# Patient Record
Sex: MALE | Race: ASIAN | NOT HISPANIC OR LATINO | URBAN - METROPOLITAN AREA
[De-identification: names, ages, dates, MRNs, and addresses within clinical notes are randomized per-mention and may not be internally consistent; named-entity substitution may affect disease eponyms.]

---

## 2017-01-06 ENCOUNTER — OFFICE VISIT (OUTPATIENT)
Dept: FAMILY MEDICINE | Facility: CLINIC | Age: 47
End: 2017-01-06
Payer: COMMERCIAL

## 2017-01-06 VITALS
OXYGEN SATURATION: 96 % | SYSTOLIC BLOOD PRESSURE: 114 MMHG | TEMPERATURE: 97 F | DIASTOLIC BLOOD PRESSURE: 63 MMHG | HEIGHT: 64 IN | BODY MASS INDEX: 23.05 KG/M2 | WEIGHT: 135 LBS | HEART RATE: 87 BPM

## 2017-01-06 DIAGNOSIS — R73.03 PREDIABETES: ICD-10-CM

## 2017-01-06 DIAGNOSIS — E78.5 HYPERLIPIDEMIA LDL GOAL <130: ICD-10-CM

## 2017-01-06 DIAGNOSIS — K21.9 GASTROESOPHAGEAL REFLUX DISEASE, ESOPHAGITIS PRESENCE NOT SPECIFIED: Primary | ICD-10-CM

## 2017-01-06 DIAGNOSIS — Z87.891 FORMER SMOKER: ICD-10-CM

## 2017-01-06 LAB
ERYTHROCYTE [DISTWIDTH] IN BLOOD BY AUTOMATED COUNT: 12.9 % (ref 10–15)
HCT VFR BLD AUTO: 43.3 % (ref 40–53)
HGB BLD-MCNC: 14.2 G/DL (ref 13.3–17.7)
MCH RBC QN AUTO: 30.4 PG (ref 26.5–33)
MCHC RBC AUTO-ENTMCNC: 32.8 G/DL (ref 31.5–36.5)
MCV RBC AUTO: 93 FL (ref 78–100)
PLATELET # BLD AUTO: 241 10E9/L (ref 150–450)
RBC # BLD AUTO: 4.67 10E12/L (ref 4.4–5.9)
WBC # BLD AUTO: 8.9 10E9/L (ref 4–11)

## 2017-01-06 PROCEDURE — 99214 OFFICE O/P EST MOD 30 MIN: CPT | Performed by: NURSE PRACTITIONER

## 2017-01-06 PROCEDURE — 85027 COMPLETE CBC AUTOMATED: CPT | Performed by: NURSE PRACTITIONER

## 2017-01-06 PROCEDURE — 36415 COLL VENOUS BLD VENIPUNCTURE: CPT | Performed by: NURSE PRACTITIONER

## 2017-01-06 RX ORDER — ATORVASTATIN CALCIUM 20 MG/1
20 TABLET, FILM COATED ORAL
Qty: 90 TABLET | Refills: 3 | Status: SHIPPED | OUTPATIENT
Start: 2017-01-06 | End: 2017-07-19

## 2017-01-06 NOTE — NURSING NOTE
"Chief Complaint   Patient presents with     Gastrophageal Reflux     Lab Result Notice       Initial /63 mmHg  Pulse 87  Temp(Src) 97  F (36.1  C) (Oral)  Ht 5' 4\" (1.626 m)  Wt 135 lb (61.236 kg)  BMI 23.16 kg/m2  SpO2 96% Estimated body mass index is 23.16 kg/(m^2) as calculated from the following:    Height as of this encounter: 5' 4\" (1.626 m).    Weight as of this encounter: 135 lb (61.236 kg).  BP completed using cuff size: echo Vasquez MA      "

## 2017-01-06 NOTE — PATIENT INSTRUCTIONS
Based on your medical history and these are the current health maintenance or preventive care services that you are due for (some may have been done at this visit)  There are no preventive care reminders to display for this patient.    At [facility], we strive to deliver an exceptional experience to you, every time we see you.    If you receive a survey in the mail, please send us back your thoughts. We really do value your feedback.    Your care team's suggested websites for health information:  Www.[place].org : Up to date and easily searchable information on multiple topics.  Www.medlineplus.gov : medication info, interactive tutorials, watch real surgeries online  Www.familydoctor.org : good info from the Academy of Family Physicians  Www.cdc.gov : public health info, travel advisories, epidemics (H1N1)  Www.aap.org : children's health info, normal development, vaccinations  Www.health.[facility].[place].us : [place] dept of health, public health issues in [place], N1N1    How to contact your care team:   [facility] [phone]         Pharmacy [phone]    Dr. Mccauley, Carly Guzmán PA-C, Dr. Chakraborty, Ramonita MUÑOZ CNP, Karla Lindsay PA-C, Dr. Singh, and Stephanie Juarez CNP    Team RNs: Kylah Mustafa      Clinic hours  M-Th 7 am-7 pm   Fri 7 am-5 pm.   Urgent care M-F 11 am-9 pm,   Sat/Sun 9 am-5 pm.  Pharmacy M-Th 8 am-8 pm Fri 8 am-6 pm  Sat/Sun 9 am-5 pm.     All password changes, disabled accounts, or ID changes in [email or web address] will be done by our Access Services Department.    If you need help with your account or password, call: [phone]. Clinic staff no longer has the ability to change passwords.       GERD (Adult)    The esophagus is a tube that carries food from the mouth to the stomach. A valve at the lower end of the esophagus prevents stomach acid from flowing upward. When this valve doesn't work properly, stomach contents may repeatedly flow back up  "(reflux) into the esophagus. This is called gastroesophageal reflux disease (GERD). GERD can irritate the esophagus. It can cause problems with swallowing or breathing. In severe cases, GERD can cause recurrent pneumonia or other serious problems.  Symptoms of reflux include burning, pressure or sharp pain in the upper abdomen or mid to lower chest. The pain can spread to the neck, back, or shoulder. There may be belching, an acid taste in the back of the throat, chronic cough, or sore throat or hoarseness. GERD symptoms often occur during the day after a big meal. They can also occur at night when lying down.   Home care  Lifestyle changes can help reduce symptoms. If needed, medicines may be prescribed. Symptoms often improve with treatment, but if treatment is stopped, the symptoms often return after a few months. So most persons with GERD will need to continue treatment.  Lifestyle changes    Limit or avoid fatty, fried, and spicy foods, as well as coffee, chocolate, mint, and foods with high acid content such as tomatoes and citrus fruit and juices (orange, grapefruit, lemon).    Don t eat large meals, especially at night. Frequent, smaller meals are best. Do not lie down right after eating. And don t eat anything 3 hours before going to bed.    Avoid drinking alcohol and smoking. As much as possible, stay away from second hand smoke.    If you are overweight, losing weight will reduce symptoms.     Avoid wearing tight clothing around your stomach area.    If your symptoms occur during sleep, use a foam wedge to elevate your upper body (not just your head.) Or, place 4\" blocks under the head of your bed.  Medicines  If needed, medicines can help relieve the symptoms of GERD and prevent damage to the esophagus. Discuss a medicine plan with your healthcare provider. This may include one or more of the following medicines:    Antacids to help neutralize the normal acids in your stomach.    Acid blockers (H2 " blockers) to decrease acid production.    Acid inhibitors (PPIs) to decrease acid production in a different way than the blockers. They may work better, but can take a little longer to take effect.  Take an antacid 30-60 minutes after eating and at bedtime, but not at the same time as an acid blocker.  Try not to take medicines such as ibuprofen and aspirin. If you are taking aspirin for your heart or other medical reasons, talk to your healthcare provider about stopping it.  Follow-up care  Follow up with your healthcare provider or as advised by our staff.  When to seek medical advice  Call your healthcare provider if any of the following occur:    Stomach pain gets worse or moves to the lower right abdomen (appendix area)    Chest pain appears or gets worse, or spreads to the back, neck, shoulder, or arm    Frequent vomiting (can t keep down liquids)    Blood in the stool or vomit (red or black in color)    Feeling weak or dizzy    Fever of 100.4 F (38 C) or higher, or as directed by your healthcare provider    9675-9324 [employer]. [address] [place] [address]. All rights reserved. This information is not intended as a substitute for professional medical care. Always follow your healthcare professional's instructions.        Healthy Meals for Diabetes  Ask your healthcare team to help you make a meal plan that fits your needs. Your meal plan tells you when to eat your meals and snacks, what kinds of foods to eat, and how much of each food to eat. You don t have to give up all the foods you like. But you do need to follow some guidelines.  Choose healthy carbohydrates    Starches, sugars, and fiber are all types of carbohydrates. Fiber can help lower your cholesterol and triglycerides. Fiber is also healthy for your heart. You should have 20 to 35 grams of total fiber each day. Fiber-rich foods include:    Whole-grain breads and cereals    Bulgur wheat    Brown rice       Whole-wheat  pasta    Fruits and vegetables    Dry beans, and peas   It s important to keep track of the amount of carbohydrates you eat. This can help you keep the right balance of physical activity and medicine. The amount of carbohydrates needed will vary for each person. It depends on many things such as your health, the medicines you take, and how active you are. Your healthcare team will help you figure out the right amount of carbohydrates for you. You may start with around 45 to 60 grams of carbohydrates per meal, depending on your situation.   Here are some examples of foods containing about 15 grams of carbohydrates (1 serving of carbohydrates):    1/2 cup of canned or frozen fruit    A small piece of fresh fruit (4 ounces)    1 slice of bread    1/2 cup of oatmeal    1/3 cup of rice    4 to 6 crackers    1/2 English muffin    1/2 cup of black beans    1/4 of a large baked potato (3 ounces)    2/3 cup of plain fat-free yogurt    1 cup of soup    1/2 cup of casserole    6 chicken nuggets    2-inch square brownie or cake without frosting    2 small cookies    1/2 cup of ice cream or sherbet  Choose healthy protein foods  Eating protein that is low in fat can help you control your weight. It also helps keep your heart healthy. Low-fat protein foods include:    Fish    Plant proteins, such as dry beans and peas, nuts, and soy products like tofu and soymilk    Lean meat with all visible fat removed    Poultry with the skin removed    Low-fat or nonfat milk, cheese, and yogurt  Limit unhealthy fats and sugar  Saturated and trans fats are unhealthy for your heart. They raise LDL (bad) cholesterol. Fat is also high in calories, so it can make you gain weight. To cut down on unhealthy fats and sugar, limit these foods:    Butter or margarine    Palm and palm kernel oils and coconut oil    Cream    Cheese    Duff    Lunch meats       Ice cream    Sweet bakery goods such as pies, muffins, and donuts    Jams and jellies    Candy  bars    Regular sodas   How much to eat  The amount of food you eat affects your blood sugar. It also affects your weight. Your health care team will tell you how much of each type of food you should eat.    Use measuring cups and spoons and a food scale to measure serving sizes.    Learn what a correct serving size looks like on your plate. This will help when you are away from home and can t measure your servings.    Eat only the number of servings given on your meal plan for each food. Don t take seconds.    Learn to read food labels. Be sure to look at serving size, total carbohydrates, fiber, calories, sugar, and saturated and trans fats.  When to eat  Your meal plan will likely include breakfast, lunch, dinner, and some snacks.    Try to eat your meals and snacks at about the same times each day.    Eat all your meals and snacks. Skipping a meal or snack can make your blood sugar drop too low. It can also cause you to eat too much at the next meal or snack. Then your blood sugar could get too high.    8278-0928 [employer]. [address] [place] [address], [place] [address]. All rights reserved. This information is not intended as a substitute for professional medical care. Always follow your healthcare professional's instructions.

## 2017-01-06 NOTE — MR AVS SNAPSHOT
After Visit Summary   1/6/2017    Martin Salvador    MRN: [id number]           Patient Information     Date Of Birth          1970        Visit Information        Provider Department      1/6/2017 4:00 PM Maria Fernanda Juarez APRN CNP [facility]        Today's Diagnoses     Gastroesophageal reflux disease, esophagitis presence not specified    -  1     Prediabetes         Former smoker         Hyperlipidemia LDL goal <130           Care Instructions    Based on your medical history and these are the current health maintenance or preventive care services that you are due for (some may have been done at this visit)  There are no preventive care reminders to display for this patient.    At [facility], we strive to deliver an exceptional experience to you, every time we see you.    If you receive a survey in the mail, please send us back your thoughts. We really do value your feedback.    Your care team's suggested websites for health information:  Www.[employer] : Up to date and easily searchable information on multiple topics.  Www.medlineplus.gov : medication info, interactive tutorials, watch real surgeries online  Www.familydoctor.org : good info from the Academy of Family Physicians  Www.cdc.gov : public health info, travel advisories, epidemics (H1N1)  Www.aap.org : children's health info, normal development, vaccinations  Www.health.[facility].[place].us : [place] dept of health, public health issues in [place], N1N1    How to contact your care team:   Team [facility] [phone]         Pharmacy [phone]    Dr. Mccauley, Carly Guzmán PA-C, Ramonita Vale CNP, Karla Lindsay PA-C, Dr. Singh, and Stephanie Juarez CNP    Team RNs: Kylah & Moon      Clinic hours  M-Th 7 am-7 pm   Fri 7 am-5 pm.   Urgent care M-F 11 am-9 pm,   Sat/Sun 9 am-5 pm.  Pharmacy M-Th 8 am-8 pm Fri 8 am-6 pm  Sat/Sun 9 am-5 pm.     All password changes, disabled  accounts, or ID changes in [facility]/MyHealth will be done by our Access Services Department.    If you need help with your account or password, call: [phone]. Clinic staff no longer has the ability to change passwords.       GERD (Adult)    The esophagus is a tube that carries food from the mouth to the stomach. A valve at the lower end of the esophagus prevents stomach acid from flowing upward. When this valve doesn't work properly, stomach contents may repeatedly flow back up (reflux) into the esophagus. This is called gastroesophageal reflux disease (GERD). GERD can irritate the esophagus. It can cause problems with swallowing or breathing. In severe cases, GERD can cause recurrent pneumonia or other serious problems.  Symptoms of reflux include burning, pressure or sharp pain in the upper abdomen or mid to lower chest. The pain can spread to the neck, back, or shoulder. There may be belching, an acid taste in the back of the throat, chronic cough, or sore throat or hoarseness. GERD symptoms often occur during the day after a big meal. They can also occur at night when lying down.   Home care  Lifestyle changes can help reduce symptoms. If needed, medicines may be prescribed. Symptoms often improve with treatment, but if treatment is stopped, the symptoms often return after a few months. So most persons with GERD will need to continue treatment.  Lifestyle changes    Limit or avoid fatty, fried, and spicy foods, as well as coffee, chocolate, mint, and foods with high acid content such as tomatoes and citrus fruit and juices (orange, grapefruit, lemon).    Don t eat large meals, especially at night. Frequent, smaller meals are best. Do not lie down right after eating. And don t eat anything 3 hours before going to bed.    Avoid drinking alcohol and smoking. As much as possible, stay away from second hand smoke.    If you are overweight, losing weight will reduce symptoms.     Avoid wearing tight clothing  "around your stomach area.    If your symptoms occur during sleep, use a foam wedge to elevate your upper body (not just your head.) Or, place 4\" blocks under the head of your bed.  Medicines  If needed, medicines can help relieve the symptoms of GERD and prevent damage to the esophagus. Discuss a medicine plan with your healthcare provider. This may include one or more of the following medicines:    Antacids to help neutralize the normal acids in your stomach.    Acid blockers (H2 blockers) to decrease acid production.    Acid inhibitors (PPIs) to decrease acid production in a different way than the blockers. They may work better, but can take a little longer to take effect.  Take an antacid 30-60 minutes after eating and at bedtime, but not at the same time as an acid blocker.  Try not to take medicines such as ibuprofen and aspirin. If you are taking aspirin for your heart or other medical reasons, talk to your healthcare provider about stopping it.  Follow-up care  Follow up with your healthcare provider or as advised by our staff.  When to seek medical advice  Call your healthcare provider if any of the following occur:    Stomach pain gets worse or moves to the lower right abdomen (appendix area)    Chest pain appears or gets worse, or spreads to the back, neck, shoulder, or arm    Frequent vomiting (can t keep down liquids)    Blood in the stool or vomit (red or black in color)    Feeling weak or dizzy    Fever of 100.4 F (38 C) or higher, or as directed by your healthcare provider    1586-7564 [employer]. [address] [place] [address], [place] [address]. All rights reserved. This information is not intended as a substitute for professional medical care. Always follow your healthcare professional's instructions.        Healthy Meals for Diabetes  Ask your healthcare team to help you make a meal plan that fits your needs. Your meal plan tells you when to eat your meals and snacks, what kinds of foods to " eat, and how much of each food to eat. You don t have to give up all the foods you like. But you do need to follow some guidelines.  Choose healthy carbohydrates    Starches, sugars, and fiber are all types of carbohydrates. Fiber can help lower your cholesterol and triglycerides. Fiber is also healthy for your heart. You should have 20 to 35 grams of total fiber each day. Fiber-rich foods include:    Whole-grain breads and cereals    Bulgur wheat    Brown rice       Whole-wheat pasta    Fruits and vegetables    Dry beans, and peas   It s important to keep track of the amount of carbohydrates you eat. This can help you keep the right balance of physical activity and medicine. The amount of carbohydrates needed will vary for each person. It depends on many things such as your health, the medicines you take, and how active you are. Your healthcare team will help you figure out the right amount of carbohydrates for you. You may start with around 45 to 60 grams of carbohydrates per meal, depending on your situation.   Here are some examples of foods containing about 15 grams of carbohydrates (1 serving of carbohydrates):    1/2 cup of canned or frozen fruit    A small piece of fresh fruit (4 ounces)    1 slice of bread    1/2 cup of oatmeal    1/3 cup of rice    4 to 6 crackers    1/2 English muffin    1/2 cup of black beans    1/4 of a large baked potato (3 ounces)    2/3 cup of plain fat-free yogurt    1 cup of soup    1/2 cup of casserole    6 chicken nuggets    2-inch square brownie or cake without frosting    2 small cookies    1/2 cup of ice cream or sherbet  Choose healthy protein foods  Eating protein that is low in fat can help you control your weight. It also helps keep your heart healthy. Low-fat protein foods include:    Fish    Plant proteins, such as dry beans and peas, nuts, and soy products like tofu and soymilk    Lean meat with all visible fat removed    Poultry with the skin removed    Low-fat  or nonfat milk, cheese, and yogurt  Limit unhealthy fats and sugar  Saturated and trans fats are unhealthy for your heart. They raise LDL (bad) cholesterol. Fat is also high in calories, so it can make you gain weight. To cut down on unhealthy fats and sugar, limit these foods:    Butter or margarine    Palm and palm kernel oils and coconut oil    Cream    Cheese    Duff    Lunch meats       Ice cream    Sweet bakery goods such as pies, muffins, and donuts    Jams and jellies    Candy bars    Regular sodas   How much to eat  The amount of food you eat affects your blood sugar. It also affects your weight. Your health care team will tell you how much of each type of food you should eat.    Use measuring cups and spoons and a food scale to measure serving sizes.    Learn what a correct serving size looks like on your plate. This will help when you are away from home and can t measure your servings.    Eat only the number of servings given on your meal plan for each food. Don t take seconds.    Learn to read food labels. Be sure to look at serving size, total carbohydrates, fiber, calories, sugar, and saturated and trans fats.  When to eat  Your meal plan will likely include breakfast, lunch, dinner, and some snacks.    Try to eat your meals and snacks at about the same times each day.    Eat all your meals and snacks. Skipping a meal or snack can make your blood sugar drop too low. It can also cause you to eat too much at the next meal or snack. Then your blood sugar could get too high.    2229-6679 [employer]. [address] [place] [address], [place] [address]. All rights reserved. This information is not intended as a substitute for professional medical care. Always follow your healthcare professional's instructions.              Follow-ups after your visit        Additional Services     NUTRITION REFERRAL       Your provider has referred you to: [facility]: [facility] ([phone])  "[phone]   [email or web address]    Please be aware that coverage of these services is subject to the terms and limitations of your health insurance plan.  Call member services at your health plan with any benefit or coverage questions.      Please bring the following with you to your appointment:    (1) This referral request  (2) Any documents given to you regarding this referral  (3) Any specific questions you have about diet and/or food choices                  Future tests that were ordered for you today     Open Future Orders        Priority Expected Expires Ordered    H Pylori antigen, stool Routine  2/5/2017 1/6/2017            Who to contact     If you have questions or need follow up information about today's clinic visit or your schedule please contact [facility] directly at [phone].  Normal or non-critical lab and imaging results will be communicated to you by MyChart, letter or phone within 4 business days after the clinic has received the results. If you do not hear from us within 7 days, please contact the clinic through MyChart or phone. If you have a critical or abnormal lab result, we will notify you by phone as soon as possible.  Submit refill requests through [employer] or call your pharmacy and they will forward the refill request to us. Please allow 3 business days for your refill to be completed.          Additional Information About Your Visit        [employer] Information     [employer] lets you send messages to your doctor, view your test results, renew your prescriptions, schedule appointments and more. To sign up, go to [email or web address] . Click on \"Log in\" on the left side of the screen, which will take you to the Welcome page. Then click on \"Sign up Now\" on the right side of the page.     You will be asked to enter the access code listed below, as well as some personal information. Please follow the directions to create your username and " "password.     Your access code is: [email or web address]  Expires: 2017  3:53 PM     Your access code will  in 90 days. If you need help or a new code, please call your [facility] or [phone].        Care EveryWhere ID     This is your Care EveryWhere ID. This could be used by other organizations to access your [facility] medical records  [id number]        Your Vitals Were     Pulse Temperature Height BMI (Body Mass Index) Pulse Oximetry       87 97  F (36.1  C) (Oral) 5' 4\" (1.626 m) 23.16 kg/m2 96%        Blood Pressure from Last 3 Encounters:   17 114/63   16 115/59   11/15/16 114/69    Weight from Last 3 Encounters:   17 135 lb (61.236 kg)   16 133 lb (60.328 kg)   11/15/16 135 lb (61.236 kg)              We Performed the Following     CBC with platelets     NUTRITION REFERRAL          Today's Medication Changes          These changes are accurate as of: 17  4:47 PM.  If you have any questions, ask your nurse or doctor.               Start taking these medicines.        Dose/Directions    omeprazole 20 MG CR capsule   Commonly known as:  priLOSEC   Used for:  Gastroesophageal reflux disease, esophagitis presence not specified   Started by:  Maria Fernanda Juarez APRN CNP        Dose:  20 mg   Take 1 capsule (20 mg) by mouth daily   Quantity:  90 capsule   Refills:  1            Where to get your medicines      These medications were sent to [facility] [address] [facility]  [address] [facility] [address]    Hours:  24-hours Phone:  [phone]    - atorvastatin 20 MG tablet  - omeprazole 20 MG CR capsule             Primary Care Provider Office Phone # Fax #    Purvi Chakraborty [phone]       [facility] [address] [facility] [address]        Thank you!     Thank you for choosing [facility]  for your care. Our goal is always to provide " you with excellent care. Hearing back from our patients is one way we can continue to improve our services. Please take a few minutes to complete the written survey that you may receive in the mail after your visit with us. Thank you!             Your Updated Medication List - Protect others around you: Learn how to safely use, store and throw away your medicines at www.disposemymeds.org.          This list is accurate as of: 1/6/17  4:47 PM.  Always use your most recent med list.                   Brand Name Dispense Instructions for use    atorvastatin 20 MG tablet    LIPITOR    90 tablet    Take 1 tablet (20 mg) by mouth daily (with dinner)       hydrocortisone 25 MG Suppository    ANUSOL-HC    28 suppository    Place 1 suppository (25 mg) rectally 2 times daily       metFORMIN 500 MG 24 hr tablet    GLUCOPHAGE-XR    90 tablet    Take 1 tablet (500 mg) by mouth daily (with dinner)       omeprazole 20 MG CR capsule    priLOSEC    90 capsule    Take 1 capsule (20 mg) by mouth daily       tamsulosin 0.4 MG capsule    FLOMAX    60 capsule    Take 2 capsules (0.8 mg) by mouth At Bedtime

## 2017-01-06 NOTE — PROGRESS NOTES
"  SUBJECTIVE:                                                    Martin Salvador is a 46 year old male who presents to clinic today for the following health issues:    Concern: Discuss results from 11/15/2016.    GERD/Heartburn     Onset: Couple of years      Description:     Burning in chest: no- sour taste in mouth in the mornings     Intensity: moderate    Progression of Symptoms: same    Accompanying Signs & Symptoms:  Does it feel like food gets stuck: no  Nausea: no  Vomiting (bloody?): no  Abdominal Pain: no  Black-Tarry stools: no:  Bloody stools: no   History:   Previous ulcers: no    Precipitating factors:   Caffeine use: no  Alcohol use: no  NSAID/Aspirin use: no  Tobacco use: YES- Two years ago   Worse with no particular food or drink.    Alleviating factors:  no         Therapies Tried and outcome:none  Patient was seen by his Dentist and told his tooth enamel was weak due to GERD.  He denies abdominal pain, nausea, vomiting, dyspepsia, early satiety, weight loss, constipation, diarrhea, hematochezia.      Problem list and histories reviewed & adjusted, as indicated.  Additional history: as documented    BP Readings from Last 3 Encounters:   01/06/17 114/63   11/22/16 115/59   11/15/16 114/69    Wt Readings from Last 3 Encounters:   01/06/17 135 lb (61.236 kg)   11/22/16 133 lb (60.328 kg)   11/15/16 135 lb (61.236 kg)                  Labs reviewed in EPIC  Problem list, Medication list, Allergies, and Medical/Social/Surgical histories reviewed in [facility] and updated as appropriate.    ROS:  Constitutional, HEENT, cardiovascular, pulmonary, gi and gu systems are negative, except as otherwise noted.    OBJECTIVE:                                                    /63 mmHg  Pulse 87  Temp(Src) 97  F (36.1  C) (Oral)  Ht 5' 4\" (1.626 m)  Wt 135 lb (61.236 kg)  BMI 23.16 kg/m2  SpO2 96%  Body mass index is 23.16 kg/(m^2).  GENERAL: healthy, alert and no distress  EYES: Eyes grossly normal to " "inspection, PERRL and conjunctivae and sclerae normal  HENT: ear canals and TM's normal, nose and mouth without ulcers or lesions  NECK: no adenopathy, no asymmetry, masses, or scars and thyroid normal to palpation  RESP: lungs clear to auscultation - no rales, rhonchi or wheezes  CV: regular rate and rhythm, normal S1 S2, no S3 or S4, no murmur, click or rub, no peripheral edema and peripheral pulses strong  ABDOMEN: soft, nontender, no hepatosplenomegaly, no masses and bowel sounds normal  MS: no gross musculoskeletal defects noted, no edema  SKIN: no suspicious lesions or rashes  BACK: no CVA tenderness, no paralumbar tenderness  PSYCH: mentation appears normal, affect normal/bright    Diagnostic Test Results:  No results found for this or any previous visit (from the past 24 hour(s)).     ASSESSMENT/PLAN:                                                          BMI:   Estimated body mass index is 23.16 kg/(m^2) as calculated from the following:    Height as of this encounter: 5' 4\" (1.626 m).    Weight as of this encounter: 135 lb (61.236 kg).         1. Gastroesophageal reflux disease, esophagitis presence not specified  Checking H pylori and will treat if positive.  Reviewed trigger avoidance.  - H Pylori antigen, stool; Future  - CBC with platelets  - omeprazole (PRILOSEC) 20 MG CR capsule; Take 1 capsule (20 mg) by mouth daily  Dispense: 90 capsule; Refill: 1    2. Prediabetes  Encouraged cutting back on carbohydrates in diet.  Discussed heart healthy diet.  - NUTRITION REFERRAL    3. Former smoker  Quit smoking 1/1/17.  Doing well thus far but it is still early.  He is not interested in support at this time for smoking cessation.    4. Hyperlipidemia LDL goal <130  Low chol diet reviewed, maintain normal body weight, get regular exercise- goal is 150 min moderately strenuous exercise/week.  - atorvastatin (LIPITOR) 20 MG tablet; Take 1 tablet (20 mg) by mouth daily (with dinner)  Dispense: 90 tablet; " Refill: 3    See Patient Instructions    TONI Pineda CNP  [facility]

## 2017-01-08 DIAGNOSIS — K21.9 GASTROESOPHAGEAL REFLUX DISEASE, ESOPHAGITIS PRESENCE NOT SPECIFIED: ICD-10-CM

## 2017-01-08 PROCEDURE — 87338 HPYLORI STOOL AG IA: CPT | Mod: 90 | Performed by: NURSE PRACTITIONER

## 2017-01-08 PROCEDURE — 99000 SPECIMEN HANDLING OFFICE-LAB: CPT | Performed by: NURSE PRACTITIONER

## 2017-01-09 LAB
H PYLORI AG STL QL IA: NORMAL
MICRO REPORT STATUS: NORMAL
SPECIMEN SOURCE: NORMAL

## 2017-07-07 ENCOUNTER — DOCUMENTATION ONLY (OUTPATIENT)
Dept: LAB | Facility: CLINIC | Age: 47
End: 2017-07-07

## 2017-07-07 DIAGNOSIS — R73.03 PREDIABETES: Primary | ICD-10-CM

## 2017-07-07 DIAGNOSIS — R73.03 PREDIABETES: ICD-10-CM

## 2017-07-07 NOTE — PROGRESS NOTES
This patient has a lab only appointment on 7/7/2017 but does not have future orders. Please review, associate diagnosis and sign pending lab orders for the upcoming appointment.  There are no future scheduled appointments with you at this time.      Thank you,    [facility]

## 2017-07-07 NOTE — LETTER
[address] [address]  [phone]    July 11, 2017    Martin Salvador  [address]  [place] [place] [address]    Dear Martin Salvador,     Cholesterol is still elevated. LDL should be less than 130, and yours is at 164. Please follow up in clinic to discuss medication changes. Please let me know if you have any questions and thank you for choosing [facility].     Regards,     Purvi Chakraborty MD MPH/smj    Results for orders placed or performed in visit on 07/07/17   **Lipid panel reflex to direct LDL FUTURE anytime   Result Value Ref Range    Cholesterol 251 (H) <200 mg/dL    Triglycerides 222 (H) <150 mg/dL    HDL Cholesterol 43 >39 mg/dL    LDL Cholesterol Calculated 164 (H) <100 mg/dL    Non HDL Cholesterol 208 (H) <130 mg/dL

## 2017-07-10 LAB
CHOLEST SERPL-MCNC: 251 MG/DL
HDLC SERPL-MCNC: 43 MG/DL
LDLC SERPL CALC-MCNC: 164 MG/DL
NONHDLC SERPL-MCNC: 208 MG/DL
TRIGL SERPL-MCNC: 222 MG/DL

## 2017-07-10 PROCEDURE — 36415 COLL VENOUS BLD VENIPUNCTURE: CPT | Performed by: PREVENTIVE MEDICINE

## 2017-07-10 PROCEDURE — 80061 LIPID PANEL: CPT | Performed by: PREVENTIVE MEDICINE

## 2017-07-11 NOTE — PROGRESS NOTES
Please send a letter:    Dear Martin Salvador,    Cholesterol is still elevated. LDL should be less than 130, and yours is at 164. Please follow up in clinic to discuss medication changes. Please let me know if you have any questions and thank you for choosing [facility].    Regards,    Purvi Chakraborty MD MPH

## 2017-07-14 ENCOUNTER — TELEPHONE (OUTPATIENT)
Dept: FAMILY MEDICINE | Facility: CLINIC | Age: 47
End: 2017-07-14

## 2017-07-14 NOTE — TELEPHONE ENCOUNTER
Called the patient and went over the lipid result. He was wondering about A1C testing. Informed not done. Advise that provider note states to be seen to discuss results. Patient scheduled.    Moon Goodson RN, [facility]

## 2017-07-19 ENCOUNTER — OFFICE VISIT (OUTPATIENT)
Dept: FAMILY MEDICINE | Facility: CLINIC | Age: 47
End: 2017-07-19
Payer: COMMERCIAL

## 2017-07-19 VITALS
TEMPERATURE: 97.5 F | SYSTOLIC BLOOD PRESSURE: 115 MMHG | HEART RATE: 78 BPM | HEIGHT: 64 IN | WEIGHT: 135 LBS | OXYGEN SATURATION: 99 % | BODY MASS INDEX: 23.05 KG/M2 | DIASTOLIC BLOOD PRESSURE: 71 MMHG

## 2017-07-19 DIAGNOSIS — K64.9 HEMORRHOIDS, UNSPECIFIED HEMORRHOID TYPE: ICD-10-CM

## 2017-07-19 DIAGNOSIS — K12.0 ORAL APHTHOUS ULCER: ICD-10-CM

## 2017-07-19 DIAGNOSIS — J43.8 PARASEPTAL EMPHYSEMA (H): ICD-10-CM

## 2017-07-19 DIAGNOSIS — K21.9 GASTROESOPHAGEAL REFLUX DISEASE, ESOPHAGITIS PRESENCE NOT SPECIFIED: ICD-10-CM

## 2017-07-19 DIAGNOSIS — E78.5 HYPERLIPIDEMIA LDL GOAL <130: ICD-10-CM

## 2017-07-19 DIAGNOSIS — R73.03 PREDIABETES: Primary | ICD-10-CM

## 2017-07-19 DIAGNOSIS — N40.1 BENIGN NODULAR PROSTATIC HYPERPLASIA WITH LOWER URINARY TRACT SYMPTOMS: ICD-10-CM

## 2017-07-19 DIAGNOSIS — R91.8 PULMONARY NODULES: ICD-10-CM

## 2017-07-19 LAB — HBA1C MFR BLD: 5.7 % (ref 4.3–6)

## 2017-07-19 PROCEDURE — 36415 COLL VENOUS BLD VENIPUNCTURE: CPT | Performed by: PREVENTIVE MEDICINE

## 2017-07-19 PROCEDURE — 99214 OFFICE O/P EST MOD 30 MIN: CPT | Performed by: PREVENTIVE MEDICINE

## 2017-07-19 PROCEDURE — 83036 HEMOGLOBIN GLYCOSYLATED A1C: CPT | Performed by: PREVENTIVE MEDICINE

## 2017-07-19 RX ORDER — FINASTERIDE 5 MG/1
TABLET, FILM COATED ORAL
Qty: 30 TABLET | Status: CANCELLED | OUTPATIENT
Start: 2017-07-19

## 2017-07-19 RX ORDER — TAMSULOSIN HYDROCHLORIDE 0.4 MG/1
0.8 CAPSULE ORAL AT BEDTIME
Qty: 180 CAPSULE | Refills: 1 | Status: SHIPPED | OUTPATIENT
Start: 2017-07-19 | End: 2018-03-07

## 2017-07-19 RX ORDER — FINASTERIDE 5 MG/1
TABLET, FILM COATED ORAL
COMMUNITY
Start: 2017-07-09 | End: 2017-07-19

## 2017-07-19 RX ORDER — TRIAMCINOLONE ACETONIDE 0.1 %
PASTE (GRAM) DENTAL 2 TIMES DAILY
Qty: 5 G | Refills: 1 | Status: SHIPPED | OUTPATIENT
Start: 2017-07-19 | End: 2020-11-27

## 2017-07-19 RX ORDER — ATORVASTATIN CALCIUM 20 MG/1
20 TABLET, FILM COATED ORAL
Qty: 90 TABLET | Refills: 3 | Status: SHIPPED | OUTPATIENT
Start: 2017-07-19 | End: 2018-03-16

## 2017-07-19 RX ORDER — METFORMIN HCL 500 MG
TABLET, EXTENDED RELEASE 24 HR ORAL
Qty: 90 TABLET | Refills: 0 | Status: SHIPPED | OUTPATIENT
Start: 2017-07-19 | End: 2018-03-16

## 2017-07-19 RX ORDER — HYDROCORTISONE ACETATE 25 MG/1
25 SUPPOSITORY RECTAL 2 TIMES DAILY
Qty: 28 SUPPOSITORY | Refills: 0 | Status: SHIPPED | OUTPATIENT
Start: 2017-07-19 | End: 2019-12-31

## 2017-07-19 ASSESSMENT — PAIN SCALES - GENERAL: PAINLEVEL: NO PAIN (0)

## 2017-07-19 NOTE — PROGRESS NOTES
SUBJECTIVE:                                                    Martin Salvador is a 47 year old male who presents to clinic today for the following health issues:      Medication Followup of Metformin    Taking Medication as prescribed: yes    Side Effects:  None    Medication Helping Symptoms:  yes     Hyperlipidemia Follow-Up      Rate your low fat/cholesterol diet?: good    Taking statin?  No    Other lipid medications/supplements?:  None    Has been out of medication for a few months     Mouth Pain:  -Pain on the right side, upper gum line  -Has been seen by dentist  -Was given Augmentin in the past for this, requesting refill, he had been using 1-2 pills as needed for pain instead of the 7 day course as prescribed.  -No fever or chills  -No drainage  -Has quit tobacco since 2 years     Problem list and histories reviewed & adjusted, as indicated.  Additional history: as documented    Patient Active Problem List   Diagnosis     External hemorrhoids     Internal hemorrhoids with other complication     Paraseptal emphysema (H)     Pulmonary nodules     Bladder calculi     Prediabetes     BPH (benign prostatic hyperplasia)     Former smoker     Gastroesophageal reflux disease, esophagitis presence not specified     Past Surgical History:   Procedure Laterality Date     LIGATION OF HEMORRHOID(S)         Social History   Substance Use Topics     Smoking status: Former Smoker     Packs/day: 1.00     Types: Cigarettes     Start date: 1/1/1984     Quit date: 1/16/2016     Smokeless tobacco: Never Used     Alcohol use Yes     Family History   Problem Relation Age of Onset     DIABETES Mother          Current Outpatient Prescriptions   Medication Sig Dispense Refill     metFORMIN (GLUCOPHAGE-XR) 500 MG 24 hr tablet TAKE 1 TABLET(500 MG) BY MOUTH DAILY WITH DINNER 90 tablet 0     omeprazole (PRILOSEC) 20 MG CR capsule Take 1 capsule (20 mg) by mouth daily 90 capsule 1     hydrocortisone (ANUSOL-HC) 25 MG Suppository Place 1  "suppository (25 mg) rectally 2 times daily 28 suppository 0     atorvastatin (LIPITOR) 20 MG tablet Take 1 tablet (20 mg) by mouth daily (with dinner) 90 tablet 3     tamsulosin (FLOMAX) 0.4 MG capsule Take 2 capsules (0.8 mg) by mouth At Bedtime 180 capsule 1     triamcinolone (KENALOG) 0.1 % paste Take by mouth 2 times daily 5 g 1     [DISCONTINUED] metFORMIN (GLUCOPHAGE-XR) 500 MG 24 hr tablet TAKE 1 TABLET(500 MG) BY MOUTH DAILY WITH DINNER 90 tablet 0     [DISCONTINUED] atorvastatin (LIPITOR) 20 MG tablet Take 1 tablet (20 mg) by mouth daily (with dinner) (Patient not taking: Reported on 7/19/2017) 90 tablet 3     No Known Allergies  BP Readings from Last 3 Encounters:   07/19/17 115/71   01/06/17 114/63   11/22/16 115/59    Wt Readings from Last 3 Encounters:   07/19/17 135 lb (61.2 kg)   01/06/17 135 lb (61.2 kg)   11/22/16 133 lb (60.3 kg)                        Reviewed and updated as needed this visit by clinical staffTobacco  Allergies  Meds       Reviewed and updated as needed this visit by Provider         ROS:  Constitutional, neuro, ENT, endocrine, pulmonary, cardiac, gastrointestinal, genitourinary, musculoskeletal, integument and psychiatric systems are negative, except as otherwise noted.      OBJECTIVE:                                                    /71  Pulse 78  Temp 97.5  F (36.4  C) (Oral)  Ht 5' 4\" (1.626 m)  Wt 135 lb (61.2 kg)  SpO2 99%  BMI 23.17 kg/m2  Body mass index is 23.17 kg/(m^2).  EYES: Eyes grossly normal to inspection and conjunctivae and sclerae normal  HENT: ear canals and TM's normal and small aphthous ulcer noted on upper gum line right side  NECK: no adenopathy and no asymmetry, masses, or scars  RESP: lungs clear to auscultation - no rales, rhonchi or wheezes  CV: regular rates and rhythm, normal S1 S2, no S3 or S4 and no murmur, click or rub  ABDOMEN: soft, non-tender  MS: extremities normal- no gross deformities noted  SKIN: no suspicious lesions or " rashes  NEURO: Normal strength and tone, mentation intact and speech normal  PSYCH: mentation appears normal and affect normal/bright    Diagnostic test results:  Diagnostic Test Results:  No results found for this or any previous visit (from the past 24 hour(s)).     CT chest 1/2016:    IMPRESSION:   1. 4 mm nodule within the left lower lobe is stable since the prior  exam and is statistically benign.  2. Other pulmonary nodules are identified which are above the field of  view of the prior CT abdomen/pelvis, the larger of which measures 3  mm. Recommend followup CT in 12 months.   3. Paraseptal emphysema.      [Consider Follow Up: Lung nodule]     I have personally reviewed the examination and initial interpretation  and I agree with the findings.     DIMAS ARELLANO MD     ASSESSMENT/PLAN:                                                    1. Prediabetes  -last HbA1C was 5.9  - metFORMIN (GLUCOPHAGE-XR) 500 MG 24 hr tablet; TAKE 1 TABLET(500 MG) BY MOUTH DAILY WITH DINNER  Dispense: 90 tablet; Refill: 0  - Hemoglobin A1c    2. Paraseptal emphysema (H)  -Noted on Ct chest  -No symptoms at this time  -has quit smoking since 2 years     3. Gastroesophageal reflux disease, esophagitis presence not specified  -refills on medication provided   - omeprazole (PRILOSEC) 20 MG CR capsule; Take 1 capsule (20 mg) by mouth daily  Dispense: 90 capsule; Refill: 1    4. Hemorrhoids, unspecified hemorrhoid type  - hydrocortisone (ANUSOL-HC) 25 MG Suppository; Place 1 suppository (25 mg) rectally 2 times daily  Dispense: 28 suppository; Refill: 0    5. Hyperlipidemia LDL goal <130  -Has been out of medication   -Last LDL was 164  - atorvastatin (LIPITOR) 20 MG tablet; Take 1 tablet (20 mg) by mouth daily (with dinner)  Dispense: 90 tablet; Refill: 3    6. Pulmonary nodules  -Needs repeat Ct scan, last in 1/2016  - CT Chest w/o Contrast; Future    7. Benign nodular prostatic hyperplasia with lower urinary tract symptoms  -  tamsulosin (FLOMAX) 0.4 MG capsule; Take 2 capsules (0.8 mg) by mouth At Bedtime  Dispense: 180 capsule; Refill: 1    8. Oral aphthous ulcer  - triamcinolone (KENALOG) 0.1 % paste; Take by mouth 2 times daily  Dispense: 5 g; Refill: 1    I ended our visit today by discussing the patient's diagnoses and recommended treatment. Please refer to today's diagnoses and orders for further details. I briefly discussed the pathophysiology of these conditions and outlined their expected course. I discussed the warning symptoms and signs that indicate an atypical course that would need urgent or emergent care. I also discussed self care strategies for symptom relief.  Patient voiced complete understanding of plan of care and was in full agreement to proceed. After visit summary discussed and handed to patient.    Common side effects of medications prescribed at this visit were discussed with the patient. Severe side effects, including current applicable black box warnings, were discussed.       Follow up with Provider - 6 months, sooner if any changes or concerns      Purvi Chakraborty MD MPH    [facility]

## 2017-07-19 NOTE — PATIENT INSTRUCTIONS
Based on your medical history and these are the current health maintenance or preventive care services that you are due for (some may have been done at this visit)  There are no preventive care reminders to display for this patient.      At [facility], we strive to deliver an exceptional experience to you, every time we see you.    If you receive a survey in the mail, please send us back your thoughts. We really do value your feedback.    Your care team's suggested websites for health information:  Www.[email or web address] : Up to date and easily searchable information on multiple topics.  Www.medlineplus.gov : medication info, interactive tutorials, watch real surgeries online  Www.familydoctor.org : good info from the Academy of Family Physicians  Www.cdc.gov : public health info, travel advisories, epidemics (H1N1)  Www.aap.org : children's health info, normal development, vaccinations  Www.health.[facility].us : [place] dept of health, public health issues in [place], N1N1    How to contact your care team:   [facility] [phone]         Pharmacy [phone]    Dr. Mccauley, Carly Guzmán PA-C, Dr. Chakraborty, Ramonita MUÑOZ CNP, Karla Lindsay PA-C, Dr. Singh, and TONI Hammer CNP    Team RNs: Kylah Mustafa      Clinic hours  M-Th 7 am-7 pm   Fri 7 am-5 pm.   Urgent care M-F 11 am-9 pm,   Sat/Sun 9 am-5 pm.  Pharmacy M-Th 8 am-8 pm Fri 8 am-6 pm  Sat/Sun 9 am-5 pm.     All password changes, disabled accounts, or ID changes in [employer]/MyHealth will be done by our Access Services Department.    If you need help with your account or password, call: [phone]. Clinic staff no longer has the ability to change passwords.

## 2017-07-19 NOTE — MR AVS SNAPSHOT
After Visit Summary   7/19/2017    Martin Salvador    MRN: [id number]           Patient Information     Date Of Birth          1970        Visit Information        Provider Department      7/19/2017 4:20 PM Purvi Chakraborty MD [facility]        Today's Diagnoses     Prediabetes    -  1    Paraseptal emphysema (H)        Gastroesophageal reflux disease, esophagitis presence not specified        Hemorrhoids, unspecified hemorrhoid type        Hyperlipidemia LDL goal <130        Pulmonary nodules        Benign nodular prostatic hyperplasia with lower urinary tract symptoms        Oral aphthous ulcer          Care Instructions    Based on your medical history and these are the current health maintenance or preventive care services that you are due for (some may have been done at this visit)  There are no preventive care reminders to display for this patient.      At [facility], we strive to deliver an exceptional experience to you, every time we see you.    If you receive a survey in the mail, please send us back your thoughts. We really do value your feedback.    Your care team's suggested websites for health information:  Www.[place].org : Up to date and easily searchable information on multiple topics.  Www.medlineplus.gov : medication info, interactive tutorials, watch real surgeries online  Www.familydoctor.org : good info from the Academy of Family Physicians  Www.cdc.gov : public health info, travel advisories, epidemics (H1N1)  Www.aap.org : children's health info, normal development, vaccinations  Www.health.state.[place].us : [place] dept of health, public health issues in [place], N1N1    How to contact your care team:   [facility] [phone]         Pharmacy [phone]    Dr. Mccauley, Carly Guzmán PA-C, Dr. Chakraborty, Ramonita Isaac APRN CNP, Karla Lindsay PA-C, Dr. Singh, and TONI Hammer CNP    Team RNs: Keya Simons  "hours  M-Th 7 am-7 pm   Fri 7 am-5 pm.   Urgent care M-F 11 am-9 pm,   Sat/Sun 9 am-5 pm.  Pharmacy M-Th 8 am-8 pm Fri 8 am-6 pm  Sat/Sun 9 am-5 pm.     All password changes, disabled accounts, or ID changes in [employer]/MyHealth will be done by our Access Services Department.    If you need help with your account or password, call: [phone]. Clinic staff no longer has the ability to change passwords.               Follow-ups after your visit        Follow-up notes from your care team     Return in about 6 months (around 1/19/2018) for Routine Visit.      Future tests that were ordered for you today     Open Future Orders        Priority Expected Expires Ordered    CT Chest w/o Contrast Routine  7/19/2018 7/19/2017            Who to contact     If you have questions or need follow up information about today's clinic visit or your schedule please contact [facility] directly at [phone].  Normal or non-critical lab and imaging results will be communicated to you by [employer], letter or phone within 4 business days after the clinic has received the results. If you do not hear from us within 7 days, please contact the clinic through [employer] or phone. If you have a critical or abnormal lab result, we will notify you by phone as soon as possible.  Submit refill requests through [employer] or call your pharmacy and they will forward the refill request to us. Please allow 3 business days for your refill to be completed.          Additional Information About Your Visit        [employer] Information     [employer] lets you send messages to your doctor, view your test results, renew your prescriptions, schedule appointments and more. To sign up, go to [email or web address] . Click on \"Log in\" on the left side of the screen, which will take you to the Welcome page. Then click on \"Sign up Now\" on the right side of the page.     You will be asked to enter the access code listed below, as well as some personal " "information. Please follow the directions to create your username and password.     Your access code is: [email or web address]  Expires: 10/17/2017  8:09 PM     Your access code will  in 90 days. If you need help or a new code, please call your [facility] clinic or [phone].        Care EveryWhere ID     This is your Care EveryWhere ID. This could be used by other organizations to access your [facility] medical records  [id number]        Your Vitals Were     Pulse Temperature Height Pulse Oximetry BMI (Body Mass Index)       78 97.5  F (36.4  C) (Oral) 5' 4\" (1.626 m) 99% 23.17 kg/m2        Blood Pressure from Last 3 Encounters:   17 115/71   17 114/63   16 115/59    Weight from Last 3 Encounters:   17 135 lb (61.2 kg)   17 135 lb (61.2 kg)   16 133 lb (60.3 kg)              We Performed the Following     Hemoglobin A1c          Today's Medication Changes          These changes are accurate as of: 17  8:09 PM.  If you have any questions, ask your nurse or doctor.               Start taking these medicines.        Dose/Directions    triamcinolone 0.1 % paste   Commonly known as:  KENALOG   Used for:  Oral aphthous ulcer   Started by:  Purvi Chakraborty MD        Take by mouth 2 times daily   Quantity:  5 g   Refills:  1         These medicines have changed or have updated prescriptions.        Dose/Directions    metFORMIN 500 MG 24 hr tablet   Commonly known as:  GLUCOPHAGE-XR   This may have changed:  See the new instructions.   Used for:  Prediabetes   Changed by:  Purvi Chakraborty MD        TAKE 1 TABLET(500 MG) BY MOUTH DAILY WITH DINNER   Quantity:  90 tablet   Refills:  0            Where to get your medicines      These medications were sent to [id number] - [facility] [address] [place] [address]  [address] [facility] [address]    Hours:  24-hours Phone:  [phone]     atorvastatin 20 MG tablet    hydrocortisone " 25 MG Suppository    metFORMIN 500 MG 24 hr tablet    omeprazole 20 MG CR capsule    tamsulosin 0.4 MG capsule    triamcinolone 0.1 % paste                Primary Care Provider Office Phone # Fax #    Purvi Chakraborty [phone]       [facility] [address]  [facility] [address]        Equal Access to Services     [facility] : Hadii aad ku hadasho Soomaali, waaxda luqadaha, qaybta kaalmada adeegyada, waxay idiin hayaan adeeg kharash la'aan ah. So [facility] [phone].    ATENCIÓN: Si habla español, tiene a saenz disposición servicios gratuitos de asistencia lingüística. Llame al [phone].    We comply with applicable federal civil rights laws and [place] laws. We do not discriminate on the basis of race, color, national origin, age, disability sex, sexual orientation or gender identity.            Thank you!     Thank you for choosing [facility]  for your care. Our goal is always to provide you with excellent care. Hearing back from our patients is one way we can continue to improve our services. Please take a few minutes to complete the written survey that you may receive in the mail after your visit with us. Thank you!             Your Updated Medication List - Protect others around you: Learn how to safely use, store and throw away your medicines at www.disposemymeds.org.          This list is accurate as of: 7/19/17  8:09 PM.  Always use your most recent med list.                   Brand Name Dispense Instructions for use Diagnosis    atorvastatin 20 MG tablet    LIPITOR    90 tablet    Take 1 tablet (20 mg) by mouth daily (with dinner)    Hyperlipidemia LDL goal <130       hydrocortisone 25 MG Suppository    ANUSOL-HC    28 suppository    Place 1 suppository (25 mg) rectally 2 times daily    Hemorrhoids, unspecified hemorrhoid type       metFORMIN 500 MG 24 hr tablet    GLUCOPHAGE-XR    90 tablet    TAKE 1 TABLET(500 MG) BY MOUTH DAILY WITH DINNER     Prediabetes       omeprazole 20 MG CR capsule    priLOSEC    90 capsule    Take 1 capsule (20 mg) by mouth daily    Gastroesophageal reflux disease, esophagitis presence not specified       tamsulosin 0.4 MG capsule    FLOMAX    180 capsule    Take 2 capsules (0.8 mg) by mouth At Bedtime    Benign nodular prostatic hyperplasia with lower urinary tract symptoms       triamcinolone 0.1 % paste    KENALOG    5 g    Take by mouth 2 times daily    Oral aphthous ulcer

## 2017-07-19 NOTE — LETTER
[address] [address]  [phone]                                                                                                           Martin Salvador  [address]  [facility] [place] [address]    July 20, 2017    Dear Martin Salvador,     Three month glucose value is normal and improved from last check. Plan of care and follow up as discussed in clinic. Please let me know if you have any questions and thank you for choosing [place].     Regards,     Purvi Chakraborty MD MPH/smj    Results for orders placed or performed in visit on 07/19/17   Hemoglobin A1c   Result Value Ref Range    Hemoglobin A1C 5.7 4.3 - 6.0 %

## 2017-07-19 NOTE — NURSING NOTE
"Chief Complaint   Patient presents with     Diabetes     check for DM       Initial /71  Pulse 78  Temp 97.5  F (36.4  C) (Oral)  Ht 5' 4\" (1.626 m)  Wt 135 lb (61.2 kg)  SpO2 99%  BMI 23.17 kg/m2 Estimated body mass index is 23.17 kg/(m^2) as calculated from the following:    Height as of this encounter: 5' 4\" (1.626 m).    Weight as of this encounter: 135 lb (61.2 kg).  Medication Reconciliation: complete   Mikey CAMARA        "

## 2017-07-20 NOTE — PROGRESS NOTES
Please send a letter:    Dear Martin Salvador,    Three month glucose value is normal and improved from last check. Plan of care and follow up as discussed in clinic. Please let me know if you have any questions and thank you for choosing [facility].    Regards,    Purvi Chakraborty MD MPH

## 2017-07-26 ENCOUNTER — RADIANT APPOINTMENT (OUTPATIENT)
Dept: CT IMAGING | Facility: CLINIC | Age: 47
End: 2017-07-26
Payer: COMMERCIAL

## 2017-07-26 DIAGNOSIS — R91.8 PULMONARY NODULES: ICD-10-CM

## 2017-07-26 PROCEDURE — 71250 CT THORAX DX C-: CPT | Performed by: RADIOLOGY

## 2017-07-26 NOTE — LETTER
[address] [address]  [phone]                                                                                                           Martin Salvador  [address]  [facility] [place] [address]    July 27, 2017    Dear Martin Salvador,     CT scan of the Chest did not show any changes in the lung nodules as compared to the last check. No new or enlarging lung nodules. Please let me know if you have any questions and thank you for choosing [facility].     Regards,     Purvi Chakraborty MD MPH/smj    Results for orders placed or performed in visit on 07/26/17   CT Chest w/o Contrast    Narrative    CT CHEST W/O CONTRAST 7/26/2017 3:23 PM    CLINICAL HISTORY: Pulmonary nodules    COMPARISON: 1/19/2016, 8/21/2014    PROCEDURE COMMENTS: CT of the chest was performed without intravenous  contrast. Axial MIP, coronal and sagittal reformatted images were  obtained.    FINDINGS:     Chest:  The thyroid parenchyma, aortic branching pattern, heart size,  pericardium, and esophagus are unremarkable. The ascending aorta and  main pulmonary artery are not enlarged. No lymphadenopathy in the  chest.    The central tracheobronchial tree is patent. No pneumothorax, pleural  effusion, or focal airspace consolidation. Minimal paraseptal  emphysema. The previously visualized 4 mm nodule in the left lower  lobe is no longer visualized. Single 3 mm nodule in the lateral right  upper lobe (series 4, image 46). Stable 2 mm nodule in the posterior  left upper lobe (series 4, image 41). No new or enlarging pulmonary  nodules.    Upper abdomen:  The visualized upper abdomen is unremarkable.    Bones and soft tissues:  No acute or worrisome osseous lesions.        Impression    IMPRESSION:    Stable sub-6 mm pulmonary nodules, unchanged since 1/19/2016.     I have personally reviewed the examination and initial interpretation  and I agree with the findings.    DARY CORDERO MD

## 2017-07-27 NOTE — PROGRESS NOTES
Please send a letter:    Dear Martin Salvador,    CT scan of the Chest did not show any changes in the lung nodules as compared to the last check. No new or enlarging lung nodules. Please let me know if you have any questions and thank you for choosing [facility].    Regards,    Purvi Chakraborty MD MPH

## 2017-08-09 ENCOUNTER — TELEPHONE (OUTPATIENT)
Dept: FAMILY MEDICINE | Facility: CLINIC | Age: 47
End: 2017-08-09

## 2017-08-09 NOTE — TELEPHONE ENCOUNTER
Notified pt of normal lab results.     Pt's wondering if his symptoms will go away on his own?  Routing to provider to advise.  Arabella Vasquez MA

## 2017-08-09 NOTE — TELEPHONE ENCOUNTER
MA to call patient with normal results.  Moon Goodson RN, [facility] Triage      Notes Recorded by Barbie Downs MA on 7/27/2017 at 9:25 AM  Letter sent.  Mikey CAMARA    ------    Notes Recorded by Purvi Chakraborty MD on 7/27/2017 at 7:14 AM  Please send a letter:    Dear Martin Salvador,    CT scan of the Chest did not show any changes in the lung nodules as compared to the last check. No new or enlarging lung nodules. Please let me know if you have any questions and thank you for choosing [facility].    Regards,    Purvi Chakraborty MD MPH

## 2017-08-09 NOTE — TELEPHONE ENCOUNTER
.Reason for Call:  Request for results:    Name of test or procedure: CT scan    Date of test of procedure: 07-26-17    Location of the test or procedure: [facility]    OK to leave the result message on voice mail or with a family member? YES w/ pt    Phone number Patient can be reached at:  Home number on file [phone] (home)    Additional comments: looking for results/whats going on    Call taken on 8/9/2017 at 3:03 PM by Kelsie Pittman

## 2017-08-10 NOTE — TELEPHONE ENCOUNTER
Pt is wondering 1. If the nodules will ever go away.    2. If any medications are needed/    3. When he would need another CT scan.    Routing to Dr. Chakraborty to advise.    Kari Barreto MA  2:29 PM 8/10/2017

## 2017-08-10 NOTE — TELEPHONE ENCOUNTER
This writer attempted to contact Martin on 08/10/17      Reason for call verify symptoms  and left message to return call.      When patient calls back, please contact 2nd floor [place] Care Team (MA/TC). If no one available, document that pt called and route to care team. routine priority .          Arabella Vasquez CMA

## 2017-08-15 NOTE — TELEPHONE ENCOUNTER
This writer attempted to contact Martin on 08/15/17      Reason for call results and left message to return call.      When patient calls back, please contact 2nd floor [place] Care Team (MA/TC). If no one available, document that pt called and route to care team. routine priority .          Kwasi Ordoñez, CMA

## 2017-08-15 NOTE — TELEPHONE ENCOUNTER
The nodules may never go away. The main thing we have to follow this for is to make sure this does not change into lung cancer due to this tobacco history.   No medication for this is needed  We can recheck CT scan in 1 year to ensure stability.    Thanks,    Purvi Chakraborty MD MPH

## 2017-08-16 NOTE — TELEPHONE ENCOUNTER
Call to patient, informed of Dr. Chakraborty's reply to his questions.     He said he will call next year to set up another CT.      Soledad Kim CMA

## 2017-09-27 ENCOUNTER — OFFICE VISIT (OUTPATIENT)
Dept: FAMILY MEDICINE | Facility: CLINIC | Age: 47
End: 2017-09-27
Payer: COMMERCIAL

## 2017-09-27 VITALS
SYSTOLIC BLOOD PRESSURE: 120 MMHG | HEIGHT: 64 IN | WEIGHT: 138.2 LBS | TEMPERATURE: 97.5 F | BODY MASS INDEX: 23.6 KG/M2 | HEART RATE: 92 BPM | OXYGEN SATURATION: 98 % | DIASTOLIC BLOOD PRESSURE: 74 MMHG

## 2017-09-27 DIAGNOSIS — G47.01 INSOMNIA DUE TO MEDICAL CONDITION: ICD-10-CM

## 2017-09-27 DIAGNOSIS — R73.03 PREDIABETES: ICD-10-CM

## 2017-09-27 DIAGNOSIS — R35.0 BENIGN PROSTATIC HYPERPLASIA WITH URINARY FREQUENCY: Primary | ICD-10-CM

## 2017-09-27 DIAGNOSIS — N40.1 BENIGN PROSTATIC HYPERPLASIA WITH URINARY FREQUENCY: Primary | ICD-10-CM

## 2017-09-27 DIAGNOSIS — Z23 NEED FOR PROPHYLACTIC VACCINATION AND INOCULATION AGAINST INFLUENZA: ICD-10-CM

## 2017-09-27 LAB
ALBUMIN UR-MCNC: NEGATIVE MG/DL
APPEARANCE UR: CLEAR
BILIRUB UR QL STRIP: NEGATIVE
COLOR UR AUTO: YELLOW
GLUCOSE UR STRIP-MCNC: NEGATIVE MG/DL
HGB UR QL STRIP: NEGATIVE
KETONES UR STRIP-MCNC: NEGATIVE MG/DL
LEUKOCYTE ESTERASE UR QL STRIP: NEGATIVE
NITRATE UR QL: NEGATIVE
PH UR STRIP: 5.5 PH (ref 5–7)
SOURCE: NORMAL
SP GR UR STRIP: >1.03 (ref 1–1.03)
UROBILINOGEN UR STRIP-ACNC: 0.2 EU/DL (ref 0.2–1)

## 2017-09-27 PROCEDURE — 99213 OFFICE O/P EST LOW 20 MIN: CPT | Performed by: NURSE PRACTITIONER

## 2017-09-27 PROCEDURE — 90686 IIV4 VACC NO PRSV 0.5 ML IM: CPT | Performed by: NURSE PRACTITIONER

## 2017-09-27 PROCEDURE — 90471 IMMUNIZATION ADMIN: CPT | Performed by: NURSE PRACTITIONER

## 2017-09-27 PROCEDURE — 81003 URINALYSIS AUTO W/O SCOPE: CPT | Performed by: NURSE PRACTITIONER

## 2017-09-27 NOTE — NURSING NOTE
"Chief Complaint   Patient presents with     Insomnia       Initial /74 (BP Location: Left arm, Patient Position: Sitting, Cuff Size: Adult Regular)  Pulse 92  Temp 97.5  F (36.4  C) (Oral)  Ht 5' 4\" (1.626 m)  Wt 138 lb 3.2 oz (62.7 kg)  SpO2 98%  BMI 23.72 kg/m2 Estimated body mass index is 23.72 kg/(m^2) as calculated from the following:    Height as of this encounter: 5' 4\" (1.626 m).    Weight as of this encounter: 138 lb 3.2 oz (62.7 kg).  Medication Reconciliation: complete   Arabella Vasquez MA      "

## 2017-09-27 NOTE — PROGRESS NOTES
SUBJECTIVE:   Martin Salvador is a 47 year old male who presents to clinic today for the following health issues:      Concern: Insomnia. Pt has difficulty sleeping at night, thinking too much at night, and urinating 4-5 times a night. He is not taking Flomax regularly. No dysuria but he notes some hesitancy, no dribbling or decrease in urine stream, no split stream, no abdominal, pelvic, or flank pain, no fever or chills.  Patient often is on his Ipad prior to bed, otherwise, has good sleep hygiene.  Onset: 3-4 months.    Problem list and histories reviewed & adjusted, as indicated.  Additional history: as documented    Patient Active Problem List   Diagnosis     External hemorrhoids     Internal hemorrhoids with other complication     Paraseptal emphysema (H)     Pulmonary nodules     Bladder calculi     Prediabetes     BPH (benign prostatic hyperplasia)     Former smoker     Gastroesophageal reflux disease, esophagitis presence not specified     Past Surgical History:   Procedure Laterality Date     LIGATION OF HEMORRHOID(S)         Social History   Substance Use Topics     Smoking status: Former Smoker     Packs/day: 1.00     Types: Cigarettes     Start date: 1/1/1984     Quit date: 1/16/2016     Smokeless tobacco: Never Used     Alcohol use Yes     Family History   Problem Relation Age of Onset     DIABETES Mother          BP Readings from Last 3 Encounters:   09/27/17 120/74   07/19/17 115/71   01/06/17 114/63    Wt Readings from Last 3 Encounters:   09/27/17 138 lb 3.2 oz (62.7 kg)   07/19/17 135 lb (61.2 kg)   01/06/17 135 lb (61.2 kg)                  Labs reviewed in EPIC        Reviewed and updated as needed this visit by clinical staff       Reviewed and updated as needed this visit by Provider         ROS:  Constitutional, HEENT, cardiovascular, pulmonary, gi and gu systems are negative, except as otherwise noted.      OBJECTIVE:   /74 (BP Location: Left arm, Patient Position: Sitting, Cuff Size:  "Adult Regular)  Pulse 92  Temp 97.5  F (36.4  C) (Oral)  Ht 5' 4\" (1.626 m)  Wt 138 lb 3.2 oz (62.7 kg)  SpO2 98%  BMI 23.72 kg/m2  Body mass index is 23.72 kg/(m^2).  GENERAL: healthy, alert and no distress  EYES: Eyes grossly normal to inspection, PERRL and conjunctivae and sclerae normal  HENT: ear canals and TM's normal, nose and mouth without ulcers or lesions  NECK: no adenopathy, no asymmetry, masses, or scars and thyroid normal to palpation  RESP: lungs clear to auscultation - no rales, rhonchi or wheezes  CV: regular rate and rhythm, normal S1 S2, no S3 or S4, no murmur, click or rub, no peripheral edema and peripheral pulses strong  ABDOMEN: soft, nontender, no hepatosplenomegaly, no masses and bowel sounds normal  MS: no gross musculoskeletal defects noted, no edema  SKIN: no suspicious lesions or rashes  NEURO: Normal strength and tone, mentation intact and speech normal  BACK: no CVA tenderness, no paralumbar tenderness  PSYCH: mentation appears normal, affect normal/bright  LYMPH: normal ant/post cervical, supraclavicular nodes    Diagnostic Test Results:  No results found for this or any previous visit (from the past 24 hour(s)).    ASSESSMENT/PLAN:         BP Screening:   Last 3 BP Readings:    BP Readings from Last 3 Encounters:   09/27/17 120/74   07/19/17 115/71   01/06/17 114/63       The following was recommended to the patient:  Re-screen BP within a year and recommended lifestyle modifications  BMI:   Estimated body mass index is 23.72 kg/(m^2) as calculated from the following:    Height as of this encounter: 5' 4\" (1.626 m).    Weight as of this encounter: 138 lb 3.2 oz (62.7 kg).           1. Benign prostatic hyperplasia with urinary frequency  Patient is to get back on the Flomax which should help with the urinary frequency which contributes to his insomnia.  Reviewed sleep hygiene measures.    - UA reflex to Microscopic and Culture    2. Insomnia due to medical condition  Likely due " to getting up to void due to BPH- getting him back on Flomax. Return to clinic if not improved, new, or worsening symptoms.      3. Prediabetes  Checking UA today as he was spilling sugar in to his urine 11/2016.  Last A1c 5.7 7/19/17.  - UA reflex to Microscopic and Culture    4. Need for prophylactic vaccination and inoculation against influenza        See Patient Instructions    TONI Pineda [facility]

## 2017-09-27 NOTE — LETTER
[address] [address]  [phone]                                                                                                           Martin HORTON Salvador  [address]  [facility] [place] [address]    September 28, 2017    Mike Salazar,     Your urinalysis was normal for you.  Good news!     Feel free to contact me with any questions or concerns.  Thank you for allowing me to participate in your care.     Maria Fernanda Juarez APRN, CNP/smj    Results for orders placed or performed in visit on 09/27/17   UA reflex to Microscopic and Culture   Result Value Ref Range    Color Urine Yellow     Appearance Urine Clear     Glucose Urine Negative NEG^Negative mg/dL    Bilirubin Urine Negative NEG^Negative    Ketones Urine Negative NEG^Negative mg/dL    Specific Gravity Urine >1.030 1.003 - 1.035    Blood Urine Negative NEG^Negative    pH Urine 5.5 5.0 - 7.0 pH    Protein Albumin Urine Negative NEG^Negative mg/dL    Urobilinogen Urine 0.2 0.2 - 1.0 EU/dL    Nitrite Urine Negative NEG^Negative    Leukocyte Esterase Urine Negative NEG^Negative    Source Midstream Urine

## 2017-09-27 NOTE — MR AVS SNAPSHOT
After Visit Summary   9/27/2017    Martin Salvador    MRN: [id number]           Patient Information     Date Of Birth          1970        Visit Information        Provider Department      9/27/2017 4:00 PM Maria Fernanda Juarez APRN CNP [facility]        Today's Diagnoses     Benign prostatic hyperplasia with urinary frequency    -  1    Prediabetes        Need for prophylactic vaccination and inoculation against influenza          Care Instructions    Based on your medical history and these are the current health maintenance or preventive care services that you are due for (some may have been done at this visit)  Health Maintenance Due   Topic Date Due     INFLUENZA VACCINE (SYSTEM ASSIGNED)  09/01/2017         At [facility], we strive to deliver an exceptional experience to you, every time we see you.    If you receive a survey in the mail, please send us back your thoughts. We really do value your feedback.    Your care team's suggested websites for health information:  Www.[employer]ift[employer] : Up to date and easily searchable information on multiple topics.  Www.medlineplus.gov : medication info, interactive tutorials, watch real surgeries online  Www.familydoctor.org : good info from the Academy of Family Physicians  Www.cdc.gov : public health info, travel advisories, epidemics (H1N1)  Www.aap.org : children's health info, normal development, vaccinations  Www.health.[facility].[place].us : [place] dept of health, public health issues in [place], N1N1    How to contact your care team:   [facility] [phone]         Pharmacy [phone]    Dr. Mccauley, Carly Guzmán PA-C, Ramonita Vale CNP, Karla Lindsay PA-C, Dr. Singh, and TONI Hammer CNP    Team RN: Moon      Clinic hours  M-Th 7 am-7 pm   Fri 7 am-5 pm.   Urgent care M-F 11 am-9 pm,   Sat/Sun 9 am-5 pm.  Pharmacy M-Th 8 am-8 pm Fri 8 am-6 pm  Sat/Sun 9 am-5 pm.     All  password changes, disabled accounts, or ID changes in [employer]MyHealth will be done by our Access Services Department.    If you need help with your account or password, call: [phone]. Clinic staff no longer has the ability to change passwords.     Benign Prostatic Hyperplasia    The prostate is a small gland that makes semen. As you age, the prostate grows. If it becomes too big, it may cause problems with urination. This condition is called benign prostatic hyperplasia (BPH).  Symptoms of BPH  BPH is common in men over age 60. That s because the prostate grows bigger during a man s life. As it grows, it presses against the urethra. The urethra carries urine out of your body from your bladder through your penis. Your bladder may also weaken as you age. It may not empty completely after you urinate.  Men with BPH may have these symptoms:    The urge to frequently urinate, especially at night    Leaking or dribbling of urine    A weak stream of urine    Not able to urinate, or having trouble starting to urinate  Diagnosing BPH  BPH can hurt your bladder and kidneys. It can also lead to bladder stones and urinary tract infections. If you think you may have BPH, talk with your healthcare provider. Early treatment can prevent problems.  Several tests can diagnose BPH. These include:    Digital rectal exam. During this procedure, your provider puts a gloved, greased (lubricated) finger into your rectum to check the size of your prostate.    Imaging tests. X-rays and other imaging tests can find problems in your kidneys or bladder.    Cystoscopy. This test uses a flexible tube with a camera (scope) to look inside your urinary tract.    Urine flow study. This test uses a special device to see how fast urine leaves your body.  Treating BPH  If you have mild symptoms, you may not need treatment. You may be able to control your BPH with lifestyle changes. Some men feel better if they limit or avoid alcohol and  caffeinated drinks like coffee. Not drinking too many fluids at night can also help. Increasing your physical activity may ease symptoms, too.  Kegel exercises may also help. They strengthen the pelvic muscle to prevent urine from leaking. While urinating, contract your pelvic muscle to stop or slow down the flow of urine. Hold for 10 seconds. Repeat at least 5 times. Do the exercise 3 to 5 times each day.  Certain medicines can worsen BPH symptoms. These include medicines for congestion, allergies, and depression. Medicines that increase your urine flow (diuretics) can also worsen BPH symptoms. If you take any of these, talk with your provider. You may need to take another medicine or change how much you take.  BPH symptoms often get worse as the prostate grows. So at some point you may need treatment. Your provider may prescribe medicine to shrink the prostate or stop its growth. Other treatments can make the urethra wider to let urine to flow more easily. There are also some minimally invasive techniques to remove prostate tissue.  If your BPH is severe, your health care provider may recommend surgery. Surgery takes out enlarged parts of the prostate gland. Your health care provider can figure out the best option for you based on your age, overall health, and other factors.  BPH and Prostate Cancer  BPH and prostate cancer share some symptoms. That s why it s important to talk with your provider about your symptoms. Men with BPH aren t more likely to develop this cancer. But they may have higher levels of the prostate-specific antigen (PSA). A higher PSA level may also be a sign of prostate cancer. Certain tests help distinguish BPH from prostate cancer. They include prostate ultrasound and biopsy.  Date Last Reviewed: 5/31/2015 2000-2017 The "[employer]. [address] [place] [address], [place] [address]. All rights reserved. This information is not intended as a substitute for professional medical care.  "Always follow your healthcare professional's instructions.                Follow-ups after your visit        Who to contact     If you have questions or need follow up information about today's clinic visit or your schedule please contact [facility] directly at [phone].  Normal or non-critical lab and imaging results will be communicated to you by MyChart, letter or phone within 4 business days after the clinic has received the results. If you do not hear from us within 7 days, please contact the clinic through MyChart or phone. If you have a critical or abnormal lab result, we will notify you by phone as soon as possible.  Submit refill requests through [employer] or call your pharmacy and they will forward the refill request to us. Please allow 3 business days for your refill to be completed.          Additional Information About Your Visit        [employer] Information     [employer] lets you send messages to your doctor, view your test results, renew your prescriptions, schedule appointments and more. To sign up, go to [email or web address] . Click on \"Log in\" on the left side of the screen, which will take you to the Welcome page. Then click on \"Sign up Now\" on the right side of the page.     You will be asked to enter the access code listed below, as well as some personal information. Please follow the directions to create your username and password.     Your access code is: [email or web address]  Expires: 10/17/2017  8:09 PM     Your access code will  in 90 days. If you need help or a new code, please call your [facility] clinic or [phone].        Care EveryWhere ID     This is your Care EveryWhere ID. This could be used by other organizations to access your [facility] medical records  [id number]        Your Vitals Were     Pulse Temperature Height Pulse Oximetry BMI (Body Mass Index)       92 97.5  F (36.4  C) (Oral) 5' 4\" (1.626 m) 98% 23.72 kg/m2        Blood Pressure from Last 3 " Encounters:   09/27/17 120/74   07/19/17 115/71   01/06/17 114/63    Weight from Last 3 Encounters:   09/27/17 138 lb 3.2 oz (62.7 kg)   07/19/17 135 lb (61.2 kg)   01/06/17 135 lb (61.2 kg)              We Performed the Following     UA reflex to Microscopic and Culture        Primary Care Provider Office Phone # Fax #    Purvi Chakraborty [phone]       [address]  [facility] [address]        Equal Access to Services     [facility]: Hadii aad ku hadasho Soomaali, waaxda luqadaha, qaybta kaalmada adeegyada, radha saldaña . So [facility] [phone].    ATENCIÓN: Si habla español, tiene a saenz disposición servicios gratuitos de asistencia lingüística. Ll[facility] [phone].    We comply with applicable federal civil rights laws and [place] laws. We do not discriminate on the basis of race, color, national origin, age, disability sex, sexual orientation or gender identity.            Thank you!     Thank you for choosing [facility]  for your care. Our goal is always to provide you with excellent care. Hearing back from our patients is one way we can continue to improve our services. Please take a few minutes to complete the written survey that you may receive in the mail after your visit with us. Thank you!             Your Updated Medication List - Protect others around you: Learn how to safely use, store and throw away your medicines at www.disposemymeds.org.          This list is accurate as of: 9/27/17  4:13 PM.  Always use your most recent med list.                   Brand Name Dispense Instructions for use Diagnosis    atorvastatin 20 MG tablet    LIPITOR    90 tablet    Take 1 tablet (20 mg) by mouth daily (with dinner)    Hyperlipidemia LDL goal <130       hydrocortisone 25 MG Suppository    ANUSOL-HC    28 suppository    Place 1 suppository (25 mg) rectally 2 times daily    Hemorrhoids, unspecified hemorrhoid type       metFORMIN 500 MG 24  hr tablet    GLUCOPHAGE-XR    90 tablet    TAKE 1 TABLET(500 MG) BY MOUTH DAILY WITH DINNER    Prediabetes       omeprazole 20 MG CR capsule    priLOSEC    90 capsule    Take 1 capsule (20 mg) by mouth daily    Gastroesophageal reflux disease, esophagitis presence not specified       tamsulosin 0.4 MG capsule    FLOMAX    180 capsule    Take 2 capsules (0.8 mg) by mouth At Bedtime    Benign nodular prostatic hyperplasia with lower urinary tract symptoms       triamcinolone 0.1 % paste    KENALOG    5 g    Take by mouth 2 times daily    Oral aphthous ulcer

## 2017-09-27 NOTE — PROGRESS NOTES
Injectable Influenza Immunization Documentation    1.  Is the person to be vaccinated sick today?   No    2. Does the person to be vaccinated have an allergy to a component   of the vaccine?   No    3. Has the person to be vaccinated ever had a serious reaction   to influenza vaccine in the past?   No    4. Has the person to be vaccinated ever had Guillain-Barré syndrome?   No    Form completed by Arabella Vasquez MA

## 2017-09-27 NOTE — PATIENT INSTRUCTIONS
Based on your medical history and these are the current health maintenance or preventive care services that you are due for (some may have been done at this visit)  Health Maintenance Due   Topic Date Due     INFLUENZA VACCINE (SYSTEM ASSIGNED)  09/01/2017         At [facility], we strive to deliver an exceptional experience to you, every time we see you.    If you receive a survey in the mail, please send us back your thoughts. We really do value your feedback.    Your care team's suggested websites for health information:  Www.[employer].org : Up to date and easily searchable information on multiple topics.  Www.medlineplus.gov : medication info, interactive tutorials, watch real surgeries online  Www.familydoctor.org : good info from the Academy of Family Physicians  Www.cdc.gov : public health info, travel advisories, epidemics (H1N1)  Www.aap.org : children's health info, normal development, vaccinations  Www.health.[facility].[place].us : [place] dept of health, public health issues in [place], N1N1    How to contact your care team:   [facility] [phone]         Pharmacy [phone]    Dr. Mccauley, Carly Guzmán PA-C, Dr. Chakraborty, Ramonita MUÑOZ CNP, Karla Lindsay PA-C, Dr. Singh, and TONI Hammer CNP    Team RN: Moon      Clinic hours  M-Th 7 am-7 pm   Fri 7 am-5 pm.   Urgent care M-F 11 am-9 pm,   Sat/Sun 9 am-5 pm.  Pharmacy M-Th 8 am-8 pm Fri 8 am-6 pm  Sat/Sun 9 am-5 pm.     All password changes, disabled accounts, or ID changes in [employer]MyHealth will be done by our Access Services Department.    If you need help with your account or password, call: [phone]. Clinic staff no longer has the ability to change passwords.     Benign Prostatic Hyperplasia    The prostate is a small gland that makes semen. As you age, the prostate grows. If it becomes too big, it may cause problems with urination. This condition is called benign prostatic hyperplasia  (BPH).  Symptoms of BPH  BPH is common in men over age 60. That s because the prostate grows bigger during a man s life. As it grows, it presses against the urethra. The urethra carries urine out of your body from your bladder through your penis. Your bladder may also weaken as you age. It may not empty completely after you urinate.  Men with BPH may have these symptoms:    The urge to frequently urinate, especially at night    Leaking or dribbling of urine    A weak stream of urine    Not able to urinate, or having trouble starting to urinate  Diagnosing BPH  BPH can hurt your bladder and kidneys. It can also lead to bladder stones and urinary tract infections. If you think you may have BPH, talk with your healthcare provider. Early treatment can prevent problems.  Several tests can diagnose BPH. These include:    Digital rectal exam. During this procedure, your provider puts a gloved, greased (lubricated) finger into your rectum to check the size of your prostate.    Imaging tests. X-rays and other imaging tests can find problems in your kidneys or bladder.    Cystoscopy. This test uses a flexible tube with a camera (scope) to look inside your urinary tract.    Urine flow study. This test uses a special device to see how fast urine leaves your body.  Treating BPH  If you have mild symptoms, you may not need treatment. You may be able to control your BPH with lifestyle changes. Some men feel better if they limit or avoid alcohol and caffeinated drinks like coffee. Not drinking too many fluids at night can also help. Increasing your physical activity may ease symptoms, too.  Kegel exercises may also help. They strengthen the pelvic muscle to prevent urine from leaking. While urinating, contract your pelvic muscle to stop or slow down the flow of urine. Hold for 10 seconds. Repeat at least 5 times. Do the exercise 3 to 5 times each day.  Certain medicines can worsen BPH symptoms. These include medicines for  congestion, allergies, and depression. Medicines that increase your urine flow (diuretics) can also worsen BPH symptoms. If you take any of these, talk with your provider. You may need to take another medicine or change how much you take.  BPH symptoms often get worse as the prostate grows. So at some point you may need treatment. Your provider may prescribe medicine to shrink the prostate or stop its growth. Other treatments can make the urethra wider to let urine to flow more easily. There are also some minimally invasive techniques to remove prostate tissue.  If your BPH is severe, your health care provider may recommend surgery. Surgery takes out enlarged parts of the prostate gland. Your health care provider can figure out the best option for you based on your age, overall health, and other factors.  BPH and Prostate Cancer  BPH and prostate cancer share some symptoms. That s why it s important to talk with your provider about your symptoms. Men with BPH aren t more likely to develop this cancer. But they may have higher levels of the prostate-specific antigen (PSA). A higher PSA level may also be a sign of prostate cancer. Certain tests help distinguish BPH from prostate cancer. They include prostate ultrasound and biopsy.  Date Last Reviewed: 5/31/2015 2000-2017 [employer]. [address] [place] [address], [place] [address]. All rights reserved. This information is not intended as a substitute for professional medical care. Always follow your healthcare professional's instructions.

## 2017-12-27 DIAGNOSIS — N40.1 BENIGN NODULAR PROSTATIC HYPERPLASIA WITH LOWER URINARY TRACT SYMPTOMS: ICD-10-CM

## 2017-12-28 NOTE — TELEPHONE ENCOUNTER
Requested Prescriptions   Pending Prescriptions Disp Refills     finasteride (PROSCAR) 5 MG tablet [Pharmacy Med Name: FINASTERIDE 5MG TABLETS]  Last Written Prescription Date:  11/17/17  Last Fill Quantity: 30,  # refills: 0   Last Office Visit with [facility], [facility] or [facility] prescribing provider:  09/27/17   Future Office Visit:    30 tablet 0     Sig: TAKE 1 TABLET BY MOUTH DAILY    There is no refill protocol information for this order

## 2018-01-03 RX ORDER — FINASTERIDE 5 MG/1
TABLET, FILM COATED ORAL
Qty: 30 TABLET | Refills: 0 | Status: SHIPPED | OUTPATIENT
Start: 2018-01-03 | End: 2018-03-31

## 2018-01-27 ENCOUNTER — OFFICE VISIT (OUTPATIENT)
Dept: URGENT CARE | Facility: URGENT CARE | Age: 48
End: 2018-01-27
Payer: COMMERCIAL

## 2018-01-27 VITALS
BODY MASS INDEX: 23.86 KG/M2 | HEART RATE: 91 BPM | WEIGHT: 139 LBS | TEMPERATURE: 97.3 F | SYSTOLIC BLOOD PRESSURE: 124 MMHG | RESPIRATION RATE: 14 BRPM | DIASTOLIC BLOOD PRESSURE: 67 MMHG

## 2018-01-27 DIAGNOSIS — K05.00 GINGIVITIS, ACUTE: Primary | ICD-10-CM

## 2018-01-27 PROCEDURE — 99213 OFFICE O/P EST LOW 20 MIN: CPT | Performed by: NURSE PRACTITIONER

## 2018-01-27 NOTE — PROGRESS NOTES
SUBJECTIVE:                                                    Martin Salvador is a 47 year old male who presents to clinic today for the following health issues:      Stuart gum      Duration: 2 day    Description (location/character/radiation): swollen gum    Intensity:  moderate    Accompanying signs and symptoms:     History (similar episodes/previous evaluation): None    Precipitating or alleviating factors: None    Therapies tried and outcome: advil           Problem list and histories reviewed & adjusted, as indicated.  Additional history: as documented    Patient Active Problem List   Diagnosis     External hemorrhoids     Internal hemorrhoids with other complication     Paraseptal emphysema (H)     Pulmonary nodules     Bladder calculi     Prediabetes     BPH (benign prostatic hyperplasia)     Former smoker     Gastroesophageal reflux disease, esophagitis presence not specified     Past Surgical History:   Procedure Laterality Date     LIGATION OF HEMORRHOID(S)         Social History   Substance Use Topics     Smoking status: Former Smoker     Packs/day: 1.00     Types: Cigarettes     Start date: 1/1/1984     Quit date: 1/16/2016     Smokeless tobacco: Never Used     Alcohol use Yes     Family History   Problem Relation Age of Onset     DIABETES Mother          Current Outpatient Prescriptions   Medication Sig Dispense Refill     amoxicillin-clavulanate (AUGMENTIN) 875-125 MG per tablet Take 1 tablet by mouth 2 times daily for 10 days 20 tablet 0     finasteride (PROSCAR) 5 MG tablet TAKE 1 TABLET BY MOUTH DAILY 30 tablet 0     metFORMIN (GLUCOPHAGE-XR) 500 MG 24 hr tablet TAKE 1 TABLET(500 MG) BY MOUTH DAILY WITH DINNER 90 tablet 0     omeprazole (PRILOSEC) 20 MG CR capsule Take 1 capsule (20 mg) by mouth daily 90 capsule 1     hydrocortisone (ANUSOL-HC) 25 MG Suppository Place 1 suppository (25 mg) rectally 2 times daily 28 suppository 0     atorvastatin (LIPITOR) 20 MG tablet Take 1 tablet (20 mg) by  mouth daily (with dinner) 90 tablet 3     tamsulosin (FLOMAX) 0.4 MG capsule Take 2 capsules (0.8 mg) by mouth At Bedtime 180 capsule 1     triamcinolone (KENALOG) 0.1 % paste Take by mouth 2 times daily 5 g 1     No Known Allergies    ROS:  C: NEGATIVE for fever, chills, change in weight  E/M: NEGATIVE for ear, mouth and throat problems  ENT/MOUTH: POSITIVE for swollen gums  R: NEGATIVE for significant cough or SOB  CV: NEGATIVE for chest pain, palpitations or peripheral edema    OBJECTIVE:     /67  Pulse 91  Temp 97.3  F (36.3  C)  Resp 14  Wt 139 lb (63 kg)  BMI 23.86 kg/m2  Body mass index is 23.86 kg/(m^2).  GENERAL: healthy, alert and no distress  NECK: no adenopathy, no asymmetry, masses, or scars and thyroid normal to palpation  Mouth: right upper inner gum is erythematous, inflamed and swollen, tender to touch  RESP: lungs clear to auscultation - no rales, rhonchi or wheezes  CV: regular rate and rhythm, normal S1 S2, no S3 or S4, no murmur, click or rub, no peripheral edema and peripheral pulses strong  ABDOMEN: soft, nontender, no hepatosplenomegaly, no masses and bowel sounds normal  MS: no gross musculoskeletal defects noted, no edema    Diagnostic Test Results:  none     ASSESSMENT/PLAN:       ICD-10-CM    1. Gingivitis, acute K05.00 amoxicillin-clavulanate (AUGMENTIN) 875-125 MG per tablet     Dental hygiene is dicussed  Antibiotics as prescribed.  Follow up with a dentist    Patient educational/instructional material provided including reasons for follow-up    The patient indicates understanding of these issues and agrees with the plan.      Sowmya Lan NP  [facility]

## 2018-01-27 NOTE — PATIENT INSTRUCTIONS
Understanding Gingivitis  Gingivitis is a type of gum disease. It is an inflammation of the gums that causes redness and swelling. It s most often caused by infection from bacteria on the teeth. A severe infection can cause small painful sores on the gums, bad breath, and bleeding gums. If untreated, gingivitis can lead to periodontal disease. Over time, this can cause tooth loss.  What causes gingivitis?  Gingivitis is most often caused when plaque builds up on your teeth. Plaque is a sticky film made of bacteria and other substances that coats your teeth. Brushing and flossing helps remove plaque. If you don t brush and floss regularly, plaque can build up and lead to gingivitis.  You are more likely to have gingivitis if you:    Don t brush or floss regularly    Smoke or chew tobacco    Are pregnant    Have diabetes    Use medicines such as birth control pills, steroids, drugs used to treat epilepsy, or cancer drugs    Have family members who tend to get gingivitis  Signs and symptoms  You may have gingivitis if your gums have areas that:    Are swollen    Are bright red or dusky red    Bleed easily    Are sore  Treating gingivitis    See your dentist. He or she may clean your teeth and remove buildup on your teeth of plaque and tartar. Tartar is a mixture of plaque and minerals that forms into a hard substance.    Use an antiseptic mouth rinse if your dentist tells you to.    Take antibiotics and other medicines exactly as directed. Don t stop taking them when your symptoms go away.    Make sure you brush at least twice a day, and use dental floss at least once a day. This will help remove plaque from your teeth.    Eat a soft diet, if needed, to ease discomfort. Avoid food and beverages that may cause more discomfort in your gums. These include citrus juices such as orange juice and lemonade, and salty or spicy foods.    Use acetaminophen or ibuprofen for pain or fever. If you have liver or kidney disease or  ever had a stomach ulcer or gastrointestinaI bleeding, talk with your healthcare provider before using these medicines.  When to call the healthcare provider  Call your healthcare provider if you have:    Pain that doesn t go away or gets worse    Gums that have pulled away from your teeth    A bad taste in your mouth that won t go away    Teeth that become loose    Inability to eat or drink because of mouth pain      Date Last Reviewed: 9/19/2015 2000-2017 [employer]. [address] [place] [address], [place] [id number]. All rights reserved. This information is not intended as a substitute for professional medical care. Always follow your healthcare professional's instructions.

## 2018-01-27 NOTE — MR AVS SNAPSHOT
After Visit Summary   1/27/2018    Martin Salvador    MRN: [id number]           Patient Information     Date Of Birth          1970        Visit Information        Provider Department      1/27/2018 4:15 PM Sowmya Lan NP [facility]        Today's Diagnoses     Gingivitis, acute    -  1      Care Instructions      Understanding Gingivitis  Gingivitis is a type of gum disease. It is an inflammation of the gums that causes redness and swelling. It s most often caused by infection from bacteria on the teeth. A severe infection can cause small painful sores on the gums, bad breath, and bleeding gums. If untreated, gingivitis can lead to periodontal disease. Over time, this can cause tooth loss.  What causes gingivitis?  Gingivitis is most often caused when plaque builds up on your teeth. Plaque is a sticky film made of bacteria and other substances that coats your teeth. Brushing and flossing helps remove plaque. If you don t brush and floss regularly, plaque can build up and lead to gingivitis.  You are more likely to have gingivitis if you:    Don t brush or floss regularly    Smoke or chew tobacco    Are pregnant    Have diabetes    Use medicines such as birth control pills, steroids, drugs used to treat epilepsy, or cancer drugs    Have family members who tend to get gingivitis  Signs and symptoms  You may have gingivitis if your gums have areas that:    Are swollen    Are bright red or dusky red    Bleed easily    Are sore  Treating gingivitis    See your dentist. He or she may clean your teeth and remove buildup on your teeth of plaque and tartar. Tartar is a mixture of plaque and minerals that forms into a hard substance.    Use an antiseptic mouth rinse if your dentist tells you to.    Take antibiotics and other medicines exactly as directed. Don t stop taking them when your symptoms go away.    Make sure you brush at least twice a day, and use dental floss at least once a  day. This will help remove plaque from your teeth.    Eat a soft diet, if needed, to ease discomfort. Avoid food and beverages that may cause more discomfort in your gums. These include citrus juices such as orange juice and lemonade, and salty or spicy foods.    Use acetaminophen or ibuprofen for pain or fever. If you have liver or kidney disease or ever had a stomach ulcer or gastrointestinaI bleeding, talk with your healthcare provider before using these medicines.  When to call the healthcare provider  Call your healthcare provider if you have:    Pain that doesn t go away or gets worse    Gums that have pulled away from your teeth    A bad taste in your mouth that won t go away    Teeth that become loose    Inability to eat or drink because of mouth pain      Date Last Reviewed: 9/19/2015 2000-2017 [employer]. [address] [place] [address]. All rights reserved. This information is not intended as a substitute for professional medical care. Always follow your healthcare professional's instructions.                Follow-ups after your visit        Who to contact     If you have questions or need follow up information about today's clinic visit or your schedule please contact [facility] directly at [phone].  Normal or non-critical lab and imaging results will be communicated to you by MyChart, letter or phone within 4 business days after the clinic has received the results. If you do not hear from us within 7 days, please contact the clinic through [employer] or phone. If you have a critical or abnormal lab result, we will notify you by phone as soon as possible.  Submit refill requests through Jamn or call your pharmacy and they will forward the refill request to us. Please allow 3 business days for your refill to be completed.          Additional Information About Your Visit        Jamn Information     Jamn lets you send messages to your doctor, view  "your test results, renew your prescriptions, schedule appointments and more. To sign up, go to [email or web address] . Click on \"Log in\" on the left side of the screen, which will take you to the Welcome page. Then click on \"Sign up Now\" on the right side of the page.     You will be asked to enter the access code listed below, as well as some personal information. Please follow the directions to create your username and password.     Your access code is: [email or web address]  Expires: 2018  4:51 PM     Your access code will  in 90 days. If you need help or a new code, please call your [facility] clinic or [phone].        Care EveryWhere ID     This is your Care EveryWhere ID. This could be used by other organizations to access your [facility] medical records  [id number]        Your Vitals Were     Pulse Temperature Respirations BMI (Body Mass Index)          91 97.3  F (36.3  C) 14 23.86 kg/m2         Blood Pressure from Last 3 Encounters:   18 124/67   17 120/74   17 115/71    Weight from Last 3 Encounters:   18 139 lb (63 kg)   17 138 lb 3.2 oz (62.7 kg)   17 135 lb (61.2 kg)              Today, you had the following     No orders found for display         Today's Medication Changes          These changes are accurate as of 18  4:51 PM.  If you have any questions, ask your nurse or doctor.               Start taking these medicines.        Dose/Directions    amoxicillin-clavulanate 875-125 MG per tablet   Commonly known as:  AUGMENTIN   Used for:  Gingivitis, acute   Started by:  Sowmya Lan NP        Dose:  1 tablet   Take 1 tablet by mouth 2 times daily for 10 days   Quantity:  20 tablet   Refills:  0            Where to get your medicines      These medications were sent to [facility] [address] [facility] [address] [facility] [address] [facility]  [address] [facility] [address]    Hours:  24-hours Phone:  [phone]     " amoxicillin-clavulanate 875-125 MG per tablet                Primary Care Provider Office Phone # Fax #    Purvi Chakraborty [phone]       [address]  [facility] [address]        Equal Access to Services     KIMO HERRERA : Hadii alan ku hadjesuso Soomaali, waaxda luqadaha, qaybta kaalmada adeegyada, waxay idiin hayaan aderylee oliverjovanna parr. So [facility] [phone].    ATENCIÓN: Si habla español, tiene a saenz disposición servicios gratuitos de asistencia lingüística. Llame al [phone].    We comply with applicable federal civil rights laws and [place] laws. We do not discriminate on the basis of race, color, national origin, age, disability, sex, sexual orientation, or gender identity.            Thank you!     Thank you for choosing [facility]  for your care. Our goal is always to provide you with excellent care. Hearing back from our patients is one way we can continue to improve our services. Please take a few minutes to complete the written survey that you may receive in the mail after your visit with us. Thank you!             Your Updated Medication List - Protect others around you: Learn how to safely use, store and throw away your medicines at www.disposemymeds.org.          This list is accurate as of 1/27/18  4:51 PM.  Always use your most recent med list.                   Brand Name Dispense Instructions for use Diagnosis    amoxicillin-clavulanate 875-125 MG per tablet    AUGMENTIN    20 tablet    Take 1 tablet by mouth 2 times daily for 10 days    Gingivitis, acute       atorvastatin 20 MG tablet    LIPITOR    90 tablet    Take 1 tablet (20 mg) by mouth daily (with dinner)    Hyperlipidemia LDL goal <130       finasteride 5 MG tablet    PROSCAR    30 tablet    TAKE 1 TABLET BY MOUTH DAILY    Benign nodular prostatic hyperplasia with lower urinary tract symptoms       hydrocortisone 25 MG Suppository    ANUSOL-HC    28 suppository    Place 1 suppository (25  mg) rectally 2 times daily    Hemorrhoids, unspecified hemorrhoid type       metFORMIN 500 MG 24 hr tablet    GLUCOPHAGE-XR    90 tablet    TAKE 1 TABLET(500 MG) BY MOUTH DAILY WITH DINNER    Prediabetes       omeprazole 20 MG CR capsule    priLOSEC    90 capsule    Take 1 capsule (20 mg) by mouth daily    Gastroesophageal reflux disease, esophagitis presence not specified       tamsulosin 0.4 MG capsule    FLOMAX    180 capsule    Take 2 capsules (0.8 mg) by mouth At Bedtime    Benign nodular prostatic hyperplasia with lower urinary tract symptoms       triamcinolone 0.1 % paste    KENALOG    5 g    Take by mouth 2 times daily    Oral aphthous ulcer

## 2018-03-06 DIAGNOSIS — K21.9 GASTROESOPHAGEAL REFLUX DISEASE, ESOPHAGITIS PRESENCE NOT SPECIFIED: ICD-10-CM

## 2018-03-07 DIAGNOSIS — R73.03 PREDIABETES: ICD-10-CM

## 2018-03-07 DIAGNOSIS — K21.9 GASTROESOPHAGEAL REFLUX DISEASE, ESOPHAGITIS PRESENCE NOT SPECIFIED: ICD-10-CM

## 2018-03-07 DIAGNOSIS — N40.1 BENIGN NODULAR PROSTATIC HYPERPLASIA WITH LOWER URINARY TRACT SYMPTOMS: ICD-10-CM

## 2018-03-07 NOTE — TELEPHONE ENCOUNTER
"Requested Prescriptions   Pending Prescriptions Disp Refills     omeprazole (PRILOSEC) 20 MG CR capsule [Pharmacy Med Name: OMEPRAZOLE 20MG CAPSULES]    Last Written Prescription Date:  7/19/17  Last Fill Quantity: 90,  # refills: 1   Last Office Visit with G, [facility] or [facility] prescribing provider:  9/27/17.a   Future Office Visit:      90 capsule 0     Sig: TAKE ONE CAPSULE BY MOUTH EVERY DAY    PPI Protocol Passed    3/6/2018  7:29 PM       Passed - Not on Clopidogrel (unless Pantoprazole ordered)       Passed - No diagnosis of osteoporosis on record       Passed - Recent (12 mo) or future (30 days) visit within the authorizing provider's specialty    Patient had office visit in the last year or has a visit in the next 30 days with authorizing provider.  See \"Patient Info\" tab in inbasket, or \"Choose Columns\" in Meds & Orders section of the refill encounter.            Passed - Patient is age 18 or older              Cory Faarax  [facility]  "

## 2018-03-07 NOTE — TELEPHONE ENCOUNTER
"Requested Prescriptions   Pending Prescriptions Disp Refills     omeprazole (PRILOSEC) 20 MG CR capsule [Pharmacy Med Name: OMEPRAZOLE 20MG CAPSULES]  Last Written Prescription Date:  07/19/17  Last Fill Quantity: 90,  # refills: 1   Last Office Visit with [facility] or [facility] prescribing provider:  09/27/17   Future Office Visit:    90 capsule 0     Sig: TAKE 1 CAPSULE(20 MG) BY MOUTH DAILY    PPI Protocol Passed    3/7/2018  6:55 AM       Passed - Not on Clopidogrel (unless Pantoprazole ordered)       Passed - No diagnosis of osteoporosis on record       Passed - Recent (12 mo) or future (30 days) visit within the authorizing provider's specialty    Patient had office visit in the last year or has a visit in the next 30 days with authorizing provider.  See \"Patient Info\" tab in [employer], or \"Choose Columns\" in Meds & Orders section of the refill encounter.            Passed - Patient is age 18 or older        tamsulosin (FLOMAX) 0.4 MG capsule [Pharmacy Med Name: TAMSULOSIN 0.4MG CAPSULES]  Last Written Prescription Date:  07/19/17  Last Fill Quantity: 180,  # refills: 1   Last Office Visit with [facility] or [facility] prescribing provider:  09/27/17   Future Office Visit:    180 capsule 0     Sig: TAKE 2 CAPSULES(0.8 MG) BY MOUTH AT BEDTIME    Alpha Blockers Passed    3/7/2018  6:55 AM       Passed - Blood pressure under 140/90 in past 12 months    BP Readings from Last 3 Encounters:   01/27/18 124/67   09/27/17 120/74   07/19/17 115/71                Passed - Recent (12 mo) or future (30 days) visit within the authorizing provider's specialty    Patient had office visit in the last year or has a visit in the next 30 days with authorizing provider.  See \"Patient Info\" tab in [employer], or \"Choose Columns\" in Meds & Orders section of the refill encounter.            Passed - Patient does not have Tadalafil, Vardenafil, or Sildenafil on their medication list       Passed - Patient is 18 years of age or older          "

## 2018-03-07 NOTE — TELEPHONE ENCOUNTER
"Requested Prescriptions   Pending Prescriptions Disp Refills     metFORMIN (GLUCOPHAGE-XR) 500 MG 24 hr tablet [Pharmacy Med Name: METFORMIN ER 500MG 24HR TABS]  Last Written Prescription Date:  07/19/17  Last Fill Quantity: 90,  # refills: 0   Last Office Visit with G, [facility] or [facility] prescribing provider:  09/27/17   Future Office Visit:    90 tablet 0     Sig: TAKE 1 TABLET BY MOUTH EVERY DAY WITH DINNER    Biguanide Agents Failed    3/7/2018  3:57 AM       Failed - Patient has had a Microalbumin in the past 12 mos.    No lab results found.         Failed - Patient has documented A1c within the specified period of time.    Recent Labs   Lab Test  07/19/17   1647   A1C  5.7            Passed - Blood pressure less than 140/90 in past 6 months    BP Readings from Last 3 Encounters:   01/27/18 124/67   09/27/17 120/74   07/19/17 115/71                Passed - Patient has documented LDL within the past 12 mos.    Recent Labs   Lab Test  07/10/17   0838   LDL  164*            Passed - Patient is age 10 or older       Passed - Patient's CR is NOT>1.4 OR Patient's EGFR is NOT<45 within past 12 mos.    Recent Labs   Lab Test  12/24/15   0815   GFRESTIMATED  >90  Non  GFR Calc     GFRESTBLACK  >90   GFR Calc         Recent Labs   Lab Test  12/24/15   0815   CR  0.77            Passed - Patient does NOT have a diagnosis of CHF.       Passed - Recent (6 mo) or future (30 days) visit within the authorizing provider's specialty    Patient had office visit in the last 6 months or has a visit in the next 30 days with authorizing provider.  See \"Patient Info\" tab in inbasket, or \"Choose Columns\" in Meds & Orders section of the refill encounter.              "

## 2018-03-08 RX ORDER — METFORMIN HCL 500 MG
TABLET, EXTENDED RELEASE 24 HR ORAL
Qty: 90 TABLET | Refills: 0 | Status: SHIPPED | OUTPATIENT
Start: 2018-03-08 | End: 2018-03-16

## 2018-03-08 RX ORDER — TAMSULOSIN HYDROCHLORIDE 0.4 MG/1
CAPSULE ORAL
Qty: 180 CAPSULE | Refills: 1 | Status: SHIPPED | OUTPATIENT
Start: 2018-03-08 | End: 2019-12-31

## 2018-03-08 NOTE — TELEPHONE ENCOUNTER
Flomax refilled per [facility] refill protocol.  Omeprazole filled earlier today. This is duplicate request. Refill removed.    Adilia Fagan RN  [facility] Triage

## 2018-03-08 NOTE — TELEPHONE ENCOUNTER
Prescription approved per [facility] [place].    Toño Saldaña RN, BSN

## 2018-03-08 NOTE — TELEPHONE ENCOUNTER
Routing refill request to provider for review/approval because:  Labs not current:  Microalbumin and Hgb A1c not up to date.  A break in medication: last given 7/19/17, #90 with 0 refills.  No future appointment scheduled at this time.    Adilia Fagan RN  [facility] Triage

## 2018-03-09 NOTE — TELEPHONE ENCOUNTER
Due for office visit and labs.   Purvi Chakraborty MD MPH   (Routing comment)   Called and spoke to patient and gave him Dr Chakraborty's message.  Scheduled him an appointment on 3/14/18 at 6:20 pm, per patient  He can only come in the evening.  Sintia Brar MA/  For Teams Ana

## 2018-03-14 ENCOUNTER — OFFICE VISIT (OUTPATIENT)
Dept: FAMILY MEDICINE | Facility: CLINIC | Age: 48
End: 2018-03-14
Payer: COMMERCIAL

## 2018-03-14 ENCOUNTER — TELEPHONE (OUTPATIENT)
Dept: FAMILY MEDICINE | Facility: CLINIC | Age: 48
End: 2018-03-14

## 2018-03-14 VITALS
WEIGHT: 140 LBS | TEMPERATURE: 96.4 F | HEIGHT: 64 IN | SYSTOLIC BLOOD PRESSURE: 125 MMHG | DIASTOLIC BLOOD PRESSURE: 78 MMHG | BODY MASS INDEX: 23.9 KG/M2 | OXYGEN SATURATION: 100 % | HEART RATE: 91 BPM

## 2018-03-14 DIAGNOSIS — R73.03 PREDIABETES: Primary | ICD-10-CM

## 2018-03-14 DIAGNOSIS — E78.5 HYPERLIPIDEMIA LDL GOAL <130: ICD-10-CM

## 2018-03-14 LAB — HBA1C MFR BLD: 6.2 % (ref 4.3–6)

## 2018-03-14 PROCEDURE — 80048 BASIC METABOLIC PNL TOTAL CA: CPT | Performed by: PREVENTIVE MEDICINE

## 2018-03-14 PROCEDURE — 99214 OFFICE O/P EST MOD 30 MIN: CPT | Performed by: PREVENTIVE MEDICINE

## 2018-03-14 PROCEDURE — 36415 COLL VENOUS BLD VENIPUNCTURE: CPT | Performed by: PREVENTIVE MEDICINE

## 2018-03-14 PROCEDURE — 83036 HEMOGLOBIN GLYCOSYLATED A1C: CPT | Performed by: PREVENTIVE MEDICINE

## 2018-03-14 PROCEDURE — 83721 ASSAY OF BLOOD LIPOPROTEIN: CPT | Performed by: PREVENTIVE MEDICINE

## 2018-03-14 RX ORDER — METFORMIN HCL 500 MG
TABLET, EXTENDED RELEASE 24 HR ORAL
Qty: 90 TABLET | Refills: 0 | Status: CANCELLED | OUTPATIENT
Start: 2018-03-14

## 2018-03-14 ASSESSMENT — PAIN SCALES - GENERAL: PAINLEVEL: NO PAIN (0)

## 2018-03-14 NOTE — MR AVS SNAPSHOT
After Visit Summary   3/14/2018    Martin Salvador    MRN: [id number]           Patient Information     Date Of Birth          1970        Visit Information        Provider Department      3/14/2018 6:20 PM Purvi Chakraborty MD [facility]        Today's Diagnoses     Prediabetes    -  1    Hyperlipidemia LDL goal <130          Care Instructions    At [facility], we strive to deliver an exceptional experience to you, every time we see you.  If you receive a survey in the mail, please send us back your thoughts. We really do value your feedback.    Based on your medical history, these are the current health maintenance/preventive care services that you are due for (some may have been done at this visit.)  There are no preventive care reminders to display for this patient.      Suggested websites for health information:  Www.[place].BehavioSec : Up to date and easily searchable information on multiple topics.  Www.medlineplus.gov : medication info, interactive tutorials, watch real surgeries online  Www.familydoctor.org : good info from the Academy of Family Physicians  Www.cdc.gov : public health info, travel advisories, epidemics (H1N1)  Www.aap.org : children's health info, normal development, vaccinations  Www.health.state[place]us : [place] dept of health, public health issues in [place], N1N1    Your care team:                            Family Medicine Internal Medicine   MD Landon Keith MD Shantel Branch-Fleming, MD Katya Georgiev PA-C Megan Hill, TONI Hollins MD Pediatrics   MARK Posey, ADRY Isaac APRN CNP   MD Wanda Peters MD Deborah Mielke, MD Kim Thein, APRN [facility]      Clinic hours: Monday - Thursday 7 am-7 pm; Fridays 7 am-5 pm.   Urgent care: Monday - Friday 11 am-9 pm; Saturday and Sunday 9 am-5 pm.  Pharmacy : Monday -Thursday 8 am-8 pm; Friday 8 am-6 pm; Saturday and Sunday 9 am-5  "pm.     Clinic: [phone]   Pharmacy: [phone]                Follow-ups after your visit        Follow-up notes from your care team     Return in about 6 months (around 2018) for Routine Visit, Lab Work.      Who to contact     If you have questions or need follow up information about today's clinic visit or your schedule please contact [facility] [place] directly at [phone].  Normal or non-critical lab and imaging results will be communicated to you by ReviverMxhart, letter or phone within 4 business days after the clinic has received the results. If you do not hear from us within 7 days, please contact the clinic through ReviverMxhart or phone. If you have a critical or abnormal lab result, we will notify you by phone as soon as possible.  Submit refill requests through Unpakt or call your pharmacy and they will forward the refill request to us. Please allow 3 business days for your refill to be completed.          Additional Information About Your Visit        Unpakt Information     Unpakt lets you send messages to your doctor, view your test results, renew your prescriptions, schedule appointments and more. To sign up, go to [email or web address] . Click on \"Log in\" on the left side of the screen, which will take you to the Welcome page. Then click on \"Sign up Now\" on the right side of the page.     You will be asked to enter the access code listed below, as well as some personal information. Please follow the directions to create your username and password.     Your access code is: [email or web address]  Expires: 2018  5:51 PM     Your access code will  in 90 days. If you need help or a new code, please call your [place] clinic or [phone].        Care EveryWhere ID     This is your Care EveryWhere ID. This could be used by other organizations to access your [facility] medical records  [id number]        Your Vitals Were     Pulse Temperature Height Pulse Oximetry BMI (Body Mass " "Index)       91 96.4  F (35.8  C) (Oral) 5' 4\" (1.626 m) 100% 24.03 kg/m2        Blood Pressure from Last 3 Encounters:   03/14/18 125/78   01/27/18 124/67   09/27/17 120/74    Weight from Last 3 Encounters:   03/14/18 140 lb (63.5 kg)   01/27/18 139 lb (63 kg)   09/27/17 138 lb 3.2 oz (62.7 kg)              We Performed the Following     Basic metabolic panel     Hemoglobin A1c     LDL cholesterol direct          Today's Medication Changes          These changes are accurate as of 3/14/18 11:59 PM.  If you have any questions, ask your nurse or doctor.               These medicines have changed or have updated prescriptions.        Dose/Directions    metFORMIN 500 MG 24 hr tablet   Commonly known as:  GLUCOPHAGE-XR   This may have changed:  See the new instructions.   Used for:  Prediabetes   Changed by:  Purvi Chakraboryt MD        TAKE 1 TABLET BY MOUTH EVERY DAY WITH DINNER   Quantity:  90 tablet   Refills:  1            Where to get your medicines      [facility] [address] [place] [address] [address] [facility]  [address] [facility] [address]    Hours:  24-hours Phone:  [phone]     atorvastatin 20 MG tablet    metFORMIN 500 MG 24 hr tablet                Primary Care Provider Office Phone # Fax #    Purvi Chakraborty [phone]       [address]  [place] [address]        Equal Access to Services     [facility] AH: Hadii alan roldan hadasho Sogino, waaxda luqadaha, qaybta kaalmada adeegyada, radha parr. So [facility] [phone].    ATENCIÓN: Si habla español, tiene a saenz disposición servicios gratuitos de asistencia lingüística. Llame al [phone].    We comply with applicable federal civil rights laws and [place] laws. We do not discriminate on the basis of race, color, national origin, age, disability, sex, sexual orientation, or gender identity.            Thank you!     " Thank you for choosing [facility]  for your care. Our goal is always to provide you with excellent care. Hearing back from our patients is one way we can continue to improve our services. Please take a few minutes to complete the written survey that you may receive in the mail after your visit with us. Thank you!             Your Updated Medication List - Protect others around you: Learn how to safely use, store and throw away your medicines at www.disposemymeds.org.          This list is accurate as of 3/14/18 11:59 PM.  Always use your most recent med list.                   Brand Name Dispense Instructions for use Diagnosis    atorvastatin 20 MG tablet    LIPITOR    90 tablet    Take 1 tablet (20 mg) by mouth daily (with dinner)    Hyperlipidemia LDL goal <130       finasteride 5 MG tablet    PROSCAR    30 tablet    TAKE 1 TABLET BY MOUTH DAILY    Benign nodular prostatic hyperplasia with lower urinary tract symptoms       hydrocortisone 25 MG Suppository    ANUSOL-HC    28 suppository    Place 1 suppository (25 mg) rectally 2 times daily    Hemorrhoids, unspecified hemorrhoid type       metFORMIN 500 MG 24 hr tablet    GLUCOPHAGE-XR    90 tablet    TAKE 1 TABLET BY MOUTH EVERY DAY WITH DINNER    Prediabetes       * omeprazole 20 MG CR capsule    priLOSEC    90 capsule    Take 1 capsule (20 mg) by mouth daily    Gastroesophageal reflux disease, esophagitis presence not specified       * omeprazole 20 MG CR capsule    priLOSEC    90 capsule    TAKE ONE CAPSULE BY MOUTH EVERY DAY    Gastroesophageal reflux disease, esophagitis presence not specified       tamsulosin 0.4 MG capsule    FLOMAX    180 capsule    TAKE 2 CAPSULES(0.8 MG) BY MOUTH AT BEDTIME    Benign nodular prostatic hyperplasia with lower urinary tract symptoms       triamcinolone 0.1 % paste    KENALOG    5 g    Take by mouth 2 times daily    Oral aphthous ulcer       * Notice:  This list has 2 medication(s) that are the same as  other medications prescribed for you. Read the directions carefully, and ask your doctor or other care provider to review them with you.

## 2018-03-14 NOTE — PROGRESS NOTES
SUBJECTIVE:   Martin Salvador is a 47 year old male who presents to clinic today for the following health issues:      Pre Diabetes Follow-up      Patient is checking blood sugars: not at all    Diabetic concerns: None     Symptoms of hypoglycemia (low blood sugar): none     Paresthesias (numbness or burning in feet) or sores: No     Date of last diabetic eye exam: UTD    BP Readings from Last 2 Encounters:   03/14/18 125/78   01/27/18 124/67     Hemoglobin A1C (%)   Date Value   07/19/2017 5.7   11/15/2016 5.9     LDL Cholesterol Calculated (mg/dL)   Date Value   07/10/2017 164 (H)   12/24/2015 171 (H)       Amount of exercise or physical activity: None    Problems taking medications regularly: No    Medication side effects: none    Diet: regular (no restrictions)    Stopped Metformin about a month ago      Hyperlipidemia Follow-Up      Rate your low fat/cholesterol diet?: fair    Taking statin?  Yes intermittently, no muscle aches from statin    Other lipid medications/supplements?:  None    Hs started taking Turmeric       Problem list and histories reviewed & adjusted, as indicated.  Additional history: as documented    Patient Active Problem List   Diagnosis     External hemorrhoids     Internal hemorrhoids with other complication     Paraseptal emphysema (H)     Pulmonary nodules     Bladder calculi     Prediabetes     BPH (benign prostatic hyperplasia)     Former smoker     Gastroesophageal reflux disease, esophagitis presence not specified     Past Surgical History:   Procedure Laterality Date     LIGATION OF HEMORRHOID(S)         Social History   Substance Use Topics     Smoking status: Former Smoker     Packs/day: 1.00     Types: Cigarettes     Start date: 1/1/1984     Quit date: 1/16/2016     Smokeless tobacco: Never Used     Alcohol use Yes     Family History   Problem Relation Age of Onset     DIABETES Mother          Current Outpatient Prescriptions   Medication Sig Dispense Refill     atorvastatin  (LIPITOR) 20 MG tablet Take 1 tablet (20 mg) by mouth daily (with dinner) 90 tablet 3     metFORMIN (GLUCOPHAGE-XR) 500 MG 24 hr tablet TAKE 1 TABLET BY MOUTH EVERY DAY WITH DINNER 90 tablet 1     omeprazole (PRILOSEC) 20 MG CR capsule TAKE ONE CAPSULE BY MOUTH EVERY DAY 90 capsule 1     tamsulosin (FLOMAX) 0.4 MG capsule TAKE 2 CAPSULES(0.8 MG) BY MOUTH AT BEDTIME 180 capsule 1     finasteride (PROSCAR) 5 MG tablet TAKE 1 TABLET BY MOUTH DAILY 30 tablet 0     omeprazole (PRILOSEC) 20 MG CR capsule Take 1 capsule (20 mg) by mouth daily 90 capsule 1     hydrocortisone (ANUSOL-HC) 25 MG Suppository Place 1 suppository (25 mg) rectally 2 times daily 28 suppository 0     triamcinolone (KENALOG) 0.1 % paste Take by mouth 2 times daily 5 g 1     No Known Allergies  Recent Labs   Lab Test  03/14/18   1912  07/19/17   1647  07/10/17   0838  11/15/16   1557   12/24/15   0815  12/26/13   1800   A1C  6.2*  5.7   --   5.9   < >   --    --    LDL  188*   --   164*   --    --   171*   --    HDL   --    --   43   --    --   47   --    TRIG   --    --   222*   --    --   134   --    ALT   --    --    --    --    --   36  36   CR  0.64*   --    --    --    --   0.77  0.73   GFRESTIMATED  >90   --    --    --    --   >90  Non  GFR Calc    >90   GFRESTBLACK  >90   --    --    --    --   >90   GFR Calc    >90   POTASSIUM  4.1   --    --    --    --   4.2  3.8    < > = values in this interval not displayed.      BP Readings from Last 3 Encounters:   03/14/18 125/78   01/27/18 124/67   09/27/17 120/74    Wt Readings from Last 3 Encounters:   03/14/18 140 lb (63.5 kg)   01/27/18 139 lb (63 kg)   09/27/17 138 lb 3.2 oz (62.7 kg)                    Reviewed and updated as needed this visit by clinical staff  Tobacco  Allergies  Meds       Reviewed and updated as needed this visit by Provider         ROS:  Constitutional, HEENT, cardiovascular, pulmonary, gi and gu systems are negative, except as otherwise  "noted.    OBJECTIVE:                                                    /78  Pulse 91  Temp 96.4  F (35.8  C) (Oral)  Ht 5' 4\" (1.626 m)  Wt 140 lb (63.5 kg)  SpO2 100%  BMI 24.03 kg/m2  Body mass index is 24.03 kg/(m^2).  GENERAL APPEARANCE: healthy, alert and no distress  EYES: Eyes grossly normal to inspection and conjunctivae and sclerae normal  NECK: no adenopathy and no asymmetry, masses, or scars  RESP: lungs clear to auscultation - no rales, rhonchi or wheezes  CV: regular rates and rhythm, normal S1 S2, no S3 or S4 and no murmur, click or rub  ABDOMEN: soft, non-tender  MS: extremities normal- no gross deformities noted  SKIN: no suspicious lesions or rashes  NEURO: Normal strength and tone, mentation intact and speech normal  PSYCH: mentation appears normal and affect normal/bright    Diagnostic test results:  Diagnostic Test Results:  Results for orders placed or performed in visit on 03/14/18   Basic metabolic panel   Result Value Ref Range    Sodium 139 133 - 144 mmol/L    Potassium 4.1 3.4 - 5.3 mmol/L    Chloride 102 94 - 109 mmol/L    Carbon Dioxide 27 20 - 32 mmol/L    Anion Gap 10 3 - 14 mmol/L    Glucose 118 (H) 70 - 99 mg/dL    Urea Nitrogen 19 7 - 30 mg/dL    Creatinine 0.64 (L) 0.66 - 1.25 mg/dL    GFR Estimate >90 >60 mL/min/1.7m2    GFR Estimate If Black >90 >60 mL/min/1.7m2    Calcium 8.9 8.5 - 10.1 mg/dL   Hemoglobin A1c   Result Value Ref Range    Hemoglobin A1C 6.2 (H) 4.3 - 6.0 %   LDL cholesterol direct   Result Value Ref Range    LDL Cholesterol Direct 188 (H) <100 mg/dL        ASSESSMENT/PLAN:                                                    1. Prediabetes  -HbA1C has increased to 6.2 from 5.7  -Goal of 150 minutes of moderate physical activity weekly   - Basic metabolic panel  - Hemoglobin A1c  - LDL cholesterol direct  - metFORMIN (GLUCOPHAGE-XR) 500 MG 24 hr tablet; TAKE 1 TABLET BY MOUTH EVERY DAY WITH DINNER  Dispense: 90 tablet; Refill: 1    2. Hyperlipidemia LDL " goal <130  -LDL high at 188, increased from 164  - Basic metabolic panel  - Hemoglobin A1c  - LDL cholesterol direct  - atorvastatin (LIPITOR) 20 MG tablet; Take 1 tablet (20 mg) by mouth daily (with dinner)  Dispense: 90 tablet; Refill: 3    The 10-year ASCVD risk score (Kenaidiandra TOBIAS Jr, et al., 2013) is: 5.4%    Values used to calculate the score:      Age: 48 years      Sex: Male      Is Non- : No      Diabetic: No      Tobacco smoker: No      Systolic Blood Pressure: 125 mmHg      Is BP treated: No      HDL Cholesterol: 43 mg/dL      Total Cholesterol: 251 mg/dL        Follow up with Provider - 6 months for fasting labs      Purvi Chakraborty MD MPH    [facility]

## 2018-03-14 NOTE — PATIENT INSTRUCTIONS
At [facility], we strive to deliver an exceptional experience to you, every time we see you.  If you receive a survey in the mail, please send us back your thoughts. We really do value your feedback.    Based on your medical history, these are the current health maintenance/preventive care services that you are due for (some may have been done at this visit.)  There are no preventive care reminders to display for this patient.      Suggested websites for health information:  Www.[place].org : Up to date and easily searchable information on multiple topics.  Www.medlineplus.gov : medication info, interactive tutorials, watch real surgeries online  Www.familydoctor.org : good info from the Academy of Family Physicians  Www.cdc.gov : public health info, travel advisories, epidemics (H1N1)  Www.aap.org : children's health info, normal development, vaccinations  Www.health.[facility].[place].us : [place] dept of health, public health issues in [place], N1N1    Your care team:                            Family Medicine Internal Medicine   MD Landon Keith MD Shantel Branch-Fleming, MD Katya Georgiev PA-C Megan Hill, APRN CNP    Tyler Hollins MD Pediatrics   David Muñiz, PAPABLIOT Tellez, CNP Ramonita Isaac APRN CNP   MD Wanda Peters MD Deborah Mielke, MD Kim Thein, APRN CNP      Clinic hours: Monday - Thursday 7 am-7 pm; Fridays 7 am-5 pm.   Urgent care: Monday - Friday 11 am-9 pm; Saturday and Sunday 9 am-5 pm.  Pharmacy : Monday -Thursday 8 am-8 pm; Friday 8 am-6 pm; Saturday and Sunday 9 am-5 pm.     Clinic: [phone]   Pharmacy: [phone]

## 2018-03-14 NOTE — TELEPHONE ENCOUNTER
We have to send to provider to say start prior authorization then we sent to prior authorization pool.  Mikey CAMARA

## 2018-03-14 NOTE — TELEPHONE ENCOUNTER
I am not clear on what I need to do about this. Does this not go through Cover my meds or do I need to initiate the process?  Thanks,  Purvi Chakraborty MD MPH

## 2018-03-14 NOTE — TELEPHONE ENCOUNTER
Talia is sending a PA request to be completed through covermymeds.com for triamcinolone (KENALOG) 0.1 % paste    Key:  [id number]

## 2018-03-15 LAB
ANION GAP SERPL CALCULATED.3IONS-SCNC: 10 MMOL/L (ref 3–14)
BUN SERPL-MCNC: 19 MG/DL (ref 7–30)
CALCIUM SERPL-MCNC: 8.9 MG/DL (ref 8.5–10.1)
CHLORIDE SERPL-SCNC: 102 MMOL/L (ref 94–109)
CO2 SERPL-SCNC: 27 MMOL/L (ref 20–32)
CREAT SERPL-MCNC: 0.64 MG/DL (ref 0.66–1.25)
GFR SERPL CREATININE-BSD FRML MDRD: >90 ML/MIN/1.7M2
GLUCOSE SERPL-MCNC: 118 MG/DL (ref 70–99)
LDLC SERPL DIRECT ASSAY-MCNC: 188 MG/DL
POTASSIUM SERPL-SCNC: 4.1 MMOL/L (ref 3.4–5.3)
SODIUM SERPL-SCNC: 139 MMOL/L (ref 133–144)

## 2018-03-16 RX ORDER — ATORVASTATIN CALCIUM 20 MG/1
20 TABLET, FILM COATED ORAL
Qty: 90 TABLET | Refills: 3 | Status: SHIPPED | OUTPATIENT
Start: 2018-03-16 | End: 2019-12-31

## 2018-03-16 RX ORDER — METFORMIN HCL 500 MG
TABLET, EXTENDED RELEASE 24 HR ORAL
Qty: 90 TABLET | Refills: 1 | Status: SHIPPED | OUTPATIENT
Start: 2018-03-16 | End: 2019-12-31

## 2018-03-17 NOTE — PROGRESS NOTES
Please CALL patient:    Dear Martin Salvador,    LDL cholesterol and three month glucose value (for pre diabetes) have worsened. I have sent in refills on both the cholesterol medication (Atorvastatin) and Pre diabetes medication (Metformin). We should recheck fasting labs in 6 months.     Regards,    Purvi Chakraborty MD MPH

## 2018-03-19 ENCOUNTER — TELEPHONE (OUTPATIENT)
Dept: FAMILY MEDICINE | Facility: CLINIC | Age: 48
End: 2018-03-19

## 2018-03-19 NOTE — LETTER
Martin HORTON Madeleine  [address] [facility] [place] [address]          March 19, 2018      Dear Mr. Salvador,    I am writing to inform you of the results of the laboratory tests you had done recently.    The results of your recent labs are as noted.     Exam Information   Exam Date Exam Time Accession # Results    3/14/18  7:12 PM [id number]    Component Results   Component Value Flag Ref Range Units Status Collected Lab   LDL Cholesterol Direct 188 (H) <100 mg/dL Final 03/14/2018  7:12 PM MG   Comment:   Above desirable:  100-129 mg/dl   Borderline High:  130-159 mg/dL   High:             160-189 mg/dL   Very high:       >189 mg/dl        Exam Information   Exam Date Exam Time Accession # Results    3/14/18  7:12 PM [id number]    Component Results   Component Value Flag Ref Range Units Status Collected Lab   Hemoglobin A1C 6.2 (H) 4.3 - 6.0 % Final 03/14/2018  7:12 PM BK       LDL cholesterol and three month glucose value (for pre diabetes) have worsened. I have sent in refills on both the cholesterol medication (Atorvastatin) and Pre diabetes medication (Metformin). We should recheck fasting labs in 6 months.     Regards,    Purvi Chakraborty MD MPH

## 2018-03-19 NOTE — TELEPHONE ENCOUNTER
Notes Recorded by Purvi Chakraborty MD on 3/16/2018 at 9:54 PM  Please CALL patient:    Dear Martin Salvador,    LDL cholesterol and three month glucose value (for pre diabetes) have worsened. I have sent in refills on both the cholesterol medication (Atorvastatin) and Pre diabetes medication (Metformin). We should recheck fasting labs in 6 months.     Regards,    Purvi Chakraborty MD MPH          Ref Range & Units 5d ago       LDL Cholesterol Direct <100 mg/dL 188 (H)     Comments: Above desirable:  100-129 mg/dl   Borderline High:  130-159 mg/dL   High:             160-189 mg/dL   Very high:       >189 mg/dl        Resulting Agency  [facility]            Updated patient on lab results. He also wanted results mailed to his home so he had a visual of lab ranges. Mailed results letter to patient as well. We also discussed lifestyle changes that can be made for patient to try and decrease cholesterol levels and A1C.    Toño Saldaña RN, BSN

## 2018-03-19 NOTE — TELEPHONE ENCOUNTER
PA Initiation    Medication: triamcinolone (KENALOG) 0.1 % paste - intiated   Insurance Company: [employer] - Phone [phone] Fax [phone]    Start Date: 3/19/2018

## 2018-03-20 NOTE — TELEPHONE ENCOUNTER
Prior Authorization Approval    Authorization Effective Date: 2/17/2018  Authorization Expiration Date: 3/19/2019  Medication: triamcinolone (KENALOG) 0.1 % paste - approved   Insurance Company: [employer] - Phone [phone] Fax [phone]  Which Pharmacy is filling the prescription (Not needed for infusion/clinic administered): [facility] [address] [place] [address] [address] [place] [address] AT [facility] [place]  Pharmacy Notified: Yes

## 2018-03-31 DIAGNOSIS — N40.1 BENIGN NODULAR PROSTATIC HYPERPLASIA WITH LOWER URINARY TRACT SYMPTOMS: ICD-10-CM

## 2018-03-31 NOTE — TELEPHONE ENCOUNTER
"Requested Prescriptions   Pending Prescriptions Disp Refills     finasteride (PROSCAR) 5 MG tablet [Pharmacy Med Name: FINASTERIDE 5MG TABLETS]    Last Written Prescription Date:  1/3/18  Last Fill Quantity: 30,  # refills: 0   Last Office Visit with [facility], [facility] or [facility] prescribing provider:  3/14/18   Future Office Visit:      30 tablet 0     Sig: TAKE 1 TABLET BY MOUTH DAILY    Alpha Blockers Passed    3/31/2018 11:37 AM       Passed - Blood pressure under 140/90 in past 12 months    BP Readings from Last 3 Encounters:   03/14/18 125/78   01/27/18 124/67   09/27/17 120/74                Passed - Recent (12 mo) or future (30 days) visit within the authorizing provider's specialty    Patient had office visit in the last 12 months or has a visit in the next 30 days with authorizing provider or within the authorizing provider's specialty.  See \"Patient Info\" tab in inbasket, or \"Choose Columns\" in Meds & Orders section of the refill encounter.           Passed - Patient does not have Tadalafil, Vardenafil, or Sildenafil on their medication list       Passed - Patient is 18 years of age or older              Cory Faarax  [facility]  "

## 2018-04-03 RX ORDER — FINASTERIDE 5 MG/1
TABLET, FILM COATED ORAL
Qty: 30 TABLET | Refills: 4 | Status: SHIPPED | OUTPATIENT
Start: 2018-04-03 | End: 2019-12-31

## 2018-05-03 ENCOUNTER — OFFICE VISIT (OUTPATIENT)
Dept: FAMILY MEDICINE | Facility: CLINIC | Age: 48
End: 2018-05-03
Payer: COMMERCIAL

## 2018-05-03 VITALS
HEIGHT: 64 IN | HEART RATE: 91 BPM | DIASTOLIC BLOOD PRESSURE: 71 MMHG | RESPIRATION RATE: 20 BRPM | BODY MASS INDEX: 24.41 KG/M2 | SYSTOLIC BLOOD PRESSURE: 119 MMHG | TEMPERATURE: 98.6 F | OXYGEN SATURATION: 96 % | WEIGHT: 143 LBS

## 2018-05-03 DIAGNOSIS — M79.18 GLUTEAL PAIN: Primary | ICD-10-CM

## 2018-05-03 DIAGNOSIS — Z12.11 SCREEN FOR COLON CANCER: ICD-10-CM

## 2018-05-03 PROCEDURE — 99214 OFFICE O/P EST MOD 30 MIN: CPT | Performed by: FAMILY MEDICINE

## 2018-05-03 RX ORDER — NAPROXEN 500 MG/1
500 TABLET ORAL 2 TIMES DAILY WITH MEALS
Qty: 60 TABLET | Refills: 0 | Status: SHIPPED | OUTPATIENT
Start: 2018-05-03 | End: 2018-05-26

## 2018-05-03 ASSESSMENT — PAIN SCALES - GENERAL: PAINLEVEL: NO PAIN (0)

## 2018-05-03 NOTE — PATIENT INSTRUCTIONS
At [facility], we strive to deliver an exceptional experience to you, every time we see you.  If you receive a survey in the mail, please send us back your thoughts. We really do value your feedback.    Based on your medical history, these are the current health maintenance/preventive care services that you are due for (some may have been done at this visit.)  Health Maintenance Due   Topic Date Due     HIV SCREEN (SYSTEM ASSIGNED)  03/16/1986     COLON CANCER SCREEN (SYSTEM ASSIGNED)  03/16/2018       Suggested websites for health information:  Www.[email or web address].org : Up to date and easily searchable information on multiple topics.  Www.Proformative.gov : medication info, interactive tutorials, watch real surgeries online  Www.familydoctor.org : good info from the Academy of Family Physicians  Www.cdc.gov : public health info, travel advisories, epidemics (H1N1)  Www.aap.org : children's health info, normal development, vaccinations  Www.health.[facility].[place].us : [place] dept of health, public health issues in [place], N1N1    Your care team:                            Family Medicine Internal Medicine   MD Landon Keith MD Shantel Branch-Fleming, MD Katya Georgiev PA-C Megan Hill, APRN CNP    Tyler Hollins MD Pediatrics   David Muñiz, PAPABLITO Tellez, MD Ramonita Arroyo APRN CNP   MD Wanda Peters MD Deborah Mielke, MD Kim Thein, APRN [facility]      Clinic hours: Monday - Thursday 7 am-7 pm; Fridays 7 am-5 pm.   Urgent care: Monday - Friday 11 am-9 pm; Saturday and Sunday 9 am-5 pm.  Pharmacy : Monday -Thursday 8 am-8 pm; Friday 8 am-6 pm; Saturday and Sunday 9 am-5 pm.     Clinic: [phone]   Pharmacy: [phone]      Colorectal Cancer Screening    Colorectal cancer is cancer in the colon or rectum. It is a leading cause of cancer deaths in the [place] But when this cancer is found and removed early, the chances of a full recovery are very good.  Because colorectal cancer rarely causes symptoms in its early stages, screening for the disease is important. It s even more crucial if you have risk factors for the disease. Learn more about colorectal cancer and its risk factors. Then talk to your healthcare provider about being screened.  Risk factors for colorectal cancer  Your risk of having colorectal cancer increases if you:    Are 50 years of age or older    Have a family history or personal history of colorectal cancer or polyps    Have a personal history of type 2 diabetes, Crohn s disease, or ulcerative colitis    Have an inherited genetic syndrome like Cole syndrome (HNPCC) or familial adenomatous polyposis (FAP)    Are very overweight    Are not physically active    Smoke    Drink a lot of alcohol    Eat a lot of red or processed meat  The colon and rectum  Waste from food you eat enters the colon from the small intestine. As it travels through the colon, the waste (stool) loses water and becomes more solid. Intestinal muscles push it toward the sigmoid colon. This is the last section of the colon. Stool then moves into the rectum, where it s stored until it s ready to leave the body during a bowel movement.  How colorectal cancer starts  Polyps are growths that form on the inner lining of the colon or rectum. Most are benign, which means they aren t cancer. But over time, some polyps can become cancer (malignant). This happens when cells in these polyps begin growing abnormally. In time, malignant cells invade more of the colon and rectum. The cancer may also spread to nearby organs or lymph nodes or to other parts of the body. Finding and removing polyps can help prevent cancer from forming.  Your screening  Screening means looking for a health problem before you have symptoms. During screening for colorectal cancer, your healthcare provider will ask about your health history, examine you, and do 1 or more tests. To start, you may have:    Health  history questions to answer. Your healthcare provider will ask about your health history. Mention if a family member has had colon cancer or polyps. Also mention any health problems you have had in the past.    Digital rectal exam (NIKKIE). During a NIKKIE, the healthcare provider inserts a lubricated gloved finger into the rectum. The test is painless and takes less than a minute. This test alone is not enough to screen for colorectal cancer. You will also need one of the below tests.  Screening test choices  Fecal occult blood test (FOBT) or fecal immunochemical test (FIT)  These tests check for blood in stool that you can t see (hidden or occult blood). Hidden blood may be a sign of colon polyps or cancer. A small sample of stool is tested for blood in a laboratory. Most often, you collect this sample at home using a kit your healthcare provider gives you. Follow the instructions carefully for using this kit. You might need to not eat certain foods and not take certain medicines before the test, as directed.  Stool DNA test  This test looks for DNA changes in cells in the stool. These DNA changes might be signs of cancer. It also looks for hidden blood in stool. For this test, you collect an entire bowel movement. This is done using a special container put in the toilet. The sample is then sent to a lab for testing.  Barium enema with contrast (double-contrast barium enema)  This test uses X-rays to create images of the entire colon and rectum. The day before this test, you will need to do a bowel prep to clean out the colon and rectum. A bowel prep is a liquid diet plus strong laxatives or enemas. You will be awake for the test, but you may be given medicine to help you relax. At the start of the test, a healthcare provider who specializes in imaging tests (radiologist) places a soft tube into the rectum. The tube is used to fill the colon with a contrast liquid (barium) and air. This can be uncomfortable for some  people. The liquid helps the colon show up clearly on the X-rays. Because the test uses X-rays, it exposes you to a small amount of radiation.  Virtual colonoscopy  This exam is also called a CT colonography. It uses a series of X-ray photographs to create a 3-D view of the colon and rectum. The day before the test, you will need to do a bowel prep to clean out your colon. Your healthcare provider will give you instructions on how to do this. During the procedure, you will lie on a table that is part of a special X-ray machine called a CT scanner. A small tube will be placed into your rectum to fill the colon and rectum with air. This can be uncomfortable for some people. Then, the table will move into the machine and pictures will be taken of your colon and rectum. A computer will combine these photos to create a 3-D picture. Because the test uses X-rays, it exposes you to a small amount of radiation.  Scope exams  Here are 2 types of scope exams:    Colonoscopy. This test can be used to find and remove polyps anywhere in the colon or rectum. The day before the test, you will do a bowel prep. This is a liquid diet plus a strong laxative solution or an enema. The bowel prep will cleanse your colon. You will be given instructions for this. Just before the test, you are given a medicine to make you sleepy. Then, a long, flexible, lighted tube called a colonoscope is gently inserted into the rectum and guided through the entire colon. Images of the colon are viewed on a video screen. Any polyps that are found are removed and sent to a lab for testing. If a polyp can t be removed, a sample of tissue is taken and the polyp might be removed later during surgery. You will need to bring someone with you to drive you home after this test. Colonoscopy is the only screening test that lets your healthcare provider see the entire colon and rectum. This test also lets your healthcare provider remove any pieces of tissue that need  to be looked at by a lab. If something suspicious is found using any other tests, you will likely need a colonoscopy.    Sigmoidoscopy. This test is similar to colonoscopy, but focuses only on the sigmoid colon and rectum. As with colonoscopy, bowel prep must be done the day before this test. It might not need to be as complete as the bowel prep for a colonoscopy. You are awake during the procedure, but you may be given medicine to help you relax. During the test, the healthcare provider guides a thin, flexible, lighted tube called a sigmoidoscope through your rectum and lower colon. The images are displayed on a video screen. Polyps are removed, if possible, and sent to a lab for testing.     When to call your healthcare provider after a test  Call your healthcare provider if you have any of the following after any screening test:    Bleeding    Fever of 100.4 F (38 C) or higher, or as directed by your healthcare provider    Abdominal pain    Vomiting   Date Last Reviewed: 12/1/2017 2000-2017 [employer] (?????). [address] [place] [address], [place] [address]. All rights reserved. This information is not intended as a substitute for professional medical care. Always follow your healthcare professional's instructions.

## 2018-05-03 NOTE — MR AVS SNAPSHOT
After Visit Summary   5/3/2018    Martin Salvador    MRN: [id number]           Patient Information     Date Of Birth          1970        Visit Information        Provider Department      5/3/2018 3:40 PM Blas Arellano MD [facility]        Today's Diagnoses     Gluteal pain    -  1    Screen for colon cancer          Care Instructions    At [facility], we strive to deliver an exceptional experience to you, every time we see you.  If you receive a survey in the mail, please send us back your thoughts. We really do value your feedback.    Based on your medical history, these are the current health maintenance/preventive care services that you are due for (some may have been done at this visit.)  Health Maintenance Due   Topic Date Due     HIV SCREEN (SYSTEM ASSIGNED)  03/16/1986     COLON CANCER SCREEN (SYSTEM ASSIGNED)  03/16/2018       Suggested websites for health information:  Www.Squabbler : Up to date and easily searchable information on multiple topics.  Www.medlineplus.gov : medication info, interactive tutorials, watch real surgeries online  Www.familydoctor.org : good info from the Academy of Family Physicians  Www.cdc.gov : public health info, travel advisories, epidemics (H1N1)  Www.aap.org : children's health info, normal development, vaccinations  Www.health.state.[place].us : [place] dept of health, public health issues in [place], N1N1    Your care team:                            Family Medicine Internal Medicine   MD Landon Keith MD Shantel Branch-Fleming, MD Katya Georgiev PA-C Megan Hill, TONI Hollins MD Pediatrics   MARK Posey CNP Paula Brito, MD Amelia Massimini APRN CNP   MD Wanda Peters MD Deborah Mielke, MD Kim Thein, APRN [facility]      Clinic hours: Monday - Thursday 7 am-7 pm; Fridays 7 am-5 pm.   Urgent care: Monday - Friday 11 am-9 pm; Saturday and Sunday 9 am-5  pm.  Pharmacy : Monday -Thursday 8 am-8 pm; Friday 8 am-6 pm; Saturday and Sunday 9 am-5 pm.     Clinic: [phone]   Pharmacy: [phone]      Colorectal Cancer Screening    Colorectal cancer is cancer in the colon or rectum. It is a leading cause of cancer deaths in the [place] But when this cancer is found and removed early, the chances of a full recovery are very good. Because colorectal cancer rarely causes symptoms in its early stages, screening for the disease is important. It s even more crucial if you have risk factors for the disease. Learn more about colorectal cancer and its risk factors. Then talk to your healthcare provider about being screened.  Risk factors for colorectal cancer  Your risk of having colorectal cancer increases if you:    Are 50 years of age or older    Have a family history or personal history of colorectal cancer or polyps    Have a personal history of type 2 diabetes, Crohn s disease, or ulcerative colitis    Have an inherited genetic syndrome like Cole syndrome (HNPCC) or familial adenomatous polyposis (FAP)    Are very overweight    Are not physically active    Smoke    Drink a lot of alcohol    Eat a lot of red or processed meat  The colon and rectum  Waste from food you eat enters the colon from the small intestine. As it travels through the colon, the waste (stool) loses water and becomes more solid. Intestinal muscles push it toward the sigmoid colon. This is the last section of the colon. Stool then moves into the rectum, where it s stored until it s ready to leave the body during a bowel movement.  How colorectal cancer starts  Polyps are growths that form on the inner lining of the colon or rectum. Most are benign, which means they aren t cancer. But over time, some polyps can become cancer (malignant). This happens when cells in these polyps begin growing abnormally. In time, malignant cells invade more of the colon and rectum. The cancer may also spread to nearby  organs or lymph nodes or to other parts of the body. Finding and removing polyps can help prevent cancer from forming.  Your screening  Screening means looking for a health problem before you have symptoms. During screening for colorectal cancer, your healthcare provider will ask about your health history, examine you, and do 1 or more tests. To start, you may have:    Health history questions to answer. Your healthcare provider will ask about your health history. Mention if a family member has had colon cancer or polyps. Also mention any health problems you have had in the past.    Digital rectal exam (NIKKIE). During a NIKKIE, the healthcare provider inserts a lubricated gloved finger into the rectum. The test is painless and takes less than a minute. This test alone is not enough to screen for colorectal cancer. You will also need one of the below tests.  Screening test choices  Fecal occult blood test (FOBT) or fecal immunochemical test (FIT)  These tests check for blood in stool that you can t see (hidden or occult blood). Hidden blood may be a sign of colon polyps or cancer. A small sample of stool is tested for blood in a laboratory. Most often, you collect this sample at home using a kit your healthcare provider gives you. Follow the instructions carefully for using this kit. You might need to not eat certain foods and not take certain medicines before the test, as directed.  Stool DNA test  This test looks for DNA changes in cells in the stool. These DNA changes might be signs of cancer. It also looks for hidden blood in stool. For this test, you collect an entire bowel movement. This is done using a special container put in the toilet. The sample is then sent to a lab for testing.  Barium enema with contrast (double-contrast barium enema)  This test uses X-rays to create images of the entire colon and rectum. The day before this test, you will need to do a bowel prep to clean out the colon and rectum. A bowel  prep is a liquid diet plus strong laxatives or enemas. You will be awake for the test, but you may be given medicine to help you relax. At the start of the test, a healthcare provider who specializes in imaging tests (radiologist) places a soft tube into the rectum. The tube is used to fill the colon with a contrast liquid (barium) and air. This can be uncomfortable for some people. The liquid helps the colon show up clearly on the X-rays. Because the test uses X-rays, it exposes you to a small amount of radiation.  Virtual colonoscopy  This exam is also called a CT colonography. It uses a series of X-ray photographs to create a 3-D view of the colon and rectum. The day before the test, you will need to do a bowel prep to clean out your colon. Your healthcare provider will give you instructions on how to do this. During the procedure, you will lie on a table that is part of a special X-ray machine called a CT scanner. A small tube will be placed into your rectum to fill the colon and rectum with air. This can be uncomfortable for some people. Then, the table will move into the machine and pictures will be taken of your colon and rectum. A computer will combine these photos to create a 3-D picture. Because the test uses X-rays, it exposes you to a small amount of radiation.  Scope exams  Here are 2 types of scope exams:    Colonoscopy. This test can be used to find and remove polyps anywhere in the colon or rectum. The day before the test, you will do a bowel prep. This is a liquid diet plus a strong laxative solution or an enema. The bowel prep will cleanse your colon. You will be given instructions for this. Just before the test, you are given a medicine to make you sleepy. Then, a long, flexible, lighted tube called a colonoscope is gently inserted into the rectum and guided through the entire colon. Images of the colon are viewed on a video screen. Any polyps that are found are removed and sent to a lab for  testing. If a polyp can t be removed, a sample of tissue is taken and the polyp might be removed later during surgery. You will need to bring someone with you to drive you home after this test. Colonoscopy is the only screening test that lets your healthcare provider see the entire colon and rectum. This test also lets your healthcare provider remove any pieces of tissue that need to be looked at by a lab. If something suspicious is found using any other tests, you will likely need a colonoscopy.    Sigmoidoscopy. This test is similar to colonoscopy, but focuses only on the sigmoid colon and rectum. As with colonoscopy, bowel prep must be done the day before this test. It might not need to be as complete as the bowel prep for a colonoscopy. You are awake during the procedure, but you may be given medicine to help you relax. During the test, the healthcare provider guides a thin, flexible, lighted tube called a sigmoidoscope through your rectum and lower colon. The images are displayed on a video screen. Polyps are removed, if possible, and sent to a lab for testing.     When to call your healthcare provider after a test  Call your healthcare provider if you have any of the following after any screening test:    Bleeding    Fever of 100.4 F (38 C) or higher, or as directed by your healthcare provider    Abdominal pain    Vomiting   Date Last Reviewed: 12/1/2017 2000-2017 [employer]. [address] [place] [address], [place] [address]. All rights reserved. This information is not intended as a substitute for professional medical care. Always follow your healthcare professional's instructions.                Follow-ups after your visit        Additional Services     GASTROENTEROLOGY ADULT REF PROCEDURE ONLY [facility] [phone]; [facility]       Last Lab Result: Creatinine (mg/dL)       Date                     Value                 03/14/2018               0.64 (L)          ----------  Body mass index is 24.55 kg/(m^2).     Needed:  No  Language:  English    Patient will be contacted to schedule the colonoscopy, or call the Specialty Scheduling Line [phone].      Please be aware that coverage of these services is subject to the terms and limitations of your health insurance plan.  Call member services at your health plan with any benefit or coverage questions.  Any procedures must be performed at a [place] facility OR coordinated by your clinic's referral office.    Please bring the following with you to your appointment:    (1) Any X-Rays, CTs or MRIs which have been performed.  Contact the facility where they were done to arrange for  prior to your scheduled appointment.    (2) List of current medications   (3) This referral request   (4) Any documents/labs given to you for this referral            [facility] PT, HAND, AND CHIROPRACTIC REFERRAL       **This order will print in the [facility] Scheduling Office**    Physical Therapy, Hand Therapy and Chiropractic Care are available through:    *[facility]  *[facility]  *[facility]    Call one number to schedule at any of the above locations: [phone].    (Go to [email or web address] for a list of locations with addresses and office hours.)    Your provider has referred you to: Physical Therapy at [facility] or [facility]    Indication/Reason for Referral: Left gluteal pain  Onset of Illness: May 1 2018  Therapy Orders: Evaluate and Treat  Special Programs: None  Special Request: Equipment: As Indicated:   Special Request: Modalities: As Indicated:     Additional Comments for the Therapist or Chiropractor:     Please be aware that coverage of these services is subject to the terms and limitations of your health insurance plan.  Call member services at your health plan with any benefit or coverage questions.      Please bring the following to your appointment:    *Your  "personal calendar for scheduling future appointments  *Comfortable clothing                  Follow-up notes from your care team     Return in about 3 weeks (around 2018) for recheck if symptoms fail to resolve by then.      Future tests that were ordered for you today     Open Future Orders        Priority Expected Expires Ordered    Fecal colorectal cancer screen FIT Routine  2019 5/3/2018            Who to contact     If you have questions or need follow up information about today's clinic visit or your schedule please contact [facility] directly at [phone].  Normal or non-critical lab and imaging results will be communicated to you by Citizen.VChart, letter or phone within 4 business days after the clinic has received the results. If you do not hear from us within 7 days, please contact the clinic through Exchanger[employer] or phone. If you have a critical or abnormal lab result, we will notify you by phone as soon as possible.  Submit refill requests through YellowHammer or call your pharmacy and they will forward the refill request to us. Please allow 3 business days for your refill to be completed.          Additional Information About Your Visit        YellowHammer Information     YellowHammer lets you send messages to your doctor, view your test results, renew your prescriptions, schedule appointments and more. To sign up, go to [email or web address] . Click on \"Log in\" on the left side of the screen, which will take you to the Welcome page. Then click on \"Sign up Now\" on the right side of the page.     You will be asked to enter the access code listed below, as well as some personal information. Please follow the directions to create your username and password.     Your access code is: [email or web address]  Expires: 2018  4:23 PM     Your access code will  in 90 days. If you need help or a new code, please call your [facility] or [phone].        Care EveryWhere ID     This is your Care " "EveryWhere ID. This could be used by other organizations to access your [place] medical records  [id number]        Your Vitals Were     Pulse Temperature Respirations Height Pulse Oximetry BMI (Body Mass Index)    91 98.6  F (37  C) (Oral) 20 5' 4\" (1.626 m) 96% 24.55 kg/m2       Blood Pressure from Last 3 Encounters:   05/03/18 119/71   03/14/18 125/78   01/27/18 124/67    Weight from Last 3 Encounters:   05/03/18 143 lb (64.9 kg)   03/14/18 140 lb (63.5 kg)   01/27/18 139 lb (63 kg)              We Performed the Following     GASTROENTEROLOGY ADULT REF PROCEDURE ONLY [facility] [phone]; [facility]     [facility] PT, HAND, AND CHIROPRACTIC REFERRAL          Today's Medication Changes          These changes are accurate as of 5/3/18  4:23 PM.  If you have any questions, ask your nurse or doctor.               Start taking these medicines.        Dose/Directions    naproxen 500 MG tablet   Commonly known as:  NAPROSYN   Used for:  Gluteal pain   Started by:  Blas Arellano MD        Dose:  500 mg   Take 1 tablet (500 mg) by mouth 2 times daily (with meals)   Quantity:  60 tablet   Refills:  0            Where to get your medicines      [id number] [address] [facility]  [address] [address]    Hours:  24-hours Phone:  [phone]     naproxen 500 MG tablet                Primary Care Provider Office Phone # Fax #    Purvi Chakraborty [phone] [address]  [facility] [address]        Equal Access to Services     [facility] JAVIER AH: Hadii alan arnold Sogino, waaxda luqadaha, qaybta kaalmada adeegyada, radha parr. So [facility] [phone].    ATENCIÓN: Si habla español, tiene a saenz disposición servicios gratuitos de asistencia lingüística. Llame al [phone].    We comply with applicable federal civil rights laws and " [place] laws. We do not discriminate on the basis of race, color, national origin, age, disability, sex, sexual orientation, or gender identity.            Thank you!     Thank you for choosing [facility]  for your care. Our goal is always to provide you with excellent care. Hearing back from our patients is one way we can continue to improve our services. Please take a few minutes to complete the written survey that you may receive in the mail after your visit with us. Thank you!             Your Updated Medication List - Protect others around you: Learn how to safely use, store and throw away your medicines at www.disposemymeds.org.          This list is accurate as of 5/3/18  4:23 PM.  Always use your most recent med list.                   Brand Name Dispense Instructions for use Diagnosis    atorvastatin 20 MG tablet    LIPITOR    90 tablet    Take 1 tablet (20 mg) by mouth daily (with dinner)    Hyperlipidemia LDL goal <130       finasteride 5 MG tablet    PROSCAR    30 tablet    TAKE 1 TABLET BY MOUTH DAILY    Benign nodular prostatic hyperplasia with lower urinary tract symptoms       hydrocortisone 25 MG Suppository    ANUSOL-HC    28 suppository    Place 1 suppository (25 mg) rectally 2 times daily    Hemorrhoids, unspecified hemorrhoid type       metFORMIN 500 MG 24 hr tablet    GLUCOPHAGE-XR    90 tablet    TAKE 1 TABLET BY MOUTH EVERY DAY WITH DINNER    Prediabetes       naproxen 500 MG tablet    NAPROSYN    60 tablet    Take 1 tablet (500 mg) by mouth 2 times daily (with meals)    Gluteal pain       omeprazole 20 MG CR capsule    priLOSEC    90 capsule    TAKE ONE CAPSULE BY MOUTH EVERY DAY    Gastroesophageal reflux disease, esophagitis presence not specified       tamsulosin 0.4 MG capsule    FLOMAX    180 capsule    TAKE 2 CAPSULES(0.8 MG) BY MOUTH AT BEDTIME    Benign nodular prostatic hyperplasia with lower urinary tract symptoms       triamcinolone 0.1 % paste    KENALOG     5 g    Take by mouth 2 times daily    Oral aphthous ulcer

## 2018-05-03 NOTE — PROGRESS NOTES
"  SUBJECTIVE:   Martin Salvador is a 48 year old male who presents to clinic today for the following health issues:      Joint Pain    Onset: a couple days ago    Description:   Location: Left side of butt  Character: Dull ache, feels muscular, hurts when walking but not when pressing on the area    Intensity: mild    Progression of Symptoms: better    Accompanying Signs & Symptoms:  Other symptoms: none    History:   Previous similar pain: no       Precipitating factors:   Trauma or overuse: no     Alleviating factors:  Improved by: walking slow    Therapies Tried and outcome: Naproxen helped to relieve 70% of his pain (med prescribed to his wife)      Past medical, family, and social histories, medications, and allergies are reviewed and updated in [facility].     ROS:  CONSTITUTIONAL: NEGATIVE for fever, chills, change in weight  ENT/MOUTH: NEGATIVE for ear, mouth and throat problems  RESP: NEGATIVE for significant cough or SOB  CV: NEGATIVE for chest pain, palpitations or peripheral edema  ROS otherwise negative    This document serves as a record of the services and decisions personally performed and made by Dr. Arellano. It was created on his behalf by Genevieve Daniels, a trained medical scribe. The creation of this document is based the provider's statements to the medical scribe.  Genevieve Daniels May 3, 2018 4:12 PM     OBJECTIVE:                                                    /71 (BP Location: Left arm, Patient Position: Chair, Cuff Size: Adult Regular)  Pulse 91  Temp 98.6  F (37  C) (Oral)  Resp 20  Ht 1.626 m (5' 4\")  Wt 64.9 kg (143 lb)  SpO2 96%  BMI 24.55 kg/m2 Body mass index is 24.55 kg/(m^2).     GENERAL: healthy, alert and no distress  EYES: Eyes grossly normal to inspection, PERRL, EOMI, sclerae white and conjunctivae normal  MS: no gross musculoskeletal defects noted, no edema. No reproduction of pain with deep palpation into the symptomatic area, which is basically at the left ischial " tuberosity. No reproduction of his symptoms with resisted hip flexion, extension, abduction, adduction. No tenderness with palpation of the coccyx.   SKIN: no suspicious lesions or rashes, including on the buttocks  NEURO: Normal strength and tone, sensory exam grossly normal, mentation intact, oriented times 3 and cranial nerves 2-12 intact  PSYCH: mentation appears normal, affect normal/bright     Diagnostic Test Results:  none     ASSESSMENT/PLAN:                                                      (M79.1) Gluteal pain  (primary encounter diagnosis)  Comment: Suspecting left gluteal tendonitis. Since the pain is not constant, and there is no bony tenderness, I doubt anything more serious such as osteomyelitis.   Plan: naproxen (NAPROSYN) 500 MG tablet, [facility] PT,         HAND, AND CHIROPRACTIC REFERRAL        Return in about 3 weeks (around 5/24/2018) for recheck if symptoms fail to resolve by then.     (Z12.11) Screen for colon cancer  Comment:   Plan: [facility]        [facility] [phone]; [facility]         Surgery, Fecal colorectal cancer screen FIT        Handouts provided.      The information in this document, created by the medical scribe for me, accurately reflects the services I personally performed and the decisions made by me. I have reviewed and approved this document for accuracy prior to leaving the patient care area. May 3, 2018 4:12 PM     Blas Arellano MD

## 2018-05-04 DIAGNOSIS — R73.03 PREDIABETES: ICD-10-CM

## 2018-05-04 NOTE — TELEPHONE ENCOUNTER
metFORMIN (GLUCOPHAGE-XR) 500 MG 24 hr tablet 3/16/18 with 90 tablets and 1 refill.          Cory Faarax  [facility]

## 2018-05-08 RX ORDER — METFORMIN HCL 500 MG
TABLET, EXTENDED RELEASE 24 HR ORAL
Qty: 90 TABLET | Refills: 0 | OUTPATIENT
Start: 2018-05-08

## 2018-05-26 DIAGNOSIS — M79.18 GLUTEAL PAIN: ICD-10-CM

## 2018-05-26 NOTE — TELEPHONE ENCOUNTER
"Requested Prescriptions   Pending Prescriptions Disp Refills     naproxen (NAPROSYN) 500 MG tablet [Pharmacy Med Name: NAPROXEN 500MG TABLETS]    Last Written Prescription Date:  5/3/18  Last Fill Quantity: 60,  # refills: 0   Last Office Visit with G[facility] or [facility] prescribing provider:  5/3/18   Future Office Visit:      60 tablet 0     Sig: TAKE 1 TABLET(500 MG) BY MOUTH TWICE DAILY WITH MEALS    NSAID Medications Failed    5/26/2018  3:25 PM       Failed - Normal ALT on file in past 12 months    Recent Labs   Lab Test  12/24/15   0815   ALT  36            Failed - Normal AST on file in past 12 months    Recent Labs   Lab Test  12/24/15   0815   AST  20            Failed - Normal CBC on file in past 12 months    Recent Labs   Lab Test  01/06/17   1704   WBC  8.9   RBC  4.67   HGB  14.2   HCT  43.3   PLT  241            Failed - Normal serum creatinine on file in past 12 months    Recent Labs   Lab Test  03/14/18   1912   CR  0.64*            Passed - Blood pressure under 140/90 in past 12 months    BP Readings from Last 3 Encounters:   05/03/18 119/71   03/14/18 125/78   01/27/18 124/67                Passed - Recent (12 mo) or future (30 days) visit within the authorizing provider's specialty    Patient had office visit in the last 12 months or has a visit in the next 30 days with authorizing provider or within the authorizing provider's specialty.  See \"Patient Info\" tab in inbasket, or \"Choose Columns\" in Meds & Orders section of the refill encounter.           Passed - Patient is age 6-64 years              Cory Faarax  [facility]  "

## 2018-05-29 NOTE — TELEPHONE ENCOUNTER
Routing refill request to provider for review/approval because:  Labs out of range:  creatinine  Labs not current:  ALT, AST, CBC    Toño Saldaña RN, BSN

## 2018-05-31 RX ORDER — NAPROXEN 500 MG/1
TABLET ORAL
Qty: 60 TABLET | Refills: 0 | Status: SHIPPED | OUTPATIENT
Start: 2018-05-31 | End: 2018-06-29

## 2018-06-29 DIAGNOSIS — M79.18 GLUTEAL PAIN: ICD-10-CM

## 2018-06-29 NOTE — TELEPHONE ENCOUNTER
"Requested Prescriptions   Pending Prescriptions Disp Refills     naproxen (NAPROSYN) 500 MG tablet [Pharmacy Med Name: NAPROXEN 500MG TABLETS]  Last Written Prescription Date:  05/31/18  Last Fill Quantity: 60,  # refills: 0   Last Office Visit with G, KHALIF or [facility] prescribing provider:  05/03/18   Future Office Visit:    60 tablet 0     Sig: TAKE 1 TABLET(500 MG) BY MOUTH TWICE DAILY WITH MEALS    NSAID Medications Failed    6/29/2018 11:31 AM       Failed - Normal ALT on file in past 12 months    Recent Labs   Lab Test  12/24/15   0815   ALT  36            Failed - Normal AST on file in past 12 months    Recent Labs   Lab Test  12/24/15   0815   AST  20            Failed - Normal CBC on file in past 12 months    Recent Labs   Lab Test  01/06/17   1704   WBC  8.9   RBC  4.67   HGB  14.2   HCT  43.3   PLT  241       For [facility] ONLY: [id number] = WBC, ASPB861 = RBC         Failed - Normal serum creatinine on file in past 12 months    Recent Labs   Lab Test  03/14/18   1912   CR  0.64*            Passed - Blood pressure under 140/90 in past 12 months    BP Readings from Last 3 Encounters:   05/03/18 119/71   03/14/18 125/78   01/27/18 124/67                Passed - Recent (12 mo) or future (30 days) visit within the authorizing provider's specialty    Patient had office visit in the last 12 months or has a visit in the next 30 days with authorizing provider or within the authorizing provider's specialty.  See \"Patient Info\" tab in inbasket, or \"Choose Columns\" in Meds & Orders section of the refill encounter.           Passed - Patient is age 6-64 years          "

## 2018-07-02 RX ORDER — NAPROXEN 500 MG/1
TABLET ORAL
Qty: 60 TABLET | Refills: 0 | Status: SHIPPED | OUTPATIENT
Start: 2018-07-02 | End: 2018-07-31

## 2018-07-02 NOTE — TELEPHONE ENCOUNTER
Routing refill request to provider for review/approval because:  Labs out of range:  creatinine  Labs not current:  ALT, AST, CBC  Benita Zaman RN

## 2018-07-07 DIAGNOSIS — Z12.11 SCREEN FOR COLON CANCER: ICD-10-CM

## 2018-07-07 PROCEDURE — 82274 ASSAY TEST FOR BLOOD FECAL: CPT | Performed by: FAMILY MEDICINE

## 2018-07-08 LAB — HEMOCCULT STL QL IA: NEGATIVE

## 2018-07-08 NOTE — PROGRESS NOTES
Dear Martin Salvador,    The lab results from the most recent visit are all normal or in a good range.     There was no blood in the stool. Recheck in 1 year.    Please call me if you have any questions about your condition or care.     Sincerely,    Kristan Prince MD

## 2018-07-10 NOTE — TELEPHONE ENCOUNTER
Patient called and informed of appointment due. He states doesn't need this medication yet. He agreed to schedule appointment.    Zain Zuniga CMA

## 2018-07-25 PROBLEM — E78.5 HYPERLIPIDEMIA LDL GOAL <100: Status: ACTIVE | Noted: 2018-07-25

## 2018-07-30 ENCOUNTER — OFFICE VISIT (OUTPATIENT)
Dept: FAMILY MEDICINE | Facility: CLINIC | Age: 48
End: 2018-07-30
Payer: MEDICAID

## 2018-07-30 VITALS
DIASTOLIC BLOOD PRESSURE: 77 MMHG | OXYGEN SATURATION: 97 % | SYSTOLIC BLOOD PRESSURE: 115 MMHG | WEIGHT: 141 LBS | BODY MASS INDEX: 24.07 KG/M2 | HEIGHT: 64 IN | TEMPERATURE: 98.3 F | RESPIRATION RATE: 18 BRPM | HEART RATE: 87 BPM

## 2018-07-30 DIAGNOSIS — R73.01 IMPAIRED FASTING GLUCOSE: ICD-10-CM

## 2018-07-30 DIAGNOSIS — Z11.4 SCREENING FOR HUMAN IMMUNODEFICIENCY VIRUS: ICD-10-CM

## 2018-07-30 DIAGNOSIS — E78.5 HYPERLIPIDEMIA LDL GOAL <100: ICD-10-CM

## 2018-07-30 DIAGNOSIS — R73.01 IMPAIRED FASTING GLUCOSE: Primary | ICD-10-CM

## 2018-07-30 LAB
ALT SERPL W P-5'-P-CCNC: 61 U/L (ref 0–70)
CHOLEST SERPL-MCNC: 161 MG/DL
GLUCOSE BLD-MCNC: 136 MG/DL (ref 70–99)
HBA1C MFR BLD: 6 % (ref 0–5.6)
HDLC SERPL-MCNC: 34 MG/DL
LDLC SERPL CALC-MCNC: 53 MG/DL
NONHDLC SERPL-MCNC: 127 MG/DL
TRIGL SERPL-MCNC: 369 MG/DL

## 2018-07-30 PROCEDURE — 82947 ASSAY GLUCOSE BLOOD QUANT: CPT | Performed by: FAMILY MEDICINE

## 2018-07-30 PROCEDURE — 80061 LIPID PANEL: CPT | Performed by: FAMILY MEDICINE

## 2018-07-30 PROCEDURE — 36415 COLL VENOUS BLD VENIPUNCTURE: CPT | Performed by: FAMILY MEDICINE

## 2018-07-30 PROCEDURE — 84460 ALANINE AMINO (ALT) (SGPT): CPT | Performed by: FAMILY MEDICINE

## 2018-07-30 PROCEDURE — 83036 HEMOGLOBIN GLYCOSYLATED A1C: CPT | Performed by: FAMILY MEDICINE

## 2018-07-30 PROCEDURE — 99213 OFFICE O/P EST LOW 20 MIN: CPT | Performed by: NURSE PRACTITIONER

## 2018-07-30 PROCEDURE — 87389 HIV-1 AG W/HIV-1&-2 AB AG IA: CPT | Performed by: FAMILY MEDICINE

## 2018-07-30 ASSESSMENT — PAIN SCALES - GENERAL: PAINLEVEL: NO PAIN (0)

## 2018-07-30 NOTE — MR AVS SNAPSHOT
After Visit Summary   7/30/2018    Martin Salvador    MRN: [id number]           Patient Information     Date Of Birth          1970        Visit Information        Provider Department      7/30/2018 3:40 PM Eleni Olson APRN CNP [facility]        Today's Diagnoses     Impaired fasting glucose    -  1    Hyperlipidemia LDL goal <100          Care Instructions    Recommend having labs done fasting for cholesterol and glucose given that you have a history of abnormal for both. Nothing to eat or drink for 8-10 hours before coming to lab. Please make lab only appointment. The other labs you had drawn today will be results by ordering provider.    At [facility], we strive to deliver an exceptional experience to you, every time we see you.  If you receive a survey in the mail, please send us back your thoughts. We really do value your feedback.    Based on your medical history, these are the current health maintenance/preventive care services that you are due for (some may have been done at this visit.)  Health Maintenance Due   Topic Date Due     SPIROMETRY ONETIME  1970     COPD ACTION PLAN Q1 YR  1970     PNEUMOVAX 1X HI RISK PATIENT < 65 (NO IB MSG)  03/16/1970     ADVANCE DIRECTIVE PLANNING Q5 YRS  03/16/1986     HIV SCREEN (SYSTEM ASSIGNED)  03/16/1986     COLON CANCER SCREEN (SYSTEM ASSIGNED)  03/16/2018         Suggested websites for health information:  Www.AlwaysFashion.Viki : Up to date and easily searchable information on multiple topics.  Www.medlineplus.gov : medication info, interactive tutorials, watch real surgeries online  Www.familydoctor.org : good info from the Academy of Family Physicians  Www.cdc.gov : public health info, travel advisories, epidemics (H1N1)  Www.aap.org : children's health info, normal development, vaccinations  Www.health.state.[place].us : [place] dept of health, public health issues in [place], N1N1    Your care team:                             Family Medicine Internal Medicine   MD Landon Keith MD Shantel Branch-Fleming, MD Katya Georgiev PA-C Nam Ho, MD Pediatrics   MARK Posey, MD Wanda Khan CNP, MD Deborah Mielke, MD Kim Thein, APRN [facility]      Clinic hours: Monday - Thursday 7 am-7 pm; Fridays 7 am-5 pm.   Urgent care: Monday - Friday 11 am-9 pm; Saturday and Sunday 9 am-5 pm.  Pharmacy : Monday -Thursday 8 am-8 pm; Friday 8 am-6 pm; Saturday and Sunday 9 am-5 pm.     Clinic: [phone]   Pharmacy: [phone]            Follow-ups after your visit        Future tests that were ordered for you today     Open Future Orders        Priority Expected Expires Ordered    Lipid panel reflex to direct LDL Fasting Routine 7/31/2018 8/31/2018 7/30/2018    Glucose Routine 7/31/2018 8/31/2018 7/30/2018            Who to contact     If you have questions or need follow up information about today's clinic visit or your schedule please contact [facility] directly at [phone].  Normal or non-critical lab and imaging results will be communicated to you by MyChart, letter or phone within 4 business days after the clinic has received the results. If you do not hear from us within 7 days, please contact the clinic through MyChart or phone. If you have a critical or abnormal lab result, we will notify you by phone as soon as possible.  Submit refill requests through [employer] or call your pharmacy and they will forward the refill request to us. Please allow 3 business days for your refill to be completed.          Additional Information About Your Visit        Care EveryWhere ID     This is your Care EveryWhere ID. This could be used by other organizations to access your [facility] medical records  [id number]        Your Vitals Were     Pulse Temperature Respirations Height Pulse Oximetry BMI (Body Mass Index)    87 98.3  F (36.8  " C) (Oral) 18 5' 4\" (1.626 m) 97% 24.2 kg/m2       Blood Pressure from Last 3 Encounters:   07/30/18 115/77   05/03/18 119/71   03/14/18 125/78    Weight from Last 3 Encounters:   07/30/18 141 lb (64 kg)   05/03/18 143 lb (64.9 kg)   03/14/18 140 lb (63.5 kg)               Primary Care Provider Office Phone # Fax #    Purvi Chakraborty [phone]       [address]  [facility] [address]        Equal Access to Services     [facility]: Hadii alan roldan hadasho Sogino, waaxda luqadaha, qaybta kaalmada adeegyada, radha saldaña . So [facility] [phone].    ATENCIÓN: Si habla español, tiene a saenz disposición servicios gratuitos de asistencia lingüística. [facility] [phone].    We comply with applicable federal civil rights laws and [place] laws. We do not discriminate on the basis of race, color, national origin, age, disability, sex, sexual orientation, or gender identity.            Thank you!     Thank you for choosing [facility]  for your care. Our goal is always to provide you with excellent care. Hearing back from our patients is one way we can continue to improve our services. Please take a few minutes to complete the written survey that you may receive in the mail after your visit with us. Thank you!             Your Updated Medication List - Protect others around you: Learn how to safely use, store and throw away your medicines at www.disposemymeds.org.          This list is accurate as of 7/30/18  4:37 PM.  Always use your most recent med list.                   Brand Name Dispense Instructions for use Diagnosis    atorvastatin 20 MG tablet    LIPITOR    90 tablet    Take 1 tablet (20 mg) by mouth daily (with dinner)    Hyperlipidemia LDL goal <130       finasteride 5 MG tablet    PROSCAR    30 tablet    TAKE 1 TABLET BY MOUTH DAILY    Benign nodular prostatic hyperplasia with lower urinary tract symptoms       hydrocortisone 25 MG " Suppository    ANUSOL-HC    28 suppository    Place 1 suppository (25 mg) rectally 2 times daily    Hemorrhoids, unspecified hemorrhoid type       metFORMIN 500 MG 24 hr tablet    GLUCOPHAGE-XR    90 tablet    TAKE 1 TABLET BY MOUTH EVERY DAY WITH DINNER    Prediabetes       naproxen 500 MG tablet    NAPROSYN    60 tablet    TAKE 1 TABLET(500 MG) BY MOUTH TWICE DAILY WITH MEALS    Gluteal pain       omeprazole 20 MG CR capsule    priLOSEC    90 capsule    TAKE ONE CAPSULE BY MOUTH EVERY DAY    Gastroesophageal reflux disease, esophagitis presence not specified       tamsulosin 0.4 MG capsule    FLOMAX    180 capsule    TAKE 2 CAPSULES(0.8 MG) BY MOUTH AT BEDTIME    Benign nodular prostatic hyperplasia with lower urinary tract symptoms       triamcinolone 0.1 % paste    KENALOG    5 g    Take by mouth 2 times daily    Oral aphthous ulcer

## 2018-07-30 NOTE — PROGRESS NOTES
SUBJECTIVE:   Martin Salvador is a 48 year old male who presents to clinic today for the following health issues:      Medication Followup of All    Taking Medication as prescribed: NO    Side Effects:  None    Medication Helping Symptoms:  yes       Pleasant 48 year old male presents for labs to be drawn which he did immediately before seeing me. He's not sure why he's here. He had labs drawn but not fasting. States someone called him and told him to have labs drawn. He has many questions about his daughter and son. No concerns for himself today per his report. He takes only lipitor and omeprazole.    Problem list and histories reviewed & adjusted, as indicated.  Additional history: as documented    Patient Active Problem List   Diagnosis     External hemorrhoids     Internal hemorrhoids with other complication     Paraseptal emphysema (H)     Pulmonary nodules     Bladder calculi     Impaired fasting glucose     BPH (benign prostatic hyperplasia)     Personal history of tobacco use, presenting hazards to health     Gastroesophageal reflux disease without esophagitis     Hyperlipidemia LDL goal <100     Past Surgical History:   Procedure Laterality Date     LIGATION OF HEMORRHOID(S)         Social History   Substance Use Topics     Smoking status: Former Smoker     Packs/day: 1.00     Types: Cigarettes     Start date: 1/1/1984     Quit date: 1/16/2016     Smokeless tobacco: Never Used     Alcohol use Yes     Family History   Problem Relation Age of Onset     Diabetes Mother          Current Outpatient Prescriptions   Medication Sig Dispense Refill     atorvastatin (LIPITOR) 20 MG tablet Take 1 tablet (20 mg) by mouth daily (with dinner) 90 tablet 3     omeprazole (PRILOSEC) 20 MG CR capsule TAKE ONE CAPSULE BY MOUTH EVERY DAY 90 capsule 1     finasteride (PROSCAR) 5 MG tablet TAKE 1 TABLET BY MOUTH DAILY (Patient not taking: Reported on 7/30/2018) 30 tablet 4     hydrocortisone (ANUSOL-HC) 25 MG Suppository Place 1  suppository (25 mg) rectally 2 times daily (Patient not taking: Reported on 7/30/2018) 28 suppository 0     metFORMIN (GLUCOPHAGE-XR) 500 MG 24 hr tablet TAKE 1 TABLET BY MOUTH EVERY DAY WITH DINNER (Patient not taking: Reported on 7/30/2018) 90 tablet 1     naproxen (NAPROSYN) 500 MG tablet TAKE 1 TABLET(500 MG) BY MOUTH TWICE DAILY WITH MEALS (Patient not taking: Reported on 7/30/2018) 60 tablet 0     tamsulosin (FLOMAX) 0.4 MG capsule TAKE 2 CAPSULES(0.8 MG) BY MOUTH AT BEDTIME (Patient not taking: Reported on 7/30/2018) 180 capsule 1     triamcinolone (KENALOG) 0.1 % paste Take by mouth 2 times daily (Patient not taking: Reported on 7/30/2018) 5 g 1     No Known Allergies  Recent Labs   Lab Test  07/30/18   1603  03/14/18   1912  07/19/17   1647  07/10/17   0838   12/24/15   0815   12/26/13   1800   A1C  6.0*  6.2*  5.7   --    < >   --    --    --    LDL  53  188*   --   164*   --   171*   < >   --    HDL  34*   --    --   43   --   47   --    --    TRIG  369*   --    --   222*   --   134   --    --    ALT  61   --    --    --    --   36   --   36   CR   --   0.64*   --    --    --   0.77   --   0.73   GFRESTIMATED   --   >90   --    --    --   >90  Non  GFR Calc     --   >90   GFRESTBLACK   --   >90   --    --    --   >90   GFR Calc     --   >90   POTASSIUM   --   4.1   --    --    --   4.2   --   3.8    < > = values in this interval not displayed.      BP Readings from Last 3 Encounters:   07/30/18 115/77   05/03/18 119/71   03/14/18 125/78    Wt Readings from Last 3 Encounters:   07/30/18 141 lb (64 kg)   05/03/18 143 lb (64.9 kg)   03/14/18 140 lb (63.5 kg)                  Labs reviewed in EPIC    Reviewed and updated as needed this visit by clinical staff  Tobacco  Allergies  Meds  Problems  Med Hx  Surg Hx  Fam Hx  Soc Hx        Reviewed and updated as needed this visit by Provider  Allergies  Meds  Problems         ROS:  Constitutional, HEENT, cardiovascular,  "pulmonary, GI, , musculoskeletal, neuro, skin, endocrine and psych systems are negative, except as otherwise noted.    OBJECTIVE:     /77 (BP Location: Right arm, Patient Position: Chair, Cuff Size: Adult Regular)  Pulse 87  Temp 98.3  F (36.8  C) (Oral)  Resp 18  Ht 5' 4\" (1.626 m)  Wt 141 lb (64 kg)  SpO2 97%  BMI 24.2 kg/m2  Body mass index is 24.2 kg/(m^2).  GENERAL: healthy, alert and no distress  NECK: no adenopathy, no asymmetry, masses, or scars and thyroid normal to palpation  RESP: lungs clear to auscultation - no rales, rhonchi or wheezes  CV: regular rate and rhythm, normal S1 S2, no S3 or S4, no murmur, click or rub, no peripheral edema and peripheral pulses strong  MS: no gross musculoskeletal defects noted, no edema  PSYCH: mentation appears normal, affect normal/bright    Diagnostic Test Results:  none   Results pending    ASSESSMENT/PLAN:         ICD-10-CM    1. Impaired fasting glucose R73.01 Glucose   2. Hyperlipidemia LDL goal <100 E78.5 Lipid panel reflex to direct LDL Fasting     Patient had labs drawn before seeing me. He doesn't know why he's here for office visit. I can't see any notes that say he is supposed to come in for follow up. Because he was not fasting for glucose and lipids, I reordered and asked him to come in at his convenience for lab only appointment after fasting for 8-10 hours. I advised follow up with primary care provider as they will result the labs when results available.     See Patient Instructions    Patient verbalizes understanding and agrees with plan of care. Patient stable for discharge.      TONI Banerjee [facility]  "

## 2018-07-31 DIAGNOSIS — E78.5 HYPERLIPIDEMIA LDL GOAL <130: ICD-10-CM

## 2018-07-31 DIAGNOSIS — M79.18 GLUTEAL PAIN: ICD-10-CM

## 2018-07-31 LAB — HIV 1+2 AB+HIV1 P24 AG SERPL QL IA: NONREACTIVE

## 2018-08-01 DIAGNOSIS — R73.01 IMPAIRED FASTING GLUCOSE: ICD-10-CM

## 2018-08-01 DIAGNOSIS — E78.5 HYPERLIPIDEMIA LDL GOAL <100: ICD-10-CM

## 2018-08-01 LAB
CHOLEST SERPL-MCNC: 190 MG/DL
GLUCOSE SERPL-MCNC: 104 MG/DL (ref 70–99)
HDLC SERPL-MCNC: 38 MG/DL
LDLC SERPL CALC-MCNC: 102 MG/DL
NONHDLC SERPL-MCNC: 152 MG/DL
TRIGL SERPL-MCNC: 249 MG/DL

## 2018-08-01 PROCEDURE — 82947 ASSAY GLUCOSE BLOOD QUANT: CPT | Performed by: NURSE PRACTITIONER

## 2018-08-01 PROCEDURE — 36415 COLL VENOUS BLD VENIPUNCTURE: CPT | Performed by: NURSE PRACTITIONER

## 2018-08-01 PROCEDURE — 80061 LIPID PANEL: CPT | Performed by: NURSE PRACTITIONER

## 2018-08-01 NOTE — PROGRESS NOTES
Please send letter:    Mr. Salvador,  Your lab results are attached - they show elevated triglycerides but your cholesterol is normal. Your glucose continues to be elevated while fasting. It is not diabetes yet, but it is diagnostic for pre-diabetes.  I recommend watching diet: eat lots of fruits/vegetables and lean meats. Avoid processed foods and added surgars. Get at least 30 minutes of exercise 4-5 days per week. Please let us know if you have any questions.  Thank you for allowing us to participate in your care.  TONI Banerjee CNP

## 2018-08-01 NOTE — LETTER
August 1, 2018      Martin Salvador  [address] [facility] [place] [address]        Mr. Salvador,    Your lab results are attached - they show elevated triglycerides but your cholesterol is normal. Your glucose continues to be elevated while fasting. It is not diabetes yet, but it is diagnostic for pre-diabetes.  I recommend watching diet: eat lots of fruits/vegetables and lean meats. Avoid processed foods and added surgars. Get at least 30 minutes of exercise 4-5 days per week.    Please let us know if you have any questions. Thank you for allowing us to participate in your care.    Sincerely,      Eleni Olson, TONI CNP/ymv    Resulted Orders   Lipid panel reflex to direct LDL Fasting   Result Value Ref Range    Cholesterol 190 <200 mg/dL    Triglycerides 249 (H) <150 mg/dL      Comment:      Borderline high:  150-199 mg/dl  High:             200-499 mg/dl  Very high:       >499 mg/dl  Fasting specimen      HDL Cholesterol 38 (L) >39 mg/dL    LDL Cholesterol Calculated 102 (H) <100 mg/dL      Comment:      Above desirable:  100-129 mg/dl  Borderline High:  130-159 mg/dL  High:             160-189 mg/dL  Very high:       >189 mg/dl      Non HDL Cholesterol 152 (H) <130 mg/dL      Comment:      Above Desirable:  130-159 mg/dl  Borderline high:  160-189 mg/dl  High:             190-219 mg/dl  Very high:       >219 mg/dl     Glucose   Result Value Ref Range    Glucose 104 (H) 70 - 99 mg/dL      Comment:      Fasting specimen

## 2018-08-01 NOTE — TELEPHONE ENCOUNTER
"Requested Prescriptions   Pending Prescriptions Disp Refills     naproxen (NAPROSYN) 500 MG tablet [Pharmacy Med Name: NAPROXEN 500MG TABLETS]  Last Written Prescription Date:  07/02/18  Last Fill Quantity: 60,  # refills: 0   Last Office Visit with G, P or [facility] prescribing provider:  07/30/18   Future Office Visit:    60 tablet 0     Sig: TAKE 1 TABLET(500 MG) BY MOUTH TWICE DAILY WITH MEALS    NSAID Medications Failed    7/31/2018 11:12 PM       Failed - Normal AST on file in past 12 months    Recent Labs   Lab Test  12/24/15   0815   AST  20            Failed - Normal CBC on file in past 12 months    Recent Labs   Lab Test  01/06/17   1704   WBC  8.9   RBC  4.67   HGB  14.2   HCT  43.3   PLT  241       For [facility] ONLY: [id number] = WBC, [id number] = RBC         Failed - Normal serum creatinine on file in past 12 months    Recent Labs   Lab Test  03/14/18   1912   CR  0.64*            Passed - Blood pressure under 140/90 in past 12 months    BP Readings from Last 3 Encounters:   07/30/18 115/77   05/03/18 119/71   03/14/18 125/78                Passed - Normal ALT on file in past 12 months    Recent Labs   Lab Test  07/30/18   1603   ALT  61            Passed - Recent (12 mo) or future (30 days) visit within the authorizing provider's specialty    Patient had office visit in the last 12 months or has a visit in the next 30 days with authorizing provider or within the authorizing provider's specialty.  See \"Patient Info\" tab in inbasket, or \"Choose Columns\" in Meds & Orders section of the refill encounter.           Passed - Patient is age 6-64 years          "

## 2018-08-03 RX ORDER — ATORVASTATIN CALCIUM 20 MG/1
TABLET, FILM COATED ORAL
Qty: 90 TABLET | Refills: 0 | OUTPATIENT
Start: 2018-08-03

## 2018-08-03 RX ORDER — NAPROXEN 500 MG/1
TABLET ORAL
Qty: 60 TABLET | Refills: 0 | Status: SHIPPED | OUTPATIENT
Start: 2018-08-03 | End: 2019-12-31

## 2018-08-03 NOTE — TELEPHONE ENCOUNTER
Refill not needed at this time. Rx was sent on 3/16/18 for #90 with 3 refills and sent to same requesting pharmacy. Pt should have available till March 2019 with refills given.    Adilia Fagan RN  [facility] Triage

## 2018-08-03 NOTE — TELEPHONE ENCOUNTER
Routing refill request to provider for review/approval because:  Labs out of range   Labs not current    Adilia Fagan RN  [facility]

## 2019-05-14 ENCOUNTER — OFFICE VISIT (OUTPATIENT)
Dept: FAMILY MEDICINE | Facility: CLINIC | Age: 49
End: 2019-05-14
Payer: COMMERCIAL

## 2019-05-14 VITALS
HEART RATE: 86 BPM | WEIGHT: 144 LBS | HEIGHT: 64 IN | SYSTOLIC BLOOD PRESSURE: 122 MMHG | DIASTOLIC BLOOD PRESSURE: 74 MMHG | RESPIRATION RATE: 16 BRPM | TEMPERATURE: 97.9 F | BODY MASS INDEX: 24.59 KG/M2 | OXYGEN SATURATION: 96 %

## 2019-05-14 DIAGNOSIS — H10.31 ACUTE CONJUNCTIVITIS OF RIGHT EYE, UNSPECIFIED ACUTE CONJUNCTIVITIS TYPE: Primary | ICD-10-CM

## 2019-05-14 PROCEDURE — 99213 OFFICE O/P EST LOW 20 MIN: CPT | Performed by: NURSE PRACTITIONER

## 2019-05-14 RX ORDER — ERYTHROMYCIN 5 MG/G
0.5 OINTMENT OPHTHALMIC 3 TIMES DAILY
Qty: 3.5 G | Refills: 0 | Status: SHIPPED | OUTPATIENT
Start: 2019-05-14 | End: 2019-05-21

## 2019-05-14 ASSESSMENT — MIFFLIN-ST. JEOR: SCORE: 1419.18

## 2019-05-14 NOTE — PROGRESS NOTES
SUBJECTIVE:   Martni Salvador is a 49 year old male who presents to clinic today for the following   health issues:    Eye(s) Problem  Onset: 4-5 days    Description:   Location: right  Pain: YES  Redness: YES    Accompanying Signs & Symptoms:  Discharge/mattering: no   Swelling: no  Visual changes: YES- blurry   Fever: no  Nasal Congestion: no  Bothered by bright lights: no    History:   Trauma: no   Foreign body exposure: no    Precipitating factors:   Wearing contacts: no    Alleviating factors:  Improved by: rubbing eye     Therapies Tried and outcome: none   Itchy in morning, no discharge  No contacts  Feels like something in there  Normal vision.      Additional history: as documented    Reviewed  and updated as needed this visit by clinical staff  Tobacco  Allergies  Meds  Med Hx  Surg Hx  Fam Hx  Soc Hx        Reviewed and updated as needed this visit by Provider         Patient Active Problem List   Diagnosis     External hemorrhoids     Internal hemorrhoids with other complication     Paraseptal emphysema (H)     Pulmonary nodules     Bladder calculi     Impaired fasting glucose     BPH (benign prostatic hyperplasia)     Personal history of tobacco use, presenting hazards to health     Gastroesophageal reflux disease without esophagitis     Hyperlipidemia LDL goal <100     Past Surgical History:   Procedure Laterality Date     LIGATION OF HEMORRHOID(S)         Social History     Tobacco Use     Smoking status: Former Smoker     Packs/day: 1.00     Types: Cigarettes     Start date: 1/1/1984     Last attempt to quit: 1/16/2016     Years since quitting: 3.3     Smokeless tobacco: Never Used   Substance Use Topics     Alcohol use: Yes     Family History   Problem Relation Age of Onset     Diabetes Mother          Current Outpatient Medications   Medication Sig Dispense Refill     erythromycin (ROMYCIN) 5 MG/GM ophthalmic ointment Place 0.5 inches into the right eye 3 times daily for 7 days 3.5 g 0      "naproxen (NAPROSYN) 500 MG tablet TAKE 1 TABLET(500 MG) BY MOUTH TWICE DAILY WITH MEALS 60 tablet 0     omeprazole (PRILOSEC) 20 MG CR capsule TAKE ONE CAPSULE BY MOUTH EVERY DAY 90 capsule 1     atorvastatin (LIPITOR) 20 MG tablet Take 1 tablet (20 mg) by mouth daily (with dinner) (Patient not taking: Reported on 5/14/2019) 90 tablet 3     finasteride (PROSCAR) 5 MG tablet TAKE 1 TABLET BY MOUTH DAILY (Patient not taking: Reported on 7/30/2018) 30 tablet 4     hydrocortisone (ANUSOL-HC) 25 MG Suppository Place 1 suppository (25 mg) rectally 2 times daily (Patient not taking: Reported on 7/30/2018) 28 suppository 0     metFORMIN (GLUCOPHAGE-XR) 500 MG 24 hr tablet TAKE 1 TABLET BY MOUTH EVERY DAY WITH DINNER (Patient not taking: Reported on 7/30/2018) 90 tablet 1     tamsulosin (FLOMAX) 0.4 MG capsule TAKE 2 CAPSULES(0.8 MG) BY MOUTH AT BEDTIME (Patient not taking: Reported on 7/30/2018) 180 capsule 1     triamcinolone (KENALOG) 0.1 % paste Take by mouth 2 times daily (Patient not taking: Reported on 7/30/2018) 5 g 1     No Known Allergies  BP Readings from Last 3 Encounters:   05/14/19 122/74   07/30/18 115/77   05/03/18 119/71    Wt Readings from Last 3 Encounters:   05/14/19 65.3 kg (144 lb)   07/30/18 64 kg (141 lb)   05/03/18 64.9 kg (143 lb)                    ROS:  Constitutional, HEENT, cardiovascular, pulmonary, gi and gu systems are negative, except as otherwise noted.    OBJECTIVE:     /74 (BP Location: Left arm, Patient Position: Chair, Cuff Size: Adult Regular)   Pulse 86   Temp 97.9  F (36.6  C) (Oral)   Resp 16   Ht 1.626 m (5' 4\")   Wt 65.3 kg (144 lb)   SpO2 96%   BMI 24.72 kg/m    Body mass index is 24.72 kg/m .  GENERAL: healthy, alert and no distress  EYES: PERRL, EOMI, visual fields normal and conjunctiva/corneas- conjunctival injection right side, no abrasion noted, no foreign body  HENT: ear canals and TM's normal, nose and mouth without ulcers or lesions    Diagnostic Test " Results:  none     ASSESSMENT/PLAN:     1. Acute conjunctivitis of right eye, unspecified acute conjunctivitis type  Treatment as below.    - erythromycin (ROMYCIN) 5 MG/GM ophthalmic ointment; Place 0.5 inches into the right eye 3 times daily for 7 days  Dispense: 3.5 g; Refill: 0    Patient Instructions   Right eye:  Use ointment three times a day for 7 days.  If not improved, go to eye doctor.      TONI Allen [facility]

## 2019-12-31 ENCOUNTER — OFFICE VISIT (OUTPATIENT)
Dept: FAMILY MEDICINE | Facility: CLINIC | Age: 49
End: 2019-12-31
Payer: COMMERCIAL

## 2019-12-31 VITALS
HEIGHT: 64 IN | WEIGHT: 138 LBS | OXYGEN SATURATION: 98 % | TEMPERATURE: 97.9 F | BODY MASS INDEX: 23.56 KG/M2 | SYSTOLIC BLOOD PRESSURE: 123 MMHG | HEART RATE: 83 BPM | DIASTOLIC BLOOD PRESSURE: 73 MMHG

## 2019-12-31 DIAGNOSIS — R35.0 BENIGN PROSTATIC HYPERPLASIA WITH URINARY FREQUENCY: Primary | ICD-10-CM

## 2019-12-31 DIAGNOSIS — J43.8 PARASEPTAL EMPHYSEMA (H): ICD-10-CM

## 2019-12-31 DIAGNOSIS — E78.5 HYPERLIPIDEMIA LDL GOAL <100: ICD-10-CM

## 2019-12-31 DIAGNOSIS — Z12.11 SPECIAL SCREENING FOR MALIGNANT NEOPLASMS, COLON: ICD-10-CM

## 2019-12-31 DIAGNOSIS — N40.1 BENIGN PROSTATIC HYPERPLASIA WITH URINARY FREQUENCY: Primary | ICD-10-CM

## 2019-12-31 DIAGNOSIS — R73.01 IMPAIRED FASTING GLUCOSE: ICD-10-CM

## 2019-12-31 DIAGNOSIS — K64.9 HEMORRHOIDS, UNSPECIFIED HEMORRHOID TYPE: ICD-10-CM

## 2019-12-31 LAB
CHOLEST SERPL-MCNC: 291 MG/DL
FEF 25/75: 3.21
FEV-1: 2.69
FEV1/FVC: NORMAL
FVC: 3.11
GLUCOSE BLD-MCNC: 106 MG/DL (ref 70–99)
HBA1C MFR BLD: 6 % (ref 0–5.6)
HDLC SERPL-MCNC: 39 MG/DL
LDLC SERPL CALC-MCNC: ABNORMAL MG/DL
NONHDLC SERPL-MCNC: 252 MG/DL
TRIGL SERPL-MCNC: 670 MG/DL

## 2019-12-31 PROCEDURE — 82947 ASSAY GLUCOSE BLOOD QUANT: CPT | Performed by: PHYSICIAN ASSISTANT

## 2019-12-31 PROCEDURE — 83036 HEMOGLOBIN GLYCOSYLATED A1C: CPT | Performed by: PHYSICIAN ASSISTANT

## 2019-12-31 PROCEDURE — 36415 COLL VENOUS BLD VENIPUNCTURE: CPT | Performed by: PHYSICIAN ASSISTANT

## 2019-12-31 PROCEDURE — 94010 BREATHING CAPACITY TEST: CPT | Performed by: PHYSICIAN ASSISTANT

## 2019-12-31 PROCEDURE — 90471 IMMUNIZATION ADMIN: CPT | Performed by: PHYSICIAN ASSISTANT

## 2019-12-31 PROCEDURE — G0103 PSA SCREENING: HCPCS | Performed by: PHYSICIAN ASSISTANT

## 2019-12-31 PROCEDURE — 80061 LIPID PANEL: CPT | Performed by: PHYSICIAN ASSISTANT

## 2019-12-31 PROCEDURE — 90686 IIV4 VACC NO PRSV 0.5 ML IM: CPT | Performed by: PHYSICIAN ASSISTANT

## 2019-12-31 PROCEDURE — 99214 OFFICE O/P EST MOD 30 MIN: CPT | Mod: 25 | Performed by: PHYSICIAN ASSISTANT

## 2019-12-31 RX ORDER — HYDROCORTISONE 2.5 %
CREAM (GRAM) TOPICAL
Qty: 30 G | Refills: 0 | Status: SHIPPED | OUTPATIENT
Start: 2019-12-31 | End: 2020-04-23

## 2019-12-31 RX ORDER — TAMSULOSIN HYDROCHLORIDE 0.4 MG/1
0.4 CAPSULE ORAL DAILY
Qty: 90 CAPSULE | Refills: 0 | Status: SHIPPED | OUTPATIENT
Start: 2019-12-31 | End: 2020-03-30

## 2019-12-31 RX ORDER — FINASTERIDE 5 MG/1
1 TABLET, FILM COATED ORAL DAILY
Qty: 90 TABLET | Refills: 0 | Status: SHIPPED | OUTPATIENT
Start: 2019-12-31 | End: 2020-03-30

## 2019-12-31 ASSESSMENT — PAIN SCALES - GENERAL: PAINLEVEL: NO PAIN (0)

## 2019-12-31 ASSESSMENT — MIFFLIN-ST. JEOR: SCORE: 1391.96

## 2019-12-31 NOTE — PATIENT INSTRUCTIONS
At [facility], we strive to deliver an exceptional experience to you, every time we see you. If you receive a survey, please complete it as we do value your feedback.  If you have MyChart, you can expect to receive results automatically within 24 hours of their completion.  Your provider will send a note interpreting your results as well.   If you do not have MyChart, you should receive your results in about a week by mail.    Your care team:                            Family Medicine Internal Medicine   MD Landon Keith MD Shantel Branch-Fleming, MD Katya Georgiev PA-C Megan Hill, APRN CNP    Tyler Hollins, MD Pediatrics   David Muñiz, PASandraC  Sangita Tellez, MD Ramonita Arroyo APRN CNP   MD Wanda Peters MD Deborah Mielke, MD Kim Thein, APRN [facility]      Clinic hours: Monday - Thursday 7 am-7 pm; Fridays 7 am-5 pm.   Urgent care: Monday - Friday 11 am-9 pm; Saturday and Sunday 9 am-5 pm.  Pharmacy : Monday -Thursday 8 am-8 pm; Friday 8 am-6 pm; Saturday and Sunday 9 am-5 pm.     Clinic: [phone]   Pharmacy: [phone]        Patient Education     BPH (Enlarged Prostate)  The prostate is a gland at the base of the bladder. As some men get older, the prostate may get bigger in size. This problem is called benign prostatic hyperplasia (BPH). BPH puts pressure on the urethra. This is the tube that carries urine from the bladder to the penis. It may interfere with the flow of urine. It may also keep the bladder from emptying fully.    Symptoms of BPH include trouble starting urination and feeling as though the bladder isn t emptying all the way. It also includes a weak urine stream, dribbling and leaking of urine, and frequent and urgent urination (especially at night). BPH can increase the risk of urinary infections. It can also block off urine flow completely. If this occurs, a thin tube (catheter) may be passed into the  bladder to help drain urine.  If symptoms are mild, no treatment may be needed right now. If symptoms are more severe, treatment is likely needed. The goal of treatment is to improve urine flow and reduce symptoms. Treatments can include medicine and procedures. Your healthcare provider will discuss treatment options with you as needed.  Home care  The following guidelines will help you care for yourself at home:    Urinate as soon as you feel the urge. Don't try to hold your urine.    Don't limit your fluid intake during the day. Drink 6 to 8 glasses of water or liquids a day. This prevents bacteria from building up in the bladder.    Avoid drinking fluids after dinner to help reduce urination during the night.    Avoid medicines that can worsen your symptoms. These include certain cold and allergy medicines and antidepressants. Diuretics used for high blood pressure can also worsen symptoms. Talk to your doctor about the medicines you take. Other choices may work better for you.  Prostate cancer screening  BPH does not increase the risk of prostate cancer. But because prostate cancer is a common cancer in men, screening is sometimes recommended. This may help detect the cancer in its early stages when treatment is most effective. Factors that can increase the risk of prostate cancer include being -American or having a father or brother who had prostate cancer. A high-fat diet may also increase the risk of prostate cancer. Talk to your healthcare provider to see whether you should be screened for prostate cancer.  Follow-up care  Follow up with your healthcare provider, or as advised  To learn more, go to:    National Kidney & Urologic Diseases Information Clearinghouse  kidney.niddk.nih.gov, 504.642.6584  When to seek medical advice  Call your healthcare provider right away if any of these occur:    Fever of 100.4 F (38.0 C) or higher, or as advised    Unable to pass urine for 8 hours    Increasing pressure  "or pain in your bladder (lower abdomen)    Blood in the urine    Increasing low back pain, not related to injury    Symptoms of urinary infection (increased urge to urinate, burning when passing urine, foul-smelling urine)  Date Last Reviewed: 7/1/2016 2000-2018 [employer]. [address] [place] [address], [place] [address]. All rights reserved. This information is not intended as a substitute for professional medical care. Always follow your healthcare professional's instructions.           Patient Education     Controlling Your Cholesterol  Cholesterol is a waxy substance. It travels in your blood through the blood vessels. When you have high cholesterol, it can build up along the walls of the blood vessels. This makes the vessels narrower and decreases blood flow. You are then at greater risk of having a heart attack or a stroke.  Good and bad cholesterol  Lipids are fats, and blood is mostly water. Fat and water don't mix. So our bodies need lipoproteins (lipids inside a protein shell) to carry the lipids. The protein shell carries its lipids through the bloodstream. There are two main kinds of lipoproteins:    LDL (low-density lipoprotein) is known as \"bad cholesterol.\" It mainly carries cholesterol. It delivers this cholesterol to body cells. Excess LDL cholesterol will build up in artery walls. This increases your risk for heart disease and stroke.    HDL (high-density lipoprotein) is known as \"good cholesterol.\" This protein shell collects excess cholesterol that LDLs have left behind on blood vessel walls. That's why high levels of HDL cholesterol can decrease your risk of heart disease and stroke.  Controlling cholesterol levels  Total cholesterol includes LDL and HDL cholesterol, as well as other fats in the bloodstream. If your total cholesterol is high, follow the steps below to help lower your total cholesterol level:  Eat less unhealthy fat    Cut back on saturated fats and trans fats (also " called hydrogenated) by selecting lean cuts of meat, low-fat dairy, and using oils instead of solid fats. Limit baked goods, processed meats, and fried foods. A diet that s high in these fats increases your bad cholesterol. It's not enough to just cut back on foods containing cholesterol.    Eat about 2 servings of fish per week. Most fish contain omega-3 fatty acids. These help lower blood cholesterol.    Eat more whole grains and soluble fiber (such as oat bran). These lower overall cholesterol.  Be active    Choose an activity you enjoy. Walking, swimming, and riding a bike are some good ways to be active.    Start at a level where you feel comfortable. Increase your time and pace a little each week.    Work up to 30 to 40 minutes of moderate to high intensity physical activity at least 3 to 4 days per week.    Remember, some activity is better than none.    If you haven't been exercising regularly, start slowly. Check with your healthcare provider to make sure the exercise plan is right for you.  Quit smoking  Quitting smoking can improve your lipid levels. It also lowers your risk for heart disease and stroke.  Manage your weight  If you are overweight or obese, your healthcare provider will work with you to lose weight and lower your BMI (body mass index) to a normal or near-normal level. Making diet changes and increasing physical activity can help.  Take medicine as directed  Many people need medicine to get their LDL levels to a safe level. Medicine to lower cholesterol levels is effective and safe. Taking medicine is not a substitute for exercise or watching your diet! Your healthcare provider can tell you whether you might benefit from a cholesterol-lowering medicine.  Date Last Reviewed: 6/1/2017 2000-2018 [employer]. [address] [place] [address], [place] [address]. All rights reserved. This information is not intended as a substitute for professional medical care. Always follow your  healthcare professional's instructions.           Patient Education     Prediabetes  You have been diagnosed with prediabetes. This means that the level of sugar (glucose) in your blood is too high. If you have prediabetes, you are at risk for developing type 2 diabetes. Type 2 diabetes is diagnosed when the level of glucose in the blood reaches a certain high level. With prediabetes, it hasn t reached this point yet, but it is higher than normal. It is vital to make lifestyle changes to lower your blood sugar, improve your health, and prevent diabetes. This sheet will tell you more.      Why worry about prediabetes?  Prediabetes is a disease where the body s cells have trouble using glucose in the blood for energy. As a result, too much glucose stays in the blood and can affect how your heart and blood vessels work. Without changes in diet and lifestyle, the problem can get worse. Once you have type 2 diabetes, it is chronic (ongoing) and needs to be managed for the rest of your life. Diabetes can harm the body and your health by damaging organs, such as your eyes and kidneys. It makes you more likely to have heart disease. And it can damage nerves and blood vessels.  Who is a risk for prediabetes?  The exact cause of prediabetes is not clear. But certain risk factors make a person more likely to have it. These include:    A family history of type 2 diabetes    Being overweight    Being over age 45    Have hypertension or elevated cholesterol     Having had gestational diabetes    Not being physically active    Being ,  American, , , , or   Diagnosing prediabetes  Prediabetes may have no symptoms or you may have some of the symptoms of diabetes. The diagnosis is made with a blood test. You may have one or more of these blood tests:     Fasting glucose test. Blood is taken and tested after you have fasted (not eaten) for at least 8 hours. A  normal test result is 99 milligrams per deciliter (mg/dL) or lower. Prediabetes is 100 mg/dL to 125 mg/dL. Diabetes is 126 mg/dL or higher.    Glucose tolerance test. Your blood sugar is measured before and after you drink a very sugary liquid. A normal test result is 139 milligrams per deciliter (mg/dL) or lower. Prediabetes is 140 mg/dL to 199 mg/dL. Diabetes is 200 mg/dL or higher.    Hemoglobin A1c (HbA1c). Your HbA1c is normal if it is below 5.7%. Prediabetes is 5.7% to 6.4%. Diabetes is 6.5% or higher.   Treating prediabetes  The best way to treat prediabetes is to lose at least 5% to 7% of your current weight and be more physically active by getting at least 150 minutes a week of physical activity. When sitting for long periods of time, get up for short sessions of light activity every 30 minutes. These changes help the body s cells use blood sugar better. Even a small amount of weight loss can help. Work with your healthcare provider to make a plan to eat well and be more active. Keep in mind that small changes can add up. Other changes in your lifestyle (or even taking certain medicines, such as metformin) may make you less likely to develop diabetes. Your healthcare provider can talk with you about these.  Follow-up  If it is untreated, prediabetes can turn into diabetes. This is a serious health condition. Take steps to stop this from happening. Follow the treatment plan you have been given. You may have your blood glucose tested again in about 12 to 18 months.  Symptoms of diabetes  Let your healthcare provider know if you have any of the following:    Always feel very tired    Feel very thirsty or hungry much of the time    Have to urinate often    Lose weight for no reason    Feel numbness or tingling in your fingers or toes    Have cuts or bruises that don t seem to heal    Have blurry vision   Date Last Reviewed: 5/1/2016 2000-2018 [employer]. [address] [facility], [place]  [address]. All rights reserved. This information is not intended as a substitute for professional medical care. Always follow your healthcare professional's instructions.           Patient Education     Preventing Diabetes  What is diabetes?  Diabetes is a disease that causes high blood sugar. Over time, high blood sugar can lead to a number of health problems.  You are at risk for diabetes if you:    Are overweight    Don't get enough exercise    Have a parent, brother or sister with diabetes    Are , , ,  American or     Have high blood pressure (over 130/80)    Have low HDL cholesterol (35 or lower)    Have high triglycerides (150 or higher)    Are over age 45    Have had a baby weighing more than 9 pounds    Have had gestational diabetes (diabetes during pregnancy)    Have PCOS (polycystic ovary syndrome).  If you are at risk of getting diabetes, you can take steps now to prevent or delay its onset.  How can I reduce my risk of getting diabetes?  Follow the steps below. These can reduce your risk for diabetes by up to 60%.  1. Exercise 30 minutes a day, at least five times per week (or 150 minutes of exercise per week).  2. Lose weight if you need to. If you are overweight, losing just 5 to 10% of your body weight (an average of 15 pounds) may reduce your risk.  3. Cut back on the fat and calories in your diet.  You should also see your doctor every year to check for diabetes.  How would I know if I had diabetes?  To check for diabetes, your doctor will do a blood test to measure your blood sugar. You cannot eat or drink anything for several hours before this test.    If your blood sugar is less than 100, you do not have diabetes.    If it is between 100 and 125, you have pre-diabetes.    If it is 126 or higher on two different days, you have diabetes.  The doctor may order more tests to confirm the results.  What is pre-diabetes?  Pre-diabetes is when your  blood sugar level is higher than normal, but not high enough to be called diabetes.  Pre-diabetes will usually turn into diabetes in a short time if you do not change your health habits.  Damage to your body, especially the heart and blood vessels, may already be occurring with pre-diabetes. If you have pre-diabetes, it is important to start making healthy choices now.  What should I do if I get diabetes?  If not controlled, diabetes can lead to blindness, kidney failure, nerve damage, heart attack, stroke or amputation (surgery to remove a limb).  You will greatly reduce your risks if you control your diabetes. You may need to change your diet, get more exercise, take medicine and test your blood sugar often.  You will also need to:    Learn as much as you can about diabetes. This will give you the tools you need to build healthy habits. Be sure to attend a diabetes class or meet with a diabetes educator.    Work closely with a doctor who understands diabetes.    Get support from family and friends. Support is vital when you are making many changes in your life.  How can I find a diabetes education program?  [facility] offers complete diabetes education and ongoing care. Call [phone] for details or to schedule a visit.  Or call The American Diabetes Association at 9-156-DIABETES and ask for a program near you. You can also check their website at www.diabetes.org.  For informational purposes only. Not to replace the advice of your health care provider.  Copyright   2008 [facility]. All rights reserved. [employer] 170807 - 12/15.  For informational purposes only. Not to replace the advice of your health care provider.  Copyright   2018 [employer] [facility]. All rights reserved.

## 2019-12-31 NOTE — PROGRESS NOTES
Subjective     Martin Salvador is a 49 year old male who presents to clinic today for the following health issues:    HPI   Genitourinary - Male  Onset: about 1 year    Description:   Dysuria (painful urination): no   Hematuria (blood in urine): no   Frequency: YES  Are you urinating at night : YES  Hesitancy (delay in urine): YES  Retention (unable to empty): YES  Decrease in urinary flow: no   Incontinence: no     Progression of Symptoms:  worsening    Accompanying Signs & Symptoms:  Fever: no   Back/Flank pain: no   Urethral discharge: no   Testicle lumps/masses/pain: no   Nausea and/or vomiting: no   Abdominal pain: no     History:   History of frequent UTI's: no   History of kidney stones: YES  History of hernias: no   Personal or Family history of Prostate problems: YES  Sexually active: YES    Precipitating factors:   none    Alleviating factors:  none    He basically stopped all his meds.    The 10-year ASCVD risk score (Bing TOBIAS Jr., et al., 2013) is: 4.4%    Values used to calculate the score:      Age: 49 years      Sex: Male      Is Non- : No      Diabetic: No      Tobacco smoker: No      Systolic Blood Pressure: 123 mmHg      Is BP treated: No      HDL Cholesterol: 38 mg/dL      Total Cholesterol: 190 mg/dL              No Known Allergies    Patient Active Problem List   Diagnosis     External hemorrhoids     Internal hemorrhoids with other complication     Pulmonary nodules     Bladder calculi     Impaired fasting glucose     BPH (benign prostatic hyperplasia)     Personal history of tobacco use, presenting hazards to health     Gastroesophageal reflux disease without esophagitis     Hyperlipidemia LDL goal <100       Past Medical History:   Diagnosis Date     External hemorrhoids      Hemorrhoids, internal      Paraseptal emphysema (H) 1/26/2016     Tobacco dependence        triamcinolone (KENALOG) 0.1 % paste, Take by mouth 2 times daily (Patient not taking: Reported on  "7/30/2018)    No current facility-administered medications on file prior to visit.       Social History     Tobacco Use     Smoking status: Former Smoker     Packs/day: 1.00     Types: Cigarettes     Start date: 1/1/1984     Last attempt to quit: 1/16/2016     Years since quitting: 3.9     Smokeless tobacco: Never Used   Substance Use Topics     Alcohol use: Yes     Drug use: No       ROS:   GEN: NO fevers   hx BPH  ENDO hcx prediabetes  RESP hx pulm nodules (stable on serial CT) w/mild asymptomatic per patient, COPD    OBJECTIVE:  /73   Pulse 83   Temp 97.9  F (36.6  C) (Oral)   Ht 1.626 m (5' 4\")   Wt 62.6 kg (138 lb)   SpO2 98%   BMI 23.69 kg/m     General:   awake, alert, and cooperative.  NAD.   Head: Normocephalic, atraumatic.  Eyes: Conjunctiva clear,   Lungs: Regular rate  Neuro: Alert and oriented - normal speech.    Spirometry was normal  A1c 6  Glucose 106    ASSESSMENT:well appearing, resume meds as below. Routine testing as below.  A1C stable w/o metformin- will continue to hold. [place] risk low on prev lipids results, will hold statin for now.      ICD-10-CM    1. Benign prostatic hyperplasia with urinary frequency N40.1 PSA, screen    R35.0 finasteride (PROSCAR) 5 MG tablet     tamsulosin (FLOMAX) 0.4 MG capsule   2. Impaired fasting glucose R73.01 Glucose whole blood     Hemoglobin A1c   3. Hyperlipidemia LDL goal <100 E78.5 Lipid Profile   4. Paraseptal emphysema (H) J43.8 Spirometry, Breathing Capacity: Normal Order, Clinic Performed   5. Hemorrhoids, unspecified hemorrhoid type K64.9 hydrocortisone 2.5 % cream   6. Special screening for malignant neoplasms, colon Z12.11 Fecal colorectal cancer screen FIT       PLAN:   Follow up: 6 months   Advised about symptoms which might herald more serious problems.              "

## 2020-01-02 LAB — PSA SERPL-ACNC: 1.32 UG/L (ref 0–4)

## 2020-01-03 ENCOUNTER — TELEPHONE (OUTPATIENT)
Dept: FAMILY MEDICINE | Facility: CLINIC | Age: 50
End: 2020-01-03

## 2020-01-03 DIAGNOSIS — E78.5 HYPERLIPIDEMIA LDL GOAL <100: Primary | ICD-10-CM

## 2020-01-03 RX ORDER — ATORVASTATIN CALCIUM 20 MG/1
20 TABLET, FILM COATED ORAL DAILY
Qty: 90 TABLET | Refills: 1 | Status: SHIPPED | OUTPATIENT
Start: 2020-01-03 | End: 2020-11-27

## 2020-01-03 NOTE — TELEPHONE ENCOUNTER
Patient contacted and informed of the below per provider documentation. Patient verbalizes understanding. He is requesting education on high cholesterol and diet.     Please mail. I put information in the basket for the team to mail.     Rosibel Ramos RN

## 2020-01-03 NOTE — TELEPHONE ENCOUNTER
Please call patient with results: His cholesterol is very high.  We should restart his atorvastatin to lower his risk of heart attacks or strokes.  I have sent this to the pharmacy.  His prostate test was normal.      We are awaiting the stool sample.      David Muñiz PA-C            The 10-year ASCVD risk score (Bing TOBIAS Jr., et al., 2013) is: 7.6%    Values used to calculate the score:      Age: 49 years      Sex: Male      Is Non- : No      Diabetic: No      Tobacco smoker: No      Systolic Blood Pressure: 123 mmHg      Is BP treated: No      HDL Cholesterol: 39 mg/dL      Total Cholesterol: 291 mg/dL

## 2020-01-04 DIAGNOSIS — Z12.11 SPECIAL SCREENING FOR MALIGNANT NEOPLASMS, COLON: ICD-10-CM

## 2020-01-04 LAB — HEMOCCULT STL QL IA: NEGATIVE

## 2020-01-04 PROCEDURE — 82274 ASSAY TEST FOR BLOOD FECAL: CPT | Performed by: PHYSICIAN ASSISTANT

## 2020-01-08 ENCOUNTER — TELEPHONE (OUTPATIENT)
Dept: FAMILY MEDICINE | Facility: CLINIC | Age: 50
End: 2020-01-08

## 2020-01-08 DIAGNOSIS — E78.5 HYPERLIPIDEMIA LDL GOAL <100: Primary | ICD-10-CM

## 2020-01-08 NOTE — TELEPHONE ENCOUNTER
Reason for Call: Call back.    Detailed comments: PT would like a nurse to call him back in regards to his labs.    Phone Number Patient can be reached at: Home number on file [phone].    Best Time: Anytime.    Can we leave a detailed message on this number? YES    Call taken on 1/8/2020 at 2:51 PM by Jess Ann

## 2020-01-08 NOTE — TELEPHONE ENCOUNTER
"Returned call to patient.    He wanted to note that he was NOT fasting for his lab draw on 12/31/19 when had cholesterol checked. He said he spoke with his sister and mentioned high cholesterol (was recommended to take Atorvastatin) and she told him the best time to check these levels is in the morning and to be fasting.    Patient noting his apt was late in the afternoon and that he had eaten lunch about 2:00 pm, just prior to apt. Had big bowl of \"Licha\" soup, with noodles, broth, vegetables, and beef. May have also eaten breakfast that day, does not remember, but knows he had lunch.  Is wondering if this affected his results and if should be repeated with a fasting specimen.    Routing to provider to review and advise.    Adilia Fagan RN  [facility]      "

## 2020-01-09 NOTE — TELEPHONE ENCOUNTER
Patient contacted and informed of the below per provider documentation. Patient verbalizes understanding and still would like to repeat the test fasting. Please place order. He elects to call back to schedule lab only appt.     Rosibel Ramos RN

## 2020-01-09 NOTE — TELEPHONE ENCOUNTER
Even correcting for not being fasting his cholesterol was still pretty high.  If he wants to repeat it fasting I can certainly order that  David Muñiz PA-C

## 2020-03-27 DIAGNOSIS — N40.1 BENIGN PROSTATIC HYPERPLASIA WITH URINARY FREQUENCY: ICD-10-CM

## 2020-03-27 DIAGNOSIS — R35.0 BENIGN PROSTATIC HYPERPLASIA WITH URINARY FREQUENCY: ICD-10-CM

## 2020-03-27 NOTE — TELEPHONE ENCOUNTER
"Requested Prescriptions   Pending Prescriptions Disp Refills     finasteride (PROSCAR) 5 MG tablet 90 tablet 0     Sig: Take 1 tablet (5 mg) by mouth daily       BPH Agents Passed - 3/27/2020  4:13 PM        Passed - Recent (12 mo) or future (30 days) visit within the authorizing provider's department     Patient has had an office visit with the authorizing provider or a provider within the authorizing providers department within the previous 12 mos or has a future within next 30 days. See \"Patient Info\" tab in inbasket, or \"Choose Columns\" in Meds & Orders section of the refill encounter.              Passed - Medication is active on med list        Passed - Patient is 18 years of age or older           Last Written Prescription Date:  12/31/19  Last Fill Quantity: 90,  # refills: 0   Last office visit: 12/31/2019 with prescribing provider:     Future Office Visit:      "

## 2020-03-27 NOTE — TELEPHONE ENCOUNTER
"Requested Prescriptions   Pending Prescriptions Disp Refills     tamsulosin (FLOMAX) 0.4 MG capsule 90 capsule 0     Sig: Take 1 capsule (0.4 mg) by mouth daily       Alpha Blockers Passed - 3/27/2020  4:11 PM        Passed - Blood pressure under 140/90 in past 12 months     BP Readings from Last 3 Encounters:   12/31/19 123/73   05/14/19 122/74   07/30/18 115/77                 Passed - Recent (12 mo) or future (30 days) visit within the authorizing provider's specialty     Patient has had an office visit with the authorizing provider or a provider within the authorizing providers department within the previous 12 mos or has a future within next 30 days. See \"Patient Info\" tab in inbasket, or \"Choose Columns\" in Meds & Orders section of the refill encounter.              Passed - Patient does not have Tadalafil, Vardenafil, or Sildenafil on their medication list        Passed - Medication is active on med list        Passed - Patient is 18 years of age or older           Last Written Prescription Date:  12/31/1+9  Last Fill Quantity: 90,  # refills: 0   Last office visit: 12/31/2019 with prescribing provider:     Future Office Visit:      "

## 2020-03-30 RX ORDER — TAMSULOSIN HYDROCHLORIDE 0.4 MG/1
0.4 CAPSULE ORAL DAILY
Qty: 90 CAPSULE | Refills: 1 | Status: SHIPPED | OUTPATIENT
Start: 2020-03-30 | End: 2020-09-22

## 2020-03-30 RX ORDER — FINASTERIDE 5 MG/1
1 TABLET, FILM COATED ORAL DAILY
Qty: 90 TABLET | Refills: 2 | Status: SHIPPED | OUTPATIENT
Start: 2020-03-30 | End: 2020-11-27

## 2020-03-30 NOTE — TELEPHONE ENCOUNTER
Prescription approved per [facility].      Toño Saldaña RN, BSN, PHN

## 2020-03-30 NOTE — TELEPHONE ENCOUNTER
Prescription approved per [facility] Refill Protocol.      Toño Saldaña RN, BSN, PHN

## 2020-04-07 ENCOUNTER — VIRTUAL VISIT (OUTPATIENT)
Dept: FAMILY MEDICINE | Facility: CLINIC | Age: 50
End: 2020-04-07
Payer: COMMERCIAL

## 2020-04-07 ENCOUNTER — TELEPHONE (OUTPATIENT)
Dept: FAMILY MEDICINE | Facility: CLINIC | Age: 50
End: 2020-04-07

## 2020-04-07 DIAGNOSIS — K04.7 TOOTH ABSCESS: Primary | ICD-10-CM

## 2020-04-07 PROCEDURE — 99441 ZZC PHYSICIAN TELEPHONE EVALUATION 5-10 MIN: CPT | Performed by: PHYSICIAN ASSISTANT

## 2020-04-07 RX ORDER — IBUPROFEN 600 MG/1
600 TABLET, FILM COATED ORAL EVERY 6 HOURS PRN
Qty: 30 TABLET | Refills: 0 | Status: SHIPPED | OUTPATIENT
Start: 2020-04-07 | End: 2020-11-27

## 2020-04-07 RX ORDER — AMOXICILLIN 500 MG/1
500 CAPSULE ORAL 3 TIMES DAILY
Qty: 30 CAPSULE | Refills: 0 | Status: SHIPPED | OUTPATIENT
Start: 2020-04-07 | End: 2020-04-17

## 2020-04-07 NOTE — TELEPHONE ENCOUNTER
Patient reports he is having tooth pain and some swelling to the area. He has an old  Rx for an antibiotic so he wants a new one.     Scheduled patient for telephone visit with provider today at 5 pm.     Rosibel Ramos RN  [facility]

## 2020-04-07 NOTE — PROGRESS NOTES
"Subjective     Martin Salvador is a 50 year old male who is being evaluated via a billable telephone visit.      The patient has been notified of following:     \"This telephone visit will be conducted via a call between you and your physician/provider. We have found that certain health care needs can be provided without the need for a physical exam.  This service lets us provide the care you need with a short phone conversation.  If a prescription is necessary we can send it directly to your pharmacy.  If lab work is needed we can place an order for that and you can then stop by our lab to have the test done at a later time.    Telephone visits are billed at different rates depending on your insurance coverage. During this emergency period, for some insurers they may be billed the same as an in-person visit.  Please reach out to your insurance provider with any questions.    If during the course of the call the physician/provider feels a telephone visit is not appropriate, you will not be charged for this service.\"    Patient has given verbal consent for Telephone visit?  Yes    Martin Salvador complains of   Chief Complaint   Patient presents with     Dental Pain     tooth pain and swelling on right side- was presribed amoxicillin in the past. been gargling warm water with salt.      Amoxicillin was old from 2018  Pain started 1 day ago. Patient sees mild swelling, just like he had in 2018 and he got Amoxicillin then that helped.       ALLERGIES  Patient has no known allergies.               Patient Active Problem List   Diagnosis     External hemorrhoids     Internal hemorrhoids with other complication     Pulmonary nodules     Bladder calculi     Impaired fasting glucose     BPH (benign prostatic hyperplasia)     Personal history of tobacco use, presenting hazards to health     Gastroesophageal reflux disease without esophagitis     Hyperlipidemia LDL goal <100     Past Surgical History:   Procedure Laterality Date     " LIGATION OF HEMORRHOID(S)         Social History     Tobacco Use     Smoking status: Former Smoker     Packs/day: 1.00     Types: Cigarettes     Start date: 1984     Last attempt to quit: 2016     Years since quittin.2     Smokeless tobacco: Never Used   Substance Use Topics     Alcohol use: Yes     Family History   Problem Relation Age of Onset     Diabetes Mother          Current Outpatient Medications   Medication Sig Dispense Refill     amoxicillin (AMOXIL) 500 MG capsule Take 1 capsule (500 mg) by mouth 3 times daily for 10 days 30 capsule 0     atorvastatin (LIPITOR) 20 MG tablet Take 1 tablet (20 mg) by mouth daily 90 tablet 1     finasteride (PROSCAR) 5 MG tablet Take 1 tablet (5 mg) by mouth daily 90 tablet 2     hydrocortisone 2.5 % cream Apply BID to affected region(s) for 7-10 days. 30 g 0     ibuprofen (ADVIL/MOTRIN) 600 MG tablet Take 1 tablet (600 mg) by mouth every 6 hours as needed for moderate pain 30 tablet 0     tamsulosin (FLOMAX) 0.4 MG capsule Take 1 capsule (0.4 mg) by mouth daily 90 capsule 1     triamcinolone (KENALOG) 0.1 % paste Take by mouth 2 times daily (Patient not taking: Reported on 2018) 5 g 1     No Known Allergies    Reviewed and updated as needed this visit by Provider  Tobacco  Allergies  Meds  Problems  Med Hx  Surg Hx  Fam Hx         Review of Systems   ROS COMP: Constitutional, HEENT, cardiovascular, pulmonary, gi and gu systems are negative, except as otherwise noted.       Objective   Reported vitals:  There were no vitals taken for this visit.   healthy, alert and no distress  Psych: Alert and oriented times 3; coherent speech, normal   rate and volume, able to articulate logical thoughts, able   to abstract reason, no tangential thoughts, no hallucinations   or delusions  His affect is normal     Labs reviewed in Epic        Assessment/Plan:  1. Tooth abscess  Amoxicillin 500 mg three times a day for 10 days   Ibuprofen 600 mg every 6 hours as  needed for pain  If infection continues to get worse find an emergency doctor or go to ED  Patient agreed with the plan  - amoxicillin (AMOXIL) 500 MG capsule; Take 1 capsule (500 mg) by mouth 3 times daily for 10 days  Dispense: 30 capsule; Refill: 0  - ibuprofen (ADVIL/MOTRIN) 600 MG tablet; Take 1 tablet (600 mg) by mouth every 6 hours as needed for moderate pain  Dispense: 30 tablet; Refill: 0    Return in about 10 days (around 4/17/2020), or if symptoms worsen or fail to improve.      Phone call duration:  9 minutes    Monika Guzmán PA-C

## 2020-04-07 NOTE — TELEPHONE ENCOUNTER
Reason for Call:  Other prescription    Detailed comments: Pt states that he is having tooth pain and would like to know if he can get this medication re prescribed for him as he states it has helped in the past and he doesn't want to go to the ER. Thank you.    [id number] - [place], [place] - [address] [facility]  [phone]    Phone Number Patient can be reached at: Home number on file [phone] (home)    Best Time: Any    Can we leave a detailed message on this number? YES    Call taken on 4/7/2020 at 1:32 PM by Meaghan Nogueira

## 2020-08-13 ENCOUNTER — TRANSFERRED RECORDS (OUTPATIENT)
Dept: FAMILY MEDICINE | Facility: CLINIC | Age: 50
End: 2020-08-13

## 2020-09-22 ENCOUNTER — OFFICE VISIT (OUTPATIENT)
Dept: URGENT CARE | Facility: URGENT CARE | Age: 50
End: 2020-09-22
Payer: COMMERCIAL

## 2020-09-22 VITALS
OXYGEN SATURATION: 99 % | WEIGHT: 138.6 LBS | SYSTOLIC BLOOD PRESSURE: 123 MMHG | BODY MASS INDEX: 23.79 KG/M2 | DIASTOLIC BLOOD PRESSURE: 77 MMHG | RESPIRATION RATE: 16 BRPM | TEMPERATURE: 96.2 F | HEART RATE: 97 BPM

## 2020-09-22 DIAGNOSIS — Z76.0 ENCOUNTER FOR MEDICATION REFILL: ICD-10-CM

## 2020-09-22 DIAGNOSIS — B02.7 DISSEMINATED HERPES ZOSTER: Primary | ICD-10-CM

## 2020-09-22 DIAGNOSIS — N40.1 BENIGN PROSTATIC HYPERPLASIA WITH URINARY FREQUENCY: ICD-10-CM

## 2020-09-22 DIAGNOSIS — R35.0 BENIGN PROSTATIC HYPERPLASIA WITH URINARY FREQUENCY: ICD-10-CM

## 2020-09-22 PROCEDURE — 99214 OFFICE O/P EST MOD 30 MIN: CPT | Performed by: NURSE PRACTITIONER

## 2020-09-22 RX ORDER — VALACYCLOVIR HYDROCHLORIDE 1 G/1
1000 TABLET, FILM COATED ORAL 3 TIMES DAILY
Qty: 21 TABLET | Refills: 0 | Status: SHIPPED | OUTPATIENT
Start: 2020-09-22 | End: 2020-11-27

## 2020-09-22 RX ORDER — TAMSULOSIN HYDROCHLORIDE 0.4 MG/1
0.4 CAPSULE ORAL DAILY
Qty: 30 CAPSULE | Refills: 0 | Status: SHIPPED | OUTPATIENT
Start: 2020-09-22 | End: 2020-10-22

## 2020-09-22 ASSESSMENT — ENCOUNTER SYMPTOMS
NAUSEA: 0
FREQUENCY: 1
DIAPHORESIS: 0
CHILLS: 0
COUGH: 0
RHINORRHEA: 0
VOMITING: 0
SORE THROAT: 0
DIARRHEA: 0
FEVER: 0
SHORTNESS OF BREATH: 0

## 2020-09-22 NOTE — PROGRESS NOTES
SUBJECTIVE:   Martin Salvador is a 50 year old male presenting with a chief complaint of   Chief Complaint   Patient presents with     Derm Problem     Rash on neck x2-3 days     Ear Problem     Left ear hurting       He is an established patient of [facility].    Rash    Onset of rash was 3 day(s) ago.   Course of illness is worsening.  Severity moderate  Current and Associated symptoms: painful, red and blistering   Location of the rash: left neck, left scalp.  Previous history of a similar rash? No  Recent exposure history: none known  Denies exposure to: none known  Associated symptoms include: left ear pain and left scalp pain.  Treatment measures tried include: home ointment  Patient has benign prostate hypertrophy and reporting that he ran out of his medications and is been having frequency of urination.  He denies dysuria.      Review of Systems   Constitutional: Negative for chills, diaphoresis and fever.   HENT: Positive for ear pain. Negative for congestion, rhinorrhea and sore throat.    Respiratory: Negative for cough and shortness of breath.    Gastrointestinal: Negative for diarrhea, nausea and vomiting.   Genitourinary: Positive for frequency.   Skin: Positive for rash.        Left scalp pain   All other systems reviewed and are negative.      Past Medical History:   Diagnosis Date     External hemorrhoids      Hemorrhoids, internal      Paraseptal emphysema (H) 1/26/2016     Tobacco dependence      Family History   Problem Relation Age of Onset     Diabetes Mother      Current Outpatient Medications   Medication Sig Dispense Refill     tamsulosin (FLOMAX) 0.4 MG capsule Take 1 capsule (0.4 mg) by mouth daily 30 capsule 0     valACYclovir (VALTREX) 1000 mg tablet Take 1 tablet (1,000 mg) by mouth 3 times daily for 7 days 21 tablet 0     atorvastatin (LIPITOR) 20 MG tablet Take 1 tablet (20 mg) by mouth daily (Patient not taking: Reported on 9/22/2020) 90 tablet 1     finasteride (PROSCAR) 5 MG tablet Take  1 tablet (5 mg) by mouth daily (Patient not taking: Reported on 2020) 90 tablet 2     hydrocortisone 2.5 % cream Apply BID to affected region(s) for 7-10 days. (Patient not taking: Reported on 2020) 30 g 0     ibuprofen (ADVIL/MOTRIN) 600 MG tablet Take 1 tablet (600 mg) by mouth every 6 hours as needed for moderate pain (Patient not taking: Reported on 2020) 30 tablet 0     triamcinolone (KENALOG) 0.1 % paste Take by mouth 2 times daily (Patient not taking: Reported on 2018) 5 g 1     Social History     Tobacco Use     Smoking status: Former Smoker     Packs/day: 1.00     Types: Cigarettes     Start date: 1984     Last attempt to quit: 2016     Years since quittin.6     Smokeless tobacco: Never Used   Substance Use Topics     Alcohol use: Yes       OBJECTIVE  /77   Pulse 97   Temp 96.2  F (35.7  C) (Tympanic)   Resp 16   Wt 62.9 kg (138 lb 9.6 oz)   SpO2 99%   BMI 23.79 kg/m      Physical Exam  Vitals signs and nursing note reviewed.   Constitutional:       General: He is not in acute distress.     Appearance: He is well-developed. He is not diaphoretic.   HENT:      Head: Normocephalic and atraumatic.      Right Ear: Tympanic membrane and external ear normal.      Left Ear: Tympanic membrane and external ear normal.   Eyes:      Pupils: Pupils are equal, round, and reactive to light.   Neck:      Musculoskeletal: Normal range of motion and neck supple.   Pulmonary:      Effort: Pulmonary effort is normal. No respiratory distress.      Breath sounds: Normal breath sounds.   Lymphadenopathy:      Cervical: No cervical adenopathy.   Skin:     General: Skin is warm and dry.      Comments: Examination of the rash reveals Inflamed patches of clear fluid-filled vesicles located along a dermatome on the left neck and left scalp.     Neurological:      Mental Status: He is alert.      Cranial Nerves: No cranial nerve deficit.         ASSESSMENT:      ICD-10-CM    1. Disseminated  herpes zoster  B02.7 valACYclovir (VALTREX) 1000 mg tablet   2. Benign prostatic hyperplasia with urinary frequency  N40.1     R35.0    3. Encounter for medication refill  Z76.0 tamsulosin (FLOMAX) 0.4 MG capsule        PLAN:  I have discussed what shingles does.  Will order antiviral medications.  I have discussed that the left ear and left scalp pain is related to shingles  Take the antivirals as discussed.  Advised follow-up with a primary care provider to evaluate the prostate.  Refills for a month will be sent.  Questions are answered and patient is in agreement with plan.  Patient Instructions     Patient Education     Shingles  Shingles is a viral infection caused by the same virus that causes chicken pox. Anyone who has had chicken pox may get shingles later in life. The virus stays in the body, but remains asleep (dormant). Shingles often occurs in older persons or persons with lowered immunity. But it can affect anyone at any age.  Shingles starts as a tingling patch of skin on one side of the body. Small, painful blisters may then appear. The rash rarely spreads to other parts of the body.  Exposure to shingles can't cause shingles. However, it can cause chicken pox in anyone who has not had chicken pox or has not been vaccinated. The contagious period ends when all blisters have crusted over, generally 1 to 2 weeks after the illness starts.  After the blisters heal, the affected skin may be sensitive or painful for weeks or months, gradually resolving over time. But, sometimes this can last longer and be permanent (called postherpetic neuralgia.)  Shingles vaccines are available. Vaccination can help prevent shingles or make it less painful. It is generally recommended for adults older than 50, even if you've had singles in the past. Talk with your healthcare provider about when to get vaccinated and which vaccine is best for you.  Home care    Medicines may be prescribed to help relieve pain. Take these  medicines as directed. Ask your healthcare provider or pharmacist before using over-the-counter medicines for helping treat pain and itching.    In certain cases, antiviral medicines may be prescribed to reduce pain, shorten the illness, and prevent neuralgia. Take these medicines as directed.    Compresses made from a solution of cool water mixed with cornstarch or baking soda may help relieve pain and itching.     Gently wash skin daily with soap and water to help prevent infection. Be certain to rinse off all of the soap, which can be irritating.    Trim fingernails and try not to scratch. Scratching the sores may leave scars.    Stay home from work or school until all blisters have formed a crust and you are no longer contagious.  Follow-up care  Follow up with your healthcare provider, or as directed.  When to seek medical advice    Fever of 100.4 F (38 C) or higher, or as directed by your healthcare provider    Affected skin is on the face or neck and any of the following occur:  ? Headache  ? Eye pain  ? Changes in vision  ? Sores near the eye  ? Weakness of facial muscles    Blisters occurring on new areas of the body    Pain, redness, or swelling of a joint    Signs of skin infection: colored drainage from the sores, warmth, increasing redness, fever, or increasing pain  Date Last Reviewed: 4/1/2018 2000-2019 [employer]. [address] [place] [address], [place] [address]. All rights reserved. This information is not intended as a substitute for professional medical care. Always follow your healthcare professional's instructions.

## 2020-09-22 NOTE — PATIENT INSTRUCTIONS
Patient Education     Shingles  Shingles is a viral infection caused by the same virus that causes chicken pox. Anyone who has had chicken pox may get shingles later in life. The virus stays in the body, but remains asleep (dormant). Shingles often occurs in older persons or persons with lowered immunity. But it can affect anyone at any age.  Shingles starts as a tingling patch of skin on one side of the body. Small, painful blisters may then appear. The rash rarely spreads to other parts of the body.  Exposure to shingles can't cause shingles. However, it can cause chicken pox in anyone who has not had chicken pox or has not been vaccinated. The contagious period ends when all blisters have crusted over, generally 1 to 2 weeks after the illness starts.  After the blisters heal, the affected skin may be sensitive or painful for weeks or months, gradually resolving over time. But, sometimes this can last longer and be permanent (called postherpetic neuralgia.)  Shingles vaccines are available. Vaccination can help prevent shingles or make it less painful. It is generally recommended for adults older than 50, even if you've had singles in the past. Talk with your healthcare provider about when to get vaccinated and which vaccine is best for you.  Home care    Medicines may be prescribed to help relieve pain. Take these medicines as directed. Ask your healthcare provider or pharmacist before using over-the-counter medicines for helping treat pain and itching.    In certain cases, antiviral medicines may be prescribed to reduce pain, shorten the illness, and prevent neuralgia. Take these medicines as directed.    Compresses made from a solution of cool water mixed with cornstarch or baking soda may help relieve pain and itching.     Gently wash skin daily with soap and water to help prevent infection. Be certain to rinse off all of the soap, which can be irritating.    Trim fingernails and try not to scratch.  Scratching the sores may leave scars.    Stay home from work or school until all blisters have formed a crust and you are no longer contagious.  Follow-up care  Follow up with your healthcare provider, or as directed.  When to seek medical advice    Fever of 100.4 F (38 C) or higher, or as directed by your healthcare provider    Affected skin is on the face or neck and any of the following occur:  ? Headache  ? Eye pain  ? Changes in vision  ? Sores near the eye  ? Weakness of facial muscles    Blisters occurring on new areas of the body    Pain, redness, or swelling of a joint    Signs of skin infection: colored drainage from the sores, warmth, increasing redness, fever, or increasing pain  Date Last Reviewed: 4/1/2018 2000-2019 [employer]. [address] [place] [address], [place] [address]. All rights reserved. This information is not intended as a substitute for professional medical care. Always follow your healthcare professional's instructions.

## 2020-10-12 ENCOUNTER — OFFICE VISIT (OUTPATIENT)
Dept: URGENT CARE | Facility: URGENT CARE | Age: 50
End: 2020-10-12
Payer: COMMERCIAL

## 2020-10-12 VITALS
DIASTOLIC BLOOD PRESSURE: 71 MMHG | HEART RATE: 108 BPM | OXYGEN SATURATION: 80 % | BODY MASS INDEX: 22.85 KG/M2 | TEMPERATURE: 102.9 F | SYSTOLIC BLOOD PRESSURE: 130 MMHG | WEIGHT: 133.1 LBS

## 2020-10-12 DIAGNOSIS — R50.9 FEVER, UNSPECIFIED FEVER CAUSE: ICD-10-CM

## 2020-10-12 DIAGNOSIS — J96.01 ACUTE RESPIRATORY FAILURE WITH HYPOXIA (H): Primary | ICD-10-CM

## 2020-10-12 PROCEDURE — 99215 OFFICE O/P EST HI 40 MIN: CPT | Performed by: PHYSICIAN ASSISTANT

## 2020-10-12 ASSESSMENT — ENCOUNTER SYMPTOMS
RESPIRATORY NEGATIVE: 1
CHILLS: 0
NAUSEA: 0
WEAKNESS: 0
ABDOMINAL PAIN: 0
DIARRHEA: 0
COUGH: 0
WHEEZING: 0
SORE THROAT: 0
EYE REDNESS: 0
MYALGIAS: 0
CONSTITUTIONAL NEGATIVE: 1
ENDOCRINE NEGATIVE: 1
HEADACHES: 0
EYES NEGATIVE: 1
FEVER: 0
VOMITING: 0
NEUROLOGICAL NEGATIVE: 1
EYE ITCHING: 0
EYE DISCHARGE: 0
RHINORRHEA: 0
HEMATURIA: 0
DIZZINESS: 0
ADENOPATHY: 0
MUSCULOSKELETAL NEGATIVE: 1
CARDIOVASCULAR NEGATIVE: 1
FREQUENCY: 0
SHORTNESS OF BREATH: 0
POLYDIPSIA: 0
DIAPHORESIS: 0
CHEST TIGHTNESS: 0
GASTROINTESTINAL NEGATIVE: 1
PALPITATIONS: 0
LIGHT-HEADEDNESS: 0
DYSURIA: 0

## 2020-10-12 NOTE — PROGRESS NOTES
Chief Complaint:     Chief Complaint   Patient presents with     Fever       HPI: Martin Salvador is an 50 year old male who presents with aching and fever.  Symptoms began 2  days ago and has unchanged.  There is no shortness of breath, wheezing and chest pain.  He is eating and drinking well.  No diarrhea or vomiting.      Patient denies any recent travel or exposure to know COVID positive tested individual.  Patient is not a healthcare worker or .      ROS:     Review of Systems   Constitutional: Negative.  Negative for chills, diaphoresis and fever.   HENT: Negative.  Negative for congestion, ear pain, rhinorrhea and sore throat.    Eyes: Negative.  Negative for discharge, redness and itching.   Respiratory: Negative.  Negative for cough, chest tightness, shortness of breath and wheezing.    Cardiovascular: Negative.  Negative for chest pain and palpitations.   Gastrointestinal: Negative.  Negative for abdominal pain, diarrhea, nausea and vomiting.   Endocrine: Negative.  Negative for polydipsia and polyuria.   Genitourinary: Negative for dysuria, frequency, hematuria and urgency.   Musculoskeletal: Negative.  Negative for myalgias.   Skin: Negative for rash.   Allergic/Immunologic: Negative for immunocompromised state.   Neurological: Negative.  Negative for dizziness, weakness, light-headedness and headaches.   Hematological: Negative for adenopathy.        Respiratory History  occasional episodes of bronchitis       Family History   Family History   Problem Relation Age of Onset     Diabetes Mother         Problem history  Patient Active Problem List   Diagnosis     External hemorrhoids     Internal hemorrhoids with other complication     Pulmonary nodules     Bladder calculi     Impaired fasting glucose     BPH (benign prostatic hyperplasia)     Personal history of tobacco use, presenting hazards to health     Gastroesophageal reflux disease without esophagitis     Hyperlipidemia LDL goal <100         Allergies  No Known Allergies     Social History  Social History     Socioeconomic History     Marital status:      Spouse name: Lou     Number of children: 2     Years of education: Not on file     Highest education level: Not on file   Occupational History     Occupation:      Employer: OTHER   Social Needs     Financial resource strain: Not on file     Food insecurity     Worry: Not on file     Inability: Not on file     Transportation needs     Medical: Not on file     Non-medical: Not on file   Tobacco Use     Smoking status: Former Smoker     Packs/day: 1.00     Types: Cigarettes     Start date: 1984     Quit date: 2016     Years since quittin.7     Smokeless tobacco: Never Used   Substance and Sexual Activity     Alcohol use: Yes     Drug use: No     Sexual activity: Never   Lifestyle     Physical activity     Days per week: Not on file     Minutes per session: Not on file     Stress: Not on file   Relationships     Social connections     Talks on phone: Not on file     Gets together: Not on file     Attends Church service: Not on file     Active member of club or organization: Not on file     Attends meetings of clubs or organizations: Not on file     Relationship status: Not on file     Intimate partner violence     Fear of current or ex partner: Not on file     Emotionally abused: Not on file     Physically abused: Not on file     Forced sexual activity: Not on file   Other Topics Concern     Parent/sibling w/ CABG, MI or angioplasty before 65F 55M? Not Asked   Social History Narrative     Not on file        Current Meds    Current Outpatient Medications:      tamsulosin (FLOMAX) 0.4 MG capsule, Take 1 capsule (0.4 mg) by mouth daily, Disp: 30 capsule, Rfl: 0     atorvastatin (LIPITOR) 20 MG tablet, Take 1 tablet (20 mg) by mouth daily (Patient not taking: Reported on 2020), Disp: 90 tablet, Rfl: 1     finasteride (PROSCAR) 5 MG tablet, Take 1 tablet (5 mg) by  mouth daily (Patient not taking: Reported on 9/22/2020), Disp: 90 tablet, Rfl: 2     hydrocortisone 2.5 % cream, Apply BID to affected region(s) for 7-10 days. (Patient not taking: Reported on 9/22/2020), Disp: 30 g, Rfl: 0     ibuprofen (ADVIL/MOTRIN) 600 MG tablet, Take 1 tablet (600 mg) by mouth every 6 hours as needed for moderate pain (Patient not taking: Reported on 9/22/2020), Disp: 30 tablet, Rfl: 0     triamcinolone (KENALOG) 0.1 % paste, Take by mouth 2 times daily (Patient not taking: Reported on 7/30/2018), Disp: 5 g, Rfl: 1     valACYclovir (VALTREX) 1000 mg tablet, Take 1 tablet (1,000 mg) by mouth 3 times daily for 7 days, Disp: 21 tablet, Rfl: 0        OBJECTIVE     Vital signs reviewed by Michael Bean PA-C  /71 (BP Location: Left arm, Patient Position: Sitting, Cuff Size: Adult Regular)   Pulse 108   Temp 102.9  F (39.4  C) (Oral)   Wt 60.4 kg (133 lb 1.6 oz)   SpO2 (!) 80%   BMI 22.85 kg/m       Physical Exam  Vitals signs reviewed.   Constitutional:       General: He is not in acute distress.     Appearance: He is well-developed. He is not ill-appearing, toxic-appearing or diaphoretic.   HENT:      Head: Normocephalic and atraumatic.      Right Ear: Hearing, tympanic membrane, ear canal and external ear normal. No drainage, swelling or tenderness. Tympanic membrane is not perforated, erythematous, retracted or bulging.      Left Ear: Hearing, tympanic membrane, ear canal and external ear normal. No drainage, swelling or tenderness. Tympanic membrane is not perforated, erythematous, retracted or bulging.      Nose: Congestion present. No nasal tenderness, mucosal edema or rhinorrhea.      Right Turbinates: Not enlarged or swollen.      Left Turbinates: Not enlarged or swollen.      Right Sinus: No maxillary sinus tenderness or frontal sinus tenderness.      Left Sinus: No maxillary sinus tenderness or frontal sinus tenderness.      Mouth/Throat:      Pharynx: No pharyngeal swelling,  oropharyngeal exudate, posterior oropharyngeal erythema or uvula swelling.      Tonsils: No tonsillar exudate. 0 on the right. 0 on the left.   Eyes:      General: Lids are normal.         Right eye: No discharge.         Left eye: No discharge.      Conjunctiva/sclera: Conjunctivae normal.      Right eye: Right conjunctiva is not injected. No exudate.     Left eye: Left conjunctiva is not injected. No exudate.     Pupils: Pupils are equal, round, and reactive to light.   Neck:      Musculoskeletal: Normal range of motion and neck supple.   Cardiovascular:      Rate and Rhythm: Normal rate and regular rhythm.      Heart sounds: Normal heart sounds. No murmur. No friction rub. No gallop.    Pulmonary:      Effort: Pulmonary effort is normal. No accessory muscle usage, respiratory distress or retractions.      Breath sounds: Decreased air movement present. No stridor or transmitted upper airway sounds. Decreased breath sounds present. No wheezing, rhonchi or rales.   Chest:      Chest wall: No tenderness.   Abdominal:      General: Bowel sounds are normal. There is no distension.      Palpations: Abdomen is soft. Abdomen is not rigid. There is no mass.      Tenderness: There is no abdominal tenderness. There is no guarding or rebound.   Musculoskeletal: Normal range of motion.   Lymphadenopathy:      Head:      Right side of head: No submental, submandibular, tonsillar, preauricular or posterior auricular adenopathy.      Left side of head: No submental, submandibular, tonsillar, preauricular or posterior auricular adenopathy.      Cervical:      Right cervical: No superficial or posterior cervical adenopathy.     Left cervical: No superficial or posterior cervical adenopathy.   Skin:     General: Skin is warm.      Capillary Refill: Capillary refill takes less than 2 seconds.   Neurological:      Mental Status: He is alert and oriented to person, place, and time.      Cranial Nerves: No cranial nerve deficit.       Sensory: No sensory deficit.      Motor: No abnormal muscle tone.      Coordination: Coordination normal.      Deep Tendon Reflexes: Reflexes normal.   Psychiatric:         Behavior: Behavior normal. Behavior is cooperative.         Thought Content: Thought content normal.         Judgment: Judgment normal.           Labs:     No results found for any visits on 10/12/20.    Medical Decision Making:    Differential Diagnosis:  URI Adult/Peds:  Influenza, Pneumonia, Strep pharyngitis, Tonsilitis, Viral pharyngitis, Viral syndrome and Viral upper respiratory illness        ASSESSMENT    1. Acute respiratory failure with hypoxia (H)    2. Fever, unspecified fever cause        PLAN    Patient presents with 2 day(s) aching and fever.    Patient is in no acute distress.    Temp is 102.9 in clinic today, lung sounds were diminished, and O2 sats at 80% on RA.    With fever and hypoxia, patient instructed to go to the ED now for further evaluation, lab work, imaging, and admission.  Patient declined EMS transport.  Patient given COVID isolation instructions.  Patient verbalized understanding and agreed with this plan.    Droplet precautions were observed during this visit.  PPE was worn by me during the visit.  PPE included gown, double gloves, surgical mask, and face shield.  Vital signs were collected by me as well as any NP, or OP swabs if needed.      Michael Bean PA-C  10/12/2020, 3:28 PM

## 2020-10-23 ENCOUNTER — VIRTUAL VISIT (OUTPATIENT)
Dept: FAMILY MEDICINE | Facility: CLINIC | Age: 50
End: 2020-10-23
Payer: COMMERCIAL

## 2020-10-23 DIAGNOSIS — E87.1 HYPONATREMIA: ICD-10-CM

## 2020-10-23 DIAGNOSIS — R79.89 ELEVATED LFTS: ICD-10-CM

## 2020-10-23 DIAGNOSIS — J12.82 PNEUMONIA DUE TO 2019 NOVEL CORONAVIRUS: Primary | ICD-10-CM

## 2020-10-23 DIAGNOSIS — U07.1 PNEUMONIA DUE TO 2019 NOVEL CORONAVIRUS: Primary | ICD-10-CM

## 2020-10-23 PROCEDURE — 99213 OFFICE O/P EST LOW 20 MIN: CPT | Mod: 95 | Performed by: PHYSICIAN ASSISTANT

## 2020-10-23 NOTE — PROGRESS NOTES
"Martin Salvador is a 50 year old male who is being evaluated via a billable telephone visit.      The patient has been notified of following:     \"This telephone visit will be conducted via a call between you and your physician/provider. We have found that certain health care needs can be provided without the need for a physical exam.  This service lets us provide the care you need with a short phone conversation.  If a prescription is necessary we can send it directly to your pharmacy.  If lab work is needed we can place an order for that and you can then stop by our lab to have the test done at a later time.    Telephone visits are billed at different rates depending on your insurance coverage. During this emergency period, for some insurers they may be billed the same as an in-person visit.  Please reach out to your insurance provider with any questions.    If during the course of the call the physician/provider feels a telephone visit is not appropriate, you will not be charged for this service.\"    Patient has given verbal consent for Telephone visit?  Yes    What phone number would you like to be contacted at? [phone]    How would you like to obtain your AVS? Mail a copy    Subjective     Martin Salvador is a 50 year old male who presents via phone visit today for the following health issues:    HPI   Patient gave verbal consent for care everywhere given phone visit- care everywhere reviewed - treated with decadron, covid convalescent plasma and remdesivir.  He did require oxygen but no intubation   Phone visit today for Follow up visit after  visit and hospitalization for covid.  Breathing is better, tires easily. No fever   Was hospitalized at [facility] 10/12/2020 until 10/16/2020 for covid -19 pneumonia-and acute respiratory failure  Was shortness of breath -improved   Sent home with decadron which he has completed course    doesn't sleep very well.  Only sleeps 3-4 hours   Hasn't tried any " medications for sleep   I go to bathroom at night - on tamsulosin  Sent home with Dexamethasone 6 pills completed course   Did have elevated lfts during hospital stay but not 5 times normal limit  Did have some hyponatremia resolved prior to discharge   Was also treated with prophylactic enoxaparin during hospital stay       How are you feeling today? Better  In the past 24 hours have you had shortness of breath when speaking, walking, or climbing stairs? My breathing issues have improved  Do you have a cough? Yes, I have a cough but it's not worse  When is the last time you had a fever greater than 100? Didn't ask  Are you having any other symptoms? didn't ask    Do you have any other stressors you would like to discuss with your provider? No                               Review of Systems   Constitutional, HEENT, cardiovascular, pulmonary, gi and gu systems are negative, except as otherwise noted.       Objective          Vitals:  No vitals were obtained today due to virtual visit.    healthy, alert and no distress  PSYCH: Alert and oriented times 3; coherent speech, normal   rate and volume, able to articulate logical thoughts, able   to abstract reason, no tangential thoughts, no hallucinations   or delusions  His affect is normal and pleasant  RESP: No cough, no audible wheezing, able to talk in full sentences  Remainder of exam unable to be completed due to telephone visits            Assessment/Plan:    Assessment & Plan     Pneumonia due to 2019 novel coronavirus  Improving.  Referred to get well loop.  Warning signs and symptoms warranting urgent evaluation reviewed.   - COVID-19 [facility] Referral    Elevated LFTs  Encouraged to follow up with us in clinic in 2 weeks for recheck     Hyponatremia  Improved during hospital stay but likely needs repeat labs           Patient Instructions   Try over the counter melatonin or benadryl 25 mg at bedtime as needed for insomnia.  Follow up with Dr. Chakraborty in no  "more than 2 weeks to recheck lung exam, evaluate for improving pneumonia and repeat labs given that your liver function tests did go up during hospital stay  Someone will be contacting you to monitor your symptoms given covid  Continue to quarantine at home.   Return urgently if any change in symptoms like shortness of breath, chest pain , swelling in legs, or other change in symptoms.     Discharge Instructions for COVID-19 Patients  You have--or may have--COVID-19. Please follow the instructions listed below.   If you have a weakened immune system, discuss with your doctor any other actions you need to take.  How can I protect others?  If you have symptoms (fever, cough, body aches or trouble breathing):    Stay home and away from others (self-isolate) until:  ? At least 10 days have passed since your symptoms started, And   ? You've had no fever--and no medicine that reduces fever--for 1 full day (24 hours), And    ? Your other symptoms have resolved (gotten better).  If you don't show symptoms, but testing showed that you have COVID-19:    Stay home and away from others (self-isolate). Follow the tips under \"How do I self-isolate?\" below for 10 days (20 days if you have a weak immune system).    You don't need to be retested for COVID-19 before going back to school or work. As long as you're fever-free and feeling better, you can go back to school, work and other activities after waiting the 10 or 20 days.   How do I self-isolate?    Stay in your own room, even for meals. Use your own bathroom if you can.    Stay away from others in your home. No hugging, kissing or shaking hands. No visitors.    Don't go to work, school or anywhere else.    Clean \"high touch\" surfaces often (doorknobs, counters, handles). Use household cleaning spray or wipes. You'll find a full list of  on the EPA website: www.epa.gov/pesticide-registration/list-n-disinfectants-use-against-sars-cov-2.    Cover your mouth and nose with " a mask or other face covering to avoid spreading germs.    Wash your hands and face often. Use soap and water.    Caregivers in these groups are at risk for severe illness due to COVID-19:  ? People 65 years and older  ? People who live in a nursing home or long-term care facility  ? People with chronic disease (lung, heart, cancer, diabetes, kidney, liver, immunologic)  ? People who have a weakened immune system, including those who:    Are in cancer treatment    Take medicine that weakens the immune system, such as corticosteroids    Had a bone marrow or organ transplant    Have an immune deficiency    Have poorly controlled HIV or AIDS    Are obese (body mass index of 40 or higher)    Smoke regularly    Caregivers should wear gloves while washing dishes, handling laundry and cleaning bedrooms and bathrooms.    Use caution when washing and drying laundry: Don't shake dirty laundry and use the warmest water setting that you can.    For more tips on managing your health at home, go to www.cdc.gov/coronavirus/2019-ncov/downloads/10Things.pdf.  How can I take care of myself at home?  1. Get lots of rest. Drink extra fluids (unless a doctor has told you not to).    2. Take Tylenol (acetaminophen) for fever or pain. If you have liver or kidney problems, ask your family doctor if it's okay to take Tylenol.     Adults can take either:  ? 650 mg (two 325 mg pills) every 4 to 6 hours, or   ? 1,000 mg (two 500 mg pills) every 8 hours as needed.  ? Note: Don't take more than 3,000 mg in one day. Acetaminophen is found in many medicines (both prescribed and over-the-counter medicines). Read all labels to be sure you don't take too much.   For children, check the Tylenol bottle for the right dose. The dose is based on the child's age or weight.  3. If you have other health problems (like cancer, heart failure, an organ transplant or severe kidney disease): Call your specialty clinic if you don't feel better in the next 2  days.    4. Know when to call 911. Emergency warning signs include:  ? Trouble breathing or shortness of breath  ? Pain or pressure in the chest that doesn't go away  ? Feeling confused like you haven't felt before, or not being able to wake up  ? Bluish-colored lips or face    5. Your doctor may have prescribed a blood thinner medicine. Follow their instructions.  Where can I get more information?    [facility] - About COVID-19: [email or web address]    CDC - What to Do If You're Sick: www.cdc.gov/coronavirus/2019-ncov/about/steps-when-sick.html    CDC - Ending Home Isolation: www.cdc.gov/coronavirus/2019-ncov/hcp/disposition-in-home-patients.html    CDC - Caring for Someone: www.cdc.gov/coronavirus/2019-ncov/if-you-are-sick/care-for-someone.html    [facility] - Interim Guidance for Hospital Discharge to Home: www.health.[facility].[place]./diseases/coronavirus/hcp/hospdischarge.pdf    [facility] clinical trials (COVID-19 research studies): [email or web address]-clinical-trials    Below are the COVID-19 hotlines at the [place] Department of Health ([facility]). Interpreters are available.  ? For health questions: Call 041-839-0383 or 1-712.692.7756 (7 a.m. to 7 p.m.)  ? For questions about schools and childcare: Call 943-685-3406 or 1-409.294.2842 (7 a.m. to 7 p.m.)    For informational purposes only. Not to replace the advice of your health care provider. Clinically reviewed by the Infection Prevention Team. Copyright   2020 [employer]. All rights reserved. [employer] 366724 - REV 08/04/20.        Return in about 2 weeks (around 11/6/2020), or if symptoms worsen or fail to improve, for in person.    [facility]    Phone call duration:  9 minutes

## 2020-10-23 NOTE — Clinical Note
Please mail AVS   FYI for Dr. Chakraborty- encouraged follow up with you in approximately 2 weeks- consider comprehensive metabolic panel and chest xray??

## 2020-10-24 NOTE — PATIENT INSTRUCTIONS
"Try over the counter melatonin or benadryl 25 mg at bedtime as needed for insomnia.  Follow up with Dr. Chakraborty in no more than 2 weeks to recheck lung exam, evaluate for improving pneumonia and repeat labs given that your liver function tests did go up during hospital stay  Someone will be contacting you to monitor your symptoms given covid  Continue to quarantine at home.   Return urgently if any change in symptoms like shortness of breath, chest pain , swelling in legs, or other change in symptoms.     Discharge Instructions for COVID-19 Patients  You have--or may have--COVID-19. Please follow the instructions listed below.   If you have a weakened immune system, discuss with your doctor any other actions you need to take.  How can I protect others?  If you have symptoms (fever, cough, body aches or trouble breathing):    Stay home and away from others (self-isolate) until:  ? At least 10 days have passed since your symptoms started, And   ? You've had no fever--and no medicine that reduces fever--for 1 full day (24 hours), And    ? Your other symptoms have resolved (gotten better).  If you don't show symptoms, but testing showed that you have COVID-19:    Stay home and away from others (self-isolate). Follow the tips under \"How do I self-isolate?\" below for 10 days (20 days if you have a weak immune system).    You don't need to be retested for COVID-19 before going back to school or work. As long as you're fever-free and feeling better, you can go back to school, work and other activities after waiting the 10 or 20 days.   How do I self-isolate?    Stay in your own room, even for meals. Use your own bathroom if you can.    Stay away from others in your home. No hugging, kissing or shaking hands. No visitors.    Don't go to work, school or anywhere else.    Clean \"high touch\" surfaces often (doorknobs, counters, handles). Use household cleaning spray or wipes. You'll find a full list of  on the EPA " website: www.epa.gov/pesticide-registration/list-n-disinfectants-use-against-sars-cov-2.    Cover your mouth and nose with a mask or other face covering to avoid spreading germs.    Wash your hands and face often. Use soap and water.    Caregivers in these groups are at risk for severe illness due to COVID-19:  ? People 65 years and older  ? People who live in a nursing home or long-term care facility  ? People with chronic disease (lung, heart, cancer, diabetes, kidney, liver, immunologic)  ? People who have a weakened immune system, including those who:    Are in cancer treatment    Take medicine that weakens the immune system, such as corticosteroids    Had a bone marrow or organ transplant    Have an immune deficiency    Have poorly controlled HIV or AIDS    Are obese (body mass index of 40 or higher)    Smoke regularly    Caregivers should wear gloves while washing dishes, handling laundry and cleaning bedrooms and bathrooms.    Use caution when washing and drying laundry: Don't shake dirty laundry and use the warmest water setting that you can.    For more tips on managing your health at home, go to www.cdc.gov/coronavirus/2019-ncov/downloads/10Things.pdf.  How can I take care of myself at home?  1. Get lots of rest. Drink extra fluids (unless a doctor has told you not to).    2. Take Tylenol (acetaminophen) for fever or pain. If you have liver or kidney problems, ask your family doctor if it's okay to take Tylenol.     Adults can take either:  ? 650 mg (two 325 mg pills) every 4 to 6 hours, or   ? 1,000 mg (two 500 mg pills) every 8 hours as needed.  ? Note: Don't take more than 3,000 mg in one day. Acetaminophen is found in many medicines (both prescribed and over-the-counter medicines). Read all labels to be sure you don't take too much.   For children, check the Tylenol bottle for the right dose. The dose is based on the child's age or weight.  3. If you have other health problems (like cancer, heart  failure, an organ transplant or severe kidney disease): Call your specialty clinic if you don't feel better in the next 2 days.    4. Know when to call 911. Emergency warning signs include:  ? Trouble breathing or shortness of breath  ? Pain or pressure in the chest that doesn't go away  ? Feeling confused like you haven't felt before, or not being able to wake up  ? Bluish-colored lips or face    5. Your doctor may have prescribed a blood thinner medicine. Follow their instructions.  Where can I get more information?    [facility] - About COVID-19: [email or web address]    CDC - What to Do If You're Sick: www.cdc.gov/coronavirus/2019-ncov/about/steps-when-sick.html    CDC - Ending Home Isolation: www.cdc.gov/coronavirus/2019-ncov/hcp/disposition-in-home-patients.html    CDC - Caring for Someone: www.cdc.gov/coronavirus/2019-ncov/if-you-are-sick/care-for-someone.html    [facility] - Interim Guidance for Hospital Discharge to Home: www.[facility].[place]/diseases/coronavirus/hcp/hospdischarge.pdf    [facility] clinical trials (COVID-19 research studies): clinicalaffairs.[facility]-clinical-trials    Below are the COVID-19 hotlines at the [place] Department of Health ([facility]). Interpreters are available.  ? For health questions: Call 408-153-5871 or 1-830.961.4814 (7 a.m. to 7 p.m.)  ? For questions about schools and childcare: Call 954-665-1832 or 1-336.355.1194 (7 a.m. to 7 p.m.)    For informational purposes only. Not to replace the advice of your health care provider. Clinically reviewed by the Infection Prevention Team. Copyright   2020 [employer]. All rights reserved. [employer] 554608 - REV 08/04/20.

## 2020-11-02 ENCOUNTER — TELEPHONE (OUTPATIENT)
Dept: FAMILY MEDICINE | Facility: CLINIC | Age: 50
End: 2020-11-02

## 2020-11-02 NOTE — TELEPHONE ENCOUNTER
.Reason for call:  Other   Patient called regarding (reason for call): call back  Additional comments: pt is calling concerning his liver. He wants to know if anything is wrong with it.    Phone number to reach patient:  Home number on file [phone] (home)    Best Time:  anytime    Can we leave a detailed message on this number?  YES    Travel screening: Negative

## 2020-11-02 NOTE — TELEPHONE ENCOUNTER
Patient calling because he could not remember what provider said about his liver. Was there something wrong? He can't remember what he was supposed to do and he is wondering what he can do to make it lower. Is there a medicine?     Per virtual visit notes dated 10/23/20 patient is to follow up with clinic on or around 11/6/20 for recheck of elevated LFTs. I do see he has a future, face to face appointment on 11/17/20 with PCP.     Routing to provider to review and advise.     Rosibel Ramos RN  [facility]

## 2020-11-03 NOTE — TELEPHONE ENCOUNTER
Notified patient that he can have his liver function testing done at his next appointment with Dr. Chakraborty.   Next 5 appointments (look out 90 days)    Nov 17, 2020  2:20 PM  Office Visit with Purvi Chakraborty MD  [facility] ([facility]) [address] [place] [id number]  [phone]              Toño Saldaña RN, BSN, PHN

## 2020-11-27 ENCOUNTER — OFFICE VISIT (OUTPATIENT)
Dept: FAMILY MEDICINE | Facility: CLINIC | Age: 50
End: 2020-11-27
Payer: COMMERCIAL

## 2020-11-27 ENCOUNTER — ANCILLARY PROCEDURE (OUTPATIENT)
Dept: GENERAL RADIOLOGY | Facility: CLINIC | Age: 50
End: 2020-11-27
Attending: PREVENTIVE MEDICINE
Payer: COMMERCIAL

## 2020-11-27 VITALS
TEMPERATURE: 97.4 F | WEIGHT: 136 LBS | HEIGHT: 64 IN | DIASTOLIC BLOOD PRESSURE: 77 MMHG | OXYGEN SATURATION: 98 % | SYSTOLIC BLOOD PRESSURE: 111 MMHG | RESPIRATION RATE: 18 BRPM | HEART RATE: 78 BPM | BODY MASS INDEX: 23.22 KG/M2

## 2020-11-27 DIAGNOSIS — U07.1 PNEUMONIA DUE TO 2019 NOVEL CORONAVIRUS: ICD-10-CM

## 2020-11-27 DIAGNOSIS — J12.82 PNEUMONIA DUE TO 2019 NOVEL CORONAVIRUS: Primary | ICD-10-CM

## 2020-11-27 DIAGNOSIS — E78.5 HYPERLIPIDEMIA LDL GOAL <100: ICD-10-CM

## 2020-11-27 DIAGNOSIS — J12.82 PNEUMONIA DUE TO 2019 NOVEL CORONAVIRUS: ICD-10-CM

## 2020-11-27 DIAGNOSIS — R73.01 IMPAIRED FASTING GLUCOSE: ICD-10-CM

## 2020-11-27 DIAGNOSIS — U07.1 PNEUMONIA DUE TO 2019 NOVEL CORONAVIRUS: Primary | ICD-10-CM

## 2020-11-27 DIAGNOSIS — R79.89 ELEVATED LFTS: ICD-10-CM

## 2020-11-27 LAB
ALBUMIN SERPL-MCNC: 4 G/DL (ref 3.4–5)
ALP SERPL-CCNC: 90 U/L (ref 40–150)
ALT SERPL W P-5'-P-CCNC: 40 U/L (ref 0–70)
ANION GAP SERPL CALCULATED.3IONS-SCNC: 6 MMOL/L (ref 3–14)
AST SERPL W P-5'-P-CCNC: 21 U/L (ref 0–45)
BASOPHILS # BLD AUTO: 0.1 10E9/L (ref 0–0.2)
BASOPHILS NFR BLD AUTO: 0.8 %
BILIRUB SERPL-MCNC: 0.4 MG/DL (ref 0.2–1.3)
BUN SERPL-MCNC: 14 MG/DL (ref 7–30)
CALCIUM SERPL-MCNC: 9.3 MG/DL (ref 8.5–10.1)
CHLORIDE SERPL-SCNC: 102 MMOL/L (ref 94–109)
CHOLEST SERPL-MCNC: 345 MG/DL
CO2 SERPL-SCNC: 30 MMOL/L (ref 20–32)
CREAT SERPL-MCNC: 0.78 MG/DL (ref 0.66–1.25)
DIFFERENTIAL METHOD BLD: NORMAL
EOSINOPHIL # BLD AUTO: 0.1 10E9/L (ref 0–0.7)
EOSINOPHIL NFR BLD AUTO: 1.6 %
ERYTHROCYTE [DISTWIDTH] IN BLOOD BY AUTOMATED COUNT: 13.3 % (ref 10–15)
GFR SERPL CREATININE-BSD FRML MDRD: >90 ML/MIN/{1.73_M2}
GLUCOSE SERPL-MCNC: 123 MG/DL (ref 70–99)
HBA1C MFR BLD: 6.5 % (ref 0–5.6)
HCT VFR BLD AUTO: 47.2 % (ref 40–53)
HDLC SERPL-MCNC: 50 MG/DL
HGB BLD-MCNC: 15.8 G/DL (ref 13.3–17.7)
LDLC SERPL CALC-MCNC: 248 MG/DL
LYMPHOCYTES # BLD AUTO: 2.8 10E9/L (ref 0.8–5.3)
LYMPHOCYTES NFR BLD AUTO: 35.4 %
MCH RBC QN AUTO: 30.6 PG (ref 26.5–33)
MCHC RBC AUTO-ENTMCNC: 33.5 G/DL (ref 31.5–36.5)
MCV RBC AUTO: 91 FL (ref 78–100)
MONOCYTES # BLD AUTO: 0.5 10E9/L (ref 0–1.3)
MONOCYTES NFR BLD AUTO: 6.1 %
NEUTROPHILS # BLD AUTO: 4.4 10E9/L (ref 1.6–8.3)
NEUTROPHILS NFR BLD AUTO: 56.1 %
NONHDLC SERPL-MCNC: 295 MG/DL
PLATELET # BLD AUTO: 265 10E9/L (ref 150–450)
POTASSIUM SERPL-SCNC: 4.3 MMOL/L (ref 3.4–5.3)
PROT SERPL-MCNC: 8.2 G/DL (ref 6.8–8.8)
RBC # BLD AUTO: 5.17 10E12/L (ref 4.4–5.9)
SODIUM SERPL-SCNC: 138 MMOL/L (ref 133–144)
TRIGL SERPL-MCNC: 234 MG/DL
WBC # BLD AUTO: 7.9 10E9/L (ref 4–11)

## 2020-11-27 PROCEDURE — 85025 COMPLETE CBC W/AUTO DIFF WBC: CPT | Performed by: PREVENTIVE MEDICINE

## 2020-11-27 PROCEDURE — 71046 X-RAY EXAM CHEST 2 VIEWS: CPT | Performed by: RADIOLOGY

## 2020-11-27 PROCEDURE — 99214 OFFICE O/P EST MOD 30 MIN: CPT | Performed by: PREVENTIVE MEDICINE

## 2020-11-27 PROCEDURE — 80053 COMPREHEN METABOLIC PANEL: CPT | Performed by: PREVENTIVE MEDICINE

## 2020-11-27 PROCEDURE — 83036 HEMOGLOBIN GLYCOSYLATED A1C: CPT | Performed by: PREVENTIVE MEDICINE

## 2020-11-27 PROCEDURE — 80061 LIPID PANEL: CPT | Performed by: PREVENTIVE MEDICINE

## 2020-11-27 PROCEDURE — 36415 COLL VENOUS BLD VENIPUNCTURE: CPT | Performed by: PREVENTIVE MEDICINE

## 2020-11-27 RX ORDER — TAMSULOSIN HYDROCHLORIDE 0.4 MG/1
0.4 CAPSULE ORAL DAILY
COMMUNITY
Start: 2020-09-22 | End: 2021-05-24

## 2020-11-27 ASSESSMENT — PAIN SCALES - GENERAL: PAINLEVEL: NO PAIN (0)

## 2020-11-27 ASSESSMENT — MIFFLIN-ST. JEOR: SCORE: 1377.89

## 2020-11-27 NOTE — PROGRESS NOTES
Subjective     Martin Salvador is a 50 year old male who presents to clinic today for the following health issues:    HPI           Hospital Follow-up Visit:    Hospital/Nursing Home/IP Rehab Facility: [facility]  Date of Admission: 10/12/2020  Date of Discharge: 10/16/2020  Reason(s) for Admission: Acute respiratory failure with hypoxemia       Was your hospitalization related to COVID-19? YES   How are you feeling today? Much better  In the past 24 hours have you had shortness of breath when speaking, walking, or climbing stairs? I don't have breathing problems  Do you have a cough? I don't have a cough  When is the last time you had a fever greater than 100? Not since hsopitalization  Are you having any other symptoms? None   Do you have any other stressors you would like to discuss with your provider? No       Was the patient in the ICU or did the patient experience delirium during hospitalization?  No          Problems taking medications regularly:  None  Medication changes since discharge: None  Problems adhering to non-medication therapy:  None    Summary of hospitalization:  CareEverywhere information obtained and reviewed  Diagnostic Tests/Treatments reviewed.  Follow up needed: Liver function tests and chest X ray  Other Healthcare Providers Involved in Patient s Care:         None  Update since discharge: improved.       Post Discharge Medication Reconciliation: discharge medications reconciled and changed, per note/orders.  Plan of care communicated with patient                The following is a summary from Care Everywhere:    HOSPITAL COURSE:  Martin Salvador is a 50 y.o. male with history of hyperlipidemia, GERD, who presented to the emergency department with complaints of fever. He had a Covid-19 test in the emergency department which came back positive. He was diagnosed with Covid-19 viral pneumonia with acute respiratory failure requiring oxygen for hypoxemia. He was started on Decadron. He was  "also given COVID convalescent plasma and also remdesivir. He slowly improved and was able to wean off of oxygen. On 10/16/2020 he felt good and he wanted to go home. He was discharged in stable condition. He was given recommendations for self isolation at home as he had tested COVID positive. His hyponatremia resolved. He did not have any other complaints.      Review of Systems   Constitutional, HEENT, cardiovascular, pulmonary, gi and gu systems are negative, except as otherwise noted.      Objective    /77 (BP Location: Left arm, Patient Position: Chair, Cuff Size: Adult Regular)   Pulse 78   Temp 97.4  F (36.3  C) (Oral)   Resp 18   Ht 1.626 m (5' 4\")   Wt 61.7 kg (136 lb)   SpO2 98%   BMI 23.34 kg/m    Body mass index is 23.34 kg/m .  Physical Exam   GENERAL APPEARANCE: healthy, alert and no distress  EYES: Eyes grossly normal to inspection and conjunctivae and sclerae normal  HENT: nose and mouth without ulcers or lesions  NECK: no adenopathy and trachea midline and normal to palpation  RESP: lungs clear to auscultation - no rales, rhonchi or wheezes  CV: regular rates and rhythm, normal S1 S2, no S3 or S4 and no murmur, click or rub  ABDOMEN: soft, non-tender and no rebound or guarding   MS: extremities normal- no gross deformities noted and peripheral pulses normal  SKIN: no suspicious lesions or rashes  NEURO: Normal strength and tone, mentation intact and speech normal  PSYCH: mentation appears normal      Results for orders placed or performed in visit on 11/27/20 (from the past 24 hour(s))   CBC with platelets differential   Result Value Ref Range    WBC 7.9 4.0 - 11.0 10e9/L    RBC Count 5.17 4.4 - 5.9 10e12/L    Hemoglobin 15.8 13.3 - 17.7 g/dL    Hematocrit 47.2 40.0 - 53.0 %    MCV 91 78 - 100 fl    MCH 30.6 26.5 - 33.0 pg    MCHC 33.5 31.5 - 36.5 g/dL    RDW 13.3 10.0 - 15.0 %    Platelet Count 265 150 - 450 10e9/L    % Neutrophils 56.1 %    % Lymphocytes 35.4 %    % Monocytes 6.1 %    " % Eosinophils 1.6 %    % Basophils 0.8 %    Absolute Neutrophil 4.4 1.6 - 8.3 10e9/L    Absolute Lymphocytes 2.8 0.8 - 5.3 10e9/L    Absolute Monocytes 0.5 0.0 - 1.3 10e9/L    Absolute Eosinophils 0.1 0.0 - 0.7 10e9/L    Absolute Basophils 0.1 0.0 - 0.2 10e9/L    Diff Method Automated Method    Hemoglobin A1c   Result Value Ref Range    Hemoglobin A1C 6.5 (H) 0 - 5.6 %           Assessment & Plan     Martin was seen today for hospital f/u.    Diagnoses and all orders for this visit:    Pneumonia due to 2019 novel coronavirus  -     CBC with platelets differential  -     Comprehensive metabolic panel  -     XR Chest 2 Views; Future  -symptoms resolved    Elevated LFTs  -     Comprehensive metabolic panel  -await results of liver function tests   -ALT was 139, AST was 42 (10/16/2020)     Hyperlipidemia LDL goal <100  -     Lipid panel reflex to direct LDL Non-fasting  -used to be on Atorvastatin, stopped by patient.       The 10-year ASCVD risk score (Bing DC Jr., et al., 2013) is: 6.8%    Values used to calculate the score:      Age: 50 years      Sex: Male      Is Non- : No      Diabetic: No      Tobacco smoker: No      Systolic Blood Pressure: 111 mmHg      Is BP treated: No      HDL Cholesterol: 39 mg/dL      Total Cholesterol: 291 mg/dL      Impaired fasting glucose  -     Hemoglobin A1c  -HbA1C has increased from 6 to 6.5  -has been on Decadron recently for Covid  -Hence, will recheck in 3 months before labelling as having Type 2 diabetes.   -has been on Metformin previously for Impaired glucose               Return in about 3 months (around 2/27/2021) for labs.    Purvi Chakraborty MD MPH    [facility]

## 2020-11-27 NOTE — RESULT ENCOUNTER NOTE
Please send a letter:    Dear Martin Salvador,    Chest X ray is not showing any abnormalities.   Please let me know if you have any questions and thank you for choosing [facility].    Regards,    Purvi Chakraborty MD MPH

## 2020-12-01 DIAGNOSIS — E11.9 TYPE 2 DIABETES MELLITUS WITHOUT COMPLICATION, WITHOUT LONG-TERM CURRENT USE OF INSULIN (H): Primary | ICD-10-CM

## 2020-12-01 DIAGNOSIS — E78.5 HYPERLIPIDEMIA LDL GOAL <100: ICD-10-CM

## 2020-12-01 RX ORDER — ATORVASTATIN CALCIUM 20 MG/1
20 TABLET, FILM COATED ORAL DAILY
Qty: 90 TABLET | Refills: 0 | Status: SHIPPED | OUTPATIENT
Start: 2020-12-01 | End: 2020-12-23

## 2020-12-01 NOTE — RESULT ENCOUNTER NOTE
Please CALL patient:    Dear Martin Salvador,    LDL cholesterol is elevated at 248, should be less than 100. I would recommend cholesterol medication.   Three month glucose number is at 6.5 (right in the diabetic range). I would recommend using Metformin. Scripts have been sent to the pharmacy.  Basic blood count is not showing anemia or infection.  Electrolytes, kidney function and liver function tests are normal.  We will recheck labs in 3 months, orders are in the system, patient to call the clinic to make a lab only appointment.     Regards,    Purvi Chakraborty MD MPH

## 2020-12-03 ENCOUNTER — TELEPHONE (OUTPATIENT)
Dept: FAMILY MEDICINE | Facility: CLINIC | Age: 50
End: 2020-12-03

## 2020-12-03 NOTE — TELEPHONE ENCOUNTER
Martin called and notified of results and new medications to  at his pharmacy. Pt states he will call back and schedule his lab only appointment in March. Betty López MA on 12/2/2020 at 1:18 PM.       Per note on 12/2/2020 in lab section (labs done 12/1/20). Please mail patient letter with results.           Toño Saldaña RN, BSN, PHN

## 2020-12-03 NOTE — TELEPHONE ENCOUNTER
Reason for Call:  Request for results:    Name of test or procedure: TSH with free T4 reflex , Lipid panel reflex to direct LDL Non-fasting , Albumin Random Urine Quantitative, Hemoglobin A1c , and Basic metabolic panel    Date of test of procedure: 12/01/20    Location of the test or procedure: [facility]    OK to leave the result message on voice mail or with a family member? YES    Phone number Patient can be reached at:  Home number on file [phone] (home)    Additional comments: Pt calling for he came in on 12/01/20 for a lab appointment and would like a call back to discuss results.    Call taken on 12/3/2020 at 1:59 PM by Nick Leung

## 2020-12-03 NOTE — LETTER
December 4, 2020      Martin Salvador  [address]  [place] [address]            Dear Martin Salvador    LDL cholesterol is elevated at 248, should be less than 100. I would recommend cholesterol medication.   Three month glucose number is at 6.5 (right in the diabetic range). I would recommend using Metformin. Scripts have been sent to the pharmacy.  Basic blood count is not showing anemia or infection.  Electrolytes, kidney function and liver function tests are normal.  We will recheck labs in 3 months, orders are in the system, patient to call the clinic to make a lab only appointment.      Regards,     Purvi Chakraborty MD MPH

## 2020-12-04 NOTE — TELEPHONE ENCOUNTER
Sorry I am unable to find a note from 2/2/2020?  Sintia Brar MA  [facility]  2nd Floor  Primary Care

## 2020-12-04 NOTE — TELEPHONE ENCOUNTER
Printed and mailing letter to patient's home address.  Sintia Brar MA  [facility]  2nd Floor  Primary Care

## 2020-12-23 ENCOUNTER — OFFICE VISIT (OUTPATIENT)
Dept: FAMILY MEDICINE | Facility: CLINIC | Age: 50
End: 2020-12-23
Payer: COMMERCIAL

## 2020-12-23 VITALS
OXYGEN SATURATION: 99 % | DIASTOLIC BLOOD PRESSURE: 78 MMHG | WEIGHT: 136.4 LBS | SYSTOLIC BLOOD PRESSURE: 128 MMHG | HEIGHT: 64 IN | TEMPERATURE: 98.4 F | BODY MASS INDEX: 23.29 KG/M2 | HEART RATE: 74 BPM | RESPIRATION RATE: 18 BRPM

## 2020-12-23 DIAGNOSIS — Z23 ENCOUNTER FOR IMMUNIZATION: ICD-10-CM

## 2020-12-23 DIAGNOSIS — Z00.00 ROUTINE GENERAL MEDICAL EXAMINATION AT A HEALTH CARE FACILITY: Primary | ICD-10-CM

## 2020-12-23 DIAGNOSIS — E11.9 TYPE 2 DIABETES MELLITUS WITHOUT COMPLICATION, WITHOUT LONG-TERM CURRENT USE OF INSULIN (H): ICD-10-CM

## 2020-12-23 DIAGNOSIS — E78.5 HYPERLIPIDEMIA LDL GOAL <100: ICD-10-CM

## 2020-12-23 PROCEDURE — 99207 PR FOOT EXAM NO CHARGE: CPT | Mod: 25 | Performed by: FAMILY MEDICINE

## 2020-12-23 PROCEDURE — 90471 IMMUNIZATION ADMIN: CPT | Performed by: FAMILY MEDICINE

## 2020-12-23 PROCEDURE — 90750 HZV VACC RECOMBINANT IM: CPT | Performed by: FAMILY MEDICINE

## 2020-12-23 PROCEDURE — 99396 PREV VISIT EST AGE 40-64: CPT | Mod: 25 | Performed by: FAMILY MEDICINE

## 2020-12-23 RX ORDER — ATORVASTATIN CALCIUM 20 MG/1
20 TABLET, FILM COATED ORAL DAILY
Qty: 90 TABLET | Refills: 3 | Status: SHIPPED | OUTPATIENT
Start: 2020-12-23 | End: 2022-03-17

## 2020-12-23 ASSESSMENT — PAIN SCALES - GENERAL: PAINLEVEL: NO PAIN (0)

## 2020-12-23 ASSESSMENT — MIFFLIN-ST. JEOR: SCORE: 1383.68

## 2020-12-23 NOTE — PROGRESS NOTES
3  SUBJECTIVE:   CC: Martin Salvador is an 50 year old male who presents for preventive health visit.       Healthy Habits:    Do you get at least three servings of calcium containing foods daily (dairy, green leafy vegetables, etc.)? yes    Amount of exercise or daily activities, outside of work: 3-5 day(s) per week    Problems taking medications regularly No    Medication side effects: No    Have you had an eye exam in the past two years? yes    Do you see a dentist twice per year? yes    Do you have sleep apnea, excessive snoring or daytime drowsiness?no      Today's PHQ-2 Score:   PHQ-2 (  [employer]) 2020   Q1: Little interest or pleasure in doing things 0 0   Q2: Feeling down, depressed or hopeless 0 0   PHQ-2 Score 0 0       Abuse: Current or Past(Physical, Sexual or Emotional)- No  Do you feel safe in your environment? Yes        Social History     Tobacco Use     Smoking status: Former Smoker     Packs/day: 1.00     Types: Cigarettes     Start date: 1984     Quit date: 2016     Years since quittin.9     Smokeless tobacco: Never Used   Substance Use Topics     Alcohol use: Yes     If you drink alcohol do you typically have >3 drinks per day or >7 drinks per week? No                      Last PSA:   PSA   Date Value Ref Range Status   2019 1.32 0 - 4 ug/L Final     Comment:     Assay Method:  Chemiluminescence using Siemens Vista analyzer       Reviewed orders with patient. Reviewed health maintenance and updated orders accordingly - Yes  Lab work is in process  Labs reviewed in EPIC  BP Readings from Last 3 Encounters:   20 128/78   20 111/77   10/12/20 130/71    Wt Readings from Last 3 Encounters:   20 61.9 kg (136 lb 6.4 oz)   20 61.7 kg (136 lb)   10/12/20 60.4 kg (133 lb 1.6 oz)                  Patient Active Problem List   Diagnosis     External hemorrhoids     Internal hemorrhoids with other complication     Pulmonary nodules     Bladder calculi      Impaired fasting glucose     BPH (benign prostatic hyperplasia)     Personal history of tobacco use, presenting hazards to health     Gastroesophageal reflux disease without esophagitis     Hyperlipidemia LDL goal <100     Diabetes mellitus, type 2 (H)     Past Surgical History:   Procedure Laterality Date     LIGATION OF HEMORRHOID(S)         Social History     Tobacco Use     Smoking status: Former Smoker     Packs/day: 1.00     Types: Cigarettes     Start date: 1984     Quit date: 2016     Years since quittin.9     Smokeless tobacco: Never Used   Substance Use Topics     Alcohol use: Yes     Family History   Problem Relation Age of Onset     Diabetes Mother          Current Outpatient Medications   Medication Sig Dispense Refill     aspirin (ASA) 81 MG EC tablet Take 1 tablet (81 mg) by mouth daily 90 tablet 3     atorvastatin (LIPITOR) 20 MG tablet Take 1 tablet (20 mg) by mouth daily 90 tablet 3     metFORMIN (GLUCOPHAGE) 500 MG tablet Take 1 tablet (500 mg) by mouth 2 times daily (with meals) 180 tablet 3     tamsulosin (FLOMAX) 0.4 MG capsule Take 0.4 mg by mouth daily       No Known Allergies  Recent Labs   Lab Test 20  1147 19  1515 18  1014 18  1603 18  1912 12/24/15  0815 12/24/15  0815   A1C 6.5* 6.0*  --  6.0* 6.2*   < >  --    * Cannot estimate LDL when triglyceride exceeds 400 mg/dL 102* 53 188*   < > 171*   HDL 50 39* 38* 34*  --    < > 47   TRIG 234* 670* 249* 369*  --    < > 134   ALT 40  --   --  61  --   --  36   CR 0.78  --   --   --  0.64*  --  0.77   GFRESTIMATED >90  --   --   --  >90  --  >90  Non  GFR Calc     GFRESTBLACK >90  --   --   --  >90  --  >90  African American GFR Calc     POTASSIUM 4.3  --   --   --  4.1  --  4.2    < > = values in this interval not displayed.        Reviewed and updated as needed this visit by clinical staff                 Reviewed and updated as needed this visit by Provider               "      ROS:  CONSTITUTIONAL: NEGATIVE for fever, chills, change in weight  INTEGUMENTARY/SKIN: NEGATIVE for worrisome rashes, moles or lesions  EYES: NEGATIVE for vision changes or irritation  ENT: NEGATIVE for ear, mouth and throat problems  RESP: NEGATIVE for significant cough or SOB  CV: NEGATIVE for chest pain, palpitations or peripheral edema  GI: NEGATIVE for nausea, abdominal pain, heartburn, or change in bowel habits   male: negative for dysuria, hematuria, decreased urinary stream, erectile dysfunction, urethral discharge  MUSCULOSKELETAL: NEGATIVE for significant arthralgias or myalgia  NEURO: NEGATIVE for weakness, dizziness or paresthesias  PSYCHIATRIC: NEGATIVE for changes in mood or affect    OBJECTIVE:   /78 (BP Location: Right arm, Patient Position: Sitting, Cuff Size: Adult Regular)   Pulse 74   Temp 98.4  F (36.9  C) (Oral)   Resp 18   Ht 1.632 m (5' 4.25\")   Wt 61.9 kg (136 lb 6.4 oz)   SpO2 99%   BMI 23.23 kg/m    EXAM:  GENERAL: healthy, alert and no distress  NECK: no adenopathy, no asymmetry, masses, or scars and thyroid normal to palpation  RESP: lungs clear to auscultation - no rales, rhonchi or wheezes  CV: regular rate and rhythm, normal S1 S2, no S3 or S4, no murmur, click or rub, no peripheral edema and peripheral pulses strong  ABDOMEN: soft, nontender, no hepatosplenomegaly, no masses and bowel sounds normal  MS: no gross musculoskeletal defects noted, no edema  Diabetic foot exam: normal DP and PT pulses, no trophic changes or ulcerative lesions and normal sensory exam    Diagnostic Test Results:  Labs reviewed in Epic    ASSESSMENT/PLAN:   1. Routine general medical examination at a health care facility  As below.    2. Type 2 diabetes mellitus without complication, without long-term current use of insulin (H)  Recheck while on metformin. Adjust dose if needed. RTC in 3 months.  - metFORMIN (GLUCOPHAGE) 500 MG tablet; Take 1 tablet (500 mg) by mouth 2 times daily (with " "meals)  Dispense: 180 tablet; Refill: 3  - aspirin (ASA) 81 MG EC tablet; Take 1 tablet (81 mg) by mouth daily  Dispense: 90 tablet; Refill: 3  - Hemoglobin A1c; Future  - Basic metabolic panel  (Ca, Cl, CO2, Creat, Gluc, K, Na, BUN); Future  - Albumin Random Urine Quantitative with Creat Ratio; Future  - TSH with free T4 reflex; Future  - FOOT EXAM    3. Hyperlipidemia LDL goal <100  Recheck while on atorvastatin. Adjust dose if needed.  - atorvastatin (LIPITOR) 20 MG tablet; Take 1 tablet (20 mg) by mouth daily  Dispense: 90 tablet; Refill: 3  - Lipid panel reflex to direct LDL Fasting; Future    4. Encounter for immunization    - ZOSTER VACCINE RECOMBINANT ADJUVANTED IM NJX  - ADMIN 1st VACCINE    Patient has been advised of split billing requirements and indicates understanding: Yes  COUNSELING:  Reviewed preventive health counseling, as reflected in patient instructions       Regular exercise       Healthy diet/nutrition       Vision screening    Estimated body mass index is 23.34 kg/m  as calculated from the following:    Height as of 11/27/20: 1.626 m (5' 4\").    Weight as of 11/27/20: 61.7 kg (136 lb).        He reports that he quit smoking about 4 years ago. His smoking use included cigarettes. He started smoking about 37 years ago. He smoked 1.00 pack per day. He has never used smokeless tobacco.      Counseling Resources:  ATP IV Guidelines  Pooled Cohorts Equation Calculator  FRAX Risk Assessment  ICSI Preventive Guidelines  Dietary Guidelines for Americans, 2010  USDA's MyPlate  ASA Prophylaxis  Lung CA Screening    Tyler Hollins MD, MD  [facility]  "

## 2020-12-23 NOTE — NURSING NOTE
Prior to immunization administration, verified patients identity using patient s name and date of birth. Please see Immunization Activity for additional information.     Screening Questionnaire for Adult Immunization    Are you sick today?   No   Do you have allergies to medications, food, a vaccine component or latex?   No   Have you ever had a serious reaction after receiving a vaccination?   No   Do you have a long-term health problem with heart, lung, kidney, or metabolic disease (e.g., diabetes), asthma, a blood disorder, no spleen, complement component deficiency, a cochlear implant, or a spinal fluid leak?  Are you on long-term aspirin therapy?   No   Do you have cancer, leukemia, HIV/AIDS, or any other immune system problem?   No   Do you have a parent, brother, or sister with an immune system problem?   No   In the past 3 months, have you taken medications that affect  your immune system, such as prednisone, other steroids, or anticancer drugs; drugs for the treatment of rheumatoid arthritis, Crohn s disease, or psoriasis; or have you had radiation treatments?   No   Have you had a seizure, or a brain or other nervous system problem?   No   During the past year, have you received a transfusion of blood or blood    products, or been given immune (gamma) globulin or antiviral drug?   No   For women: Are you pregnant or is there a chance you could become       pregnant during the next month?   No   Have you received any vaccinations in the past 4 weeks?   No     Immunization questionnaire answers were all negative.         Patient instructed to remain in clinic for 15 minutes afterwards, and to report any adverse reaction to me immediately.       Screening performed by Lisandro Owens MA on 12/23/2020 at 3:02 PM.

## 2021-02-17 DIAGNOSIS — E78.5 HYPERLIPIDEMIA LDL GOAL <100: ICD-10-CM

## 2021-02-17 DIAGNOSIS — E11.9 TYPE 2 DIABETES MELLITUS WITHOUT COMPLICATION, WITHOUT LONG-TERM CURRENT USE OF INSULIN (H): ICD-10-CM

## 2021-02-17 LAB
ANION GAP SERPL CALCULATED.3IONS-SCNC: 5 MMOL/L (ref 3–14)
BUN SERPL-MCNC: 17 MG/DL (ref 7–30)
CALCIUM SERPL-MCNC: 9.5 MG/DL (ref 8.5–10.1)
CHLORIDE SERPL-SCNC: 104 MMOL/L (ref 94–109)
CHOLEST SERPL-MCNC: 155 MG/DL
CO2 SERPL-SCNC: 30 MMOL/L (ref 20–32)
CREAT SERPL-MCNC: 0.86 MG/DL (ref 0.66–1.25)
CREAT UR-MCNC: 96 MG/DL
GFR SERPL CREATININE-BSD FRML MDRD: >90 ML/MIN/{1.73_M2}
GLUCOSE SERPL-MCNC: 110 MG/DL (ref 70–99)
HBA1C MFR BLD: 6.1 % (ref 0–5.6)
HDLC SERPL-MCNC: 53 MG/DL
LDLC SERPL CALC-MCNC: 71 MG/DL
MICROALBUMIN UR-MCNC: <5 MG/L
MICROALBUMIN/CREAT UR: NORMAL MG/G CR (ref 0–17)
NONHDLC SERPL-MCNC: 102 MG/DL
POTASSIUM SERPL-SCNC: 4.1 MMOL/L (ref 3.4–5.3)
SODIUM SERPL-SCNC: 139 MMOL/L (ref 133–144)
TRIGL SERPL-MCNC: 156 MG/DL
TSH SERPL DL<=0.005 MIU/L-ACNC: 3.92 MU/L (ref 0.4–4)

## 2021-02-17 PROCEDURE — 80048 BASIC METABOLIC PNL TOTAL CA: CPT | Performed by: FAMILY MEDICINE

## 2021-02-17 PROCEDURE — 80061 LIPID PANEL: CPT | Performed by: FAMILY MEDICINE

## 2021-02-17 PROCEDURE — 83036 HEMOGLOBIN GLYCOSYLATED A1C: CPT | Performed by: FAMILY MEDICINE

## 2021-02-17 PROCEDURE — 82043 UR ALBUMIN QUANTITATIVE: CPT | Performed by: FAMILY MEDICINE

## 2021-02-17 PROCEDURE — 84443 ASSAY THYROID STIM HORMONE: CPT | Performed by: FAMILY MEDICINE

## 2021-02-17 PROCEDURE — 36415 COLL VENOUS BLD VENIPUNCTURE: CPT | Performed by: FAMILY MEDICINE

## 2021-04-06 ENCOUNTER — VIRTUAL VISIT (OUTPATIENT)
Dept: FAMILY MEDICINE | Facility: CLINIC | Age: 51
End: 2021-04-06
Payer: COMMERCIAL

## 2021-04-06 DIAGNOSIS — K21.9 GASTROESOPHAGEAL REFLUX DISEASE WITHOUT ESOPHAGITIS: ICD-10-CM

## 2021-04-06 DIAGNOSIS — E11.9 TYPE 2 DIABETES MELLITUS WITHOUT COMPLICATION, WITHOUT LONG-TERM CURRENT USE OF INSULIN (H): Primary | ICD-10-CM

## 2021-04-06 DIAGNOSIS — E78.5 HYPERLIPIDEMIA LDL GOAL <100: ICD-10-CM

## 2021-04-06 PROCEDURE — 99214 OFFICE O/P EST MOD 30 MIN: CPT | Mod: 95 | Performed by: PREVENTIVE MEDICINE

## 2021-04-06 NOTE — PROGRESS NOTES
Martin is a 51 year old who is being evaluated via a billable telephone visit.      What phone number would you like to be contacted at? [phone]  How would you like to obtain your AVS? Mail a copy    Assessment & Plan     Type 2 diabetes mellitus without complication, without long-term current use of insulin (H)  -on Metfomin  -One HbA1C was 6.5 back in 11/2020, otherwise all readings have been in the impaired range     Continue medications the same.    Periodic blood sugar testing.  Regular foot checks  Limit carbohydrates and sweets in diet  Update eye exam annually   Regular exercise, 150 minutes per week  Hypoglycemia precautions       Hyperlipidemia LDL goal <100  -continue statin     The 10-year ASCVD risk score (Bingdiandra TOBIAS Jr., et al., 2013) is: 6.1%    Values used to calculate the score:      Age: 51 years      Sex: Male      Is Non- : No      Diabetic: Yes      Tobacco smoker: No      Systolic Blood Pressure: 128 mmHg      Is BP treated: No      HDL Cholesterol: 53 mg/dL      Total Cholesterol: 155 mg/dL    Gastroesophageal reflux disease without esophagitis  -Teeth changes noted by dentist  -if not better in 1 month then consider Endoscopy   - omeprazole (PRILOSEC) 20 MG DR capsule  Dispense: 30 capsule; Refill: 1    20  minutes spent on the date of the encounter doing chart review, history and exam, documentation and further activities per the note         Return in about 4 weeks (around 5/4/2021) if symptoms worsen or fail to improve.    Purvi Chakraborty MD MPH    [facility]    Ashley Salazar is a 51 year old who presents for the following health issues :  HPI     GERD/Heartburn  Onset/Duration: Intermittent for some time   Description: Patient states his dentist says he had reflux due to the changes on his teeth   Intensity: moderate  Progression of Symptoms: same  Accompanying Signs & Symptoms:  Does it feel like food gets stuck or trouble  swallowing: no  Nausea: no  Vomiting (bloody?): no  Abdominal Pain: no  Black-Tarry stools: no  Bloody stools: no  History:  Previous similar episodes: no  Previous ulcers: no  Precipitating factors:   Caffeine use: YES, one cup  Alcohol use: YES, occasional use one drink a week   NSAID/Aspirin use: Baby Aspirin   Tobacco use: no  Worse with no particular food or drink.  Alleviating factors: None  Therapies tried and outcome:             Lifestyle changes: None            Medications: none  Has had symptoms for some time  Teeth have been worn out at the back per dentist  Sometimes has heartburn     Diabetes Follow-up      How often are you checking your blood sugar? Not at all    What concerns do you have today about your diabetes? None     Do you have any of these symptoms? (Select all that apply)  No numbness or tingling in feet.  No redness, sores or blisters on feet.  No complaints of excessive thirst.  No reports of blurry vision.  No significant changes to weight.  Have you had a diabetic eye exam in the last 12 months? No     Nocturia 2 times       BP Readings from Last 2 Encounters:   12/23/20 128/78   11/27/20 111/77     Hemoglobin A1C (%)   Date Value   02/17/2021 6.1 (H)   11/27/2020 6.5 (H)     LDL Cholesterol Calculated (mg/dL)   Date Value   02/17/2021 71   11/27/2020 248 (H)               Hyperlipidemia Follow-Up      Are you regularly taking any medication or supplement to lower your cholesterol?   Yes- statin    Are you having muscle aches or other side effects that you think could be caused by your cholesterol lowering medication?  No      Review of Systems   Constitutional, HEENT, cardiovascular, pulmonary, gi and gu systems are negative, except as otherwise noted.      Objective           Vitals:  No vitals were obtained today due to virtual visit.    Physical Exam   healthy, alert and no distress  PSYCH: Alert and oriented times 3; coherent speech, normal   rate and volume, able to articulate  logical thoughts, able   to abstract reason, no tangential thoughts, no hallucinations   or delusions  His affect is normal  RESP: No cough, no audible wheezing, able to talk in full sentences  Remainder of exam unable to be completed due to telephone visits          Phone call duration: 11 minutes

## 2021-05-22 DIAGNOSIS — N40.1 BENIGN PROSTATIC HYPERPLASIA WITH URINARY FREQUENCY: Primary | ICD-10-CM

## 2021-05-22 DIAGNOSIS — R35.0 BENIGN PROSTATIC HYPERPLASIA WITH URINARY FREQUENCY: Primary | ICD-10-CM

## 2021-05-24 RX ORDER — TAMSULOSIN HYDROCHLORIDE 0.4 MG/1
CAPSULE ORAL
Qty: 90 CAPSULE | Refills: 0 | Status: SHIPPED | OUTPATIENT
Start: 2021-05-24 | End: 2023-03-17

## 2021-05-24 NOTE — TELEPHONE ENCOUNTER
Routing refill request to provider for review/approval because:  Medication is reported/historical  Johana Bennett RN

## 2022-03-17 DIAGNOSIS — E78.5 HYPERLIPIDEMIA LDL GOAL <100: ICD-10-CM

## 2022-03-17 RX ORDER — ATORVASTATIN CALCIUM 20 MG/1
20 TABLET, FILM COATED ORAL DAILY
Qty: 90 TABLET | Refills: 0 | Status: SHIPPED | OUTPATIENT
Start: 2022-03-17 | End: 2022-07-06

## 2022-03-17 NOTE — TELEPHONE ENCOUNTER
Routing refill request to provider for review/approval because:  Labs not current:  LES GUPTAN, RN

## 2022-07-06 ENCOUNTER — OFFICE VISIT (OUTPATIENT)
Dept: FAMILY MEDICINE | Facility: CLINIC | Age: 52
End: 2022-07-06
Payer: COMMERCIAL

## 2022-07-06 VITALS
HEIGHT: 64 IN | HEART RATE: 74 BPM | TEMPERATURE: 97.7 F | RESPIRATION RATE: 18 BRPM | DIASTOLIC BLOOD PRESSURE: 64 MMHG | BODY MASS INDEX: 21.85 KG/M2 | WEIGHT: 128 LBS | SYSTOLIC BLOOD PRESSURE: 116 MMHG | OXYGEN SATURATION: 98 %

## 2022-07-06 DIAGNOSIS — Z23 ENCOUNTER FOR ADMINISTRATION OF VACCINE: ICD-10-CM

## 2022-07-06 DIAGNOSIS — E78.5 HYPERLIPIDEMIA LDL GOAL <100: ICD-10-CM

## 2022-07-06 DIAGNOSIS — Z87.891 PERSONAL HISTORY OF TOBACCO USE: ICD-10-CM

## 2022-07-06 DIAGNOSIS — Z11.59 NEED FOR HEPATITIS C SCREENING TEST: ICD-10-CM

## 2022-07-06 DIAGNOSIS — E11.9 TYPE 2 DIABETES MELLITUS WITHOUT COMPLICATION, WITHOUT LONG-TERM CURRENT USE OF INSULIN (H): Primary | ICD-10-CM

## 2022-07-06 DIAGNOSIS — Z12.11 SCREEN FOR COLON CANCER: ICD-10-CM

## 2022-07-06 DIAGNOSIS — K21.9 GASTROESOPHAGEAL REFLUX DISEASE WITHOUT ESOPHAGITIS: ICD-10-CM

## 2022-07-06 LAB
ANION GAP SERPL CALCULATED.3IONS-SCNC: 3 MMOL/L (ref 3–14)
BUN SERPL-MCNC: 24 MG/DL (ref 7–30)
CALCIUM SERPL-MCNC: 9.1 MG/DL (ref 8.5–10.1)
CHLORIDE BLD-SCNC: 107 MMOL/L (ref 94–109)
CHOLEST SERPL-MCNC: 101 MG/DL
CO2 SERPL-SCNC: 28 MMOL/L (ref 20–32)
CREAT SERPL-MCNC: 0.73 MG/DL (ref 0.66–1.25)
CREAT UR-MCNC: 162 MG/DL
FASTING STATUS PATIENT QL REPORTED: YES
GFR SERPL CREATININE-BSD FRML MDRD: >90 ML/MIN/1.73M2
GLUCOSE BLD-MCNC: 119 MG/DL (ref 70–99)
HBA1C MFR BLD: 5.9 % (ref 0–5.6)
HDLC SERPL-MCNC: 46 MG/DL
LDLC SERPL CALC-MCNC: 36 MG/DL
MICROALBUMIN UR-MCNC: 10 MG/L
MICROALBUMIN/CREAT UR: 6.17 MG/G CR (ref 0–17)
NONHDLC SERPL-MCNC: 55 MG/DL
POTASSIUM BLD-SCNC: 5 MMOL/L (ref 3.4–5.3)
SODIUM SERPL-SCNC: 138 MMOL/L (ref 133–144)
TRIGL SERPL-MCNC: 93 MG/DL

## 2022-07-06 PROCEDURE — 80061 LIPID PANEL: CPT | Performed by: STUDENT IN AN ORGANIZED HEALTH CARE EDUCATION/TRAINING PROGRAM

## 2022-07-06 PROCEDURE — 90471 IMMUNIZATION ADMIN: CPT | Performed by: STUDENT IN AN ORGANIZED HEALTH CARE EDUCATION/TRAINING PROGRAM

## 2022-07-06 PROCEDURE — 86803 HEPATITIS C AB TEST: CPT | Performed by: STUDENT IN AN ORGANIZED HEALTH CARE EDUCATION/TRAINING PROGRAM

## 2022-07-06 PROCEDURE — 80048 BASIC METABOLIC PNL TOTAL CA: CPT | Performed by: STUDENT IN AN ORGANIZED HEALTH CARE EDUCATION/TRAINING PROGRAM

## 2022-07-06 PROCEDURE — 36415 COLL VENOUS BLD VENIPUNCTURE: CPT | Performed by: STUDENT IN AN ORGANIZED HEALTH CARE EDUCATION/TRAINING PROGRAM

## 2022-07-06 PROCEDURE — G0296 VISIT TO DETERM LDCT ELIG: HCPCS | Performed by: STUDENT IN AN ORGANIZED HEALTH CARE EDUCATION/TRAINING PROGRAM

## 2022-07-06 PROCEDURE — 83036 HEMOGLOBIN GLYCOSYLATED A1C: CPT | Performed by: STUDENT IN AN ORGANIZED HEALTH CARE EDUCATION/TRAINING PROGRAM

## 2022-07-06 PROCEDURE — 99214 OFFICE O/P EST MOD 30 MIN: CPT | Mod: 25 | Performed by: STUDENT IN AN ORGANIZED HEALTH CARE EDUCATION/TRAINING PROGRAM

## 2022-07-06 PROCEDURE — 96127 BRIEF EMOTIONAL/BEHAV ASSMT: CPT | Performed by: STUDENT IN AN ORGANIZED HEALTH CARE EDUCATION/TRAINING PROGRAM

## 2022-07-06 PROCEDURE — 99207 PR FOOT EXAM NO CHARGE: CPT | Performed by: STUDENT IN AN ORGANIZED HEALTH CARE EDUCATION/TRAINING PROGRAM

## 2022-07-06 PROCEDURE — 82043 UR ALBUMIN QUANTITATIVE: CPT | Performed by: STUDENT IN AN ORGANIZED HEALTH CARE EDUCATION/TRAINING PROGRAM

## 2022-07-06 PROCEDURE — 90677 PCV20 VACCINE IM: CPT | Performed by: STUDENT IN AN ORGANIZED HEALTH CARE EDUCATION/TRAINING PROGRAM

## 2022-07-06 RX ORDER — ATORVASTATIN CALCIUM 20 MG/1
20 TABLET, FILM COATED ORAL DAILY
Qty: 90 TABLET | Refills: 1 | Status: SHIPPED | OUTPATIENT
Start: 2022-07-06 | End: 2023-03-17

## 2022-07-06 RX ORDER — ATORVASTATIN CALCIUM 20 MG/1
20 TABLET, FILM COATED ORAL DAILY
Qty: 90 TABLET | Refills: 0 | Status: SHIPPED | OUTPATIENT
Start: 2022-07-06 | End: 2022-07-06

## 2022-07-06 ASSESSMENT — PAIN SCALES - GENERAL: PAINLEVEL: NO PAIN (0)

## 2022-07-06 ASSESSMENT — PATIENT HEALTH QUESTIONNAIRE - PHQ9
SUM OF ALL RESPONSES TO PHQ QUESTIONS 1-9: 1
10. IF YOU CHECKED OFF ANY PROBLEMS, HOW DIFFICULT HAVE THESE PROBLEMS MADE IT FOR YOU TO DO YOUR WORK, TAKE CARE OF THINGS AT HOME, OR GET ALONG WITH OTHER PEOPLE: NOT DIFFICULT AT ALL
SUM OF ALL RESPONSES TO PHQ QUESTIONS 1-9: 1

## 2022-07-06 NOTE — LETTER
July 8, 2022      Martin C Madeleine  [address] [place] [facility] [place] [address]        Dear ,    We are writing to inform you of your test results.    Your results are normal.    Resulted Orders   Albumin Random Urine Quantitative with Creat Ratio   Result Value Ref Range    Creatinine Urine mg/dL 162 mg/dL    Albumin Urine mg/L 10 mg/L    Albumin Urine mg/g Cr 6.17 0.00 - 17.00 mg/g Cr   Lipid panel reflex to direct LDL Fasting   Result Value Ref Range    Cholesterol 101 <200 mg/dL    Triglycerides 93 <150 mg/dL    Direct Measure HDL 46 >=40 mg/dL    LDL Cholesterol Calculated 36 <=100 mg/dL    Non HDL Cholesterol 55 <130 mg/dL    Patient Fasting > 8hrs? Yes     Narrative    Cholesterol  Desirable:  <200 mg/dL    Triglycerides  Normal:  Less than 150 mg/dL  Borderline High:  150-199 mg/dL  High:  200-499 mg/dL  Very High:  Greater than or equal to 500 mg/dL    Direct Measure HDL  Female:  Greater than or equal to 50 mg/dL   Male:  Greater than or equal to 40 mg/dL    LDL Cholesterol  Desirable:  <100mg/dL  Above Desirable:  100-129 mg/dL   Borderline High:  130-159 mg/dL   High:  160-189 mg/dL   Very High:  >= 190 mg/dL    Non HDL Cholesterol  Desirable:  130 mg/dL  Above Desirable:  130-159 mg/dL  Borderline High:  160-189 mg/dL  High:  190-219 mg/dL  Very High:  Greater than or equal to 220 mg/dL   BASIC METABOLIC PANEL   Result Value Ref Range    Sodium 138 133 - 144 mmol/L    Potassium 5.0 3.4 - 5.3 mmol/L    Chloride 107 94 - 109 mmol/L    Carbon Dioxide (CO2) 28 20 - 32 mmol/L    Anion Gap 3 3 - 14 mmol/L    Urea Nitrogen 24 7 - 30 mg/dL    Creatinine 0.73 0.66 - 1.25 mg/dL    Calcium 9.1 8.5 - 10.1 mg/dL    Glucose 119 (H) 70 - 99 mg/dL    GFR Estimate >90 >60 mL/min/1.73m2      Comment:      Effective December 21, 2021 eGFRcr in adults is calculated using the 2021 CKD-EPI creatinine equation which includes age and gender (Charbel mg al., NEJM, DOI: 10.1056/QEYOny7768024)   HEMOGLOBIN A1C   Result Value  Ref Range    Hemoglobin A1C 5.9 (H) 0.0 - 5.6 %      Comment:      Normal <5.7%   Prediabetes 5.7-6.4%    Diabetes 6.5% or higher     Note: Adopted from ADA consensus guidelines.   Hepatitis C Screen Reflex to HCV RNA Quant and Genotype   Result Value Ref Range    Hepatitis C Antibody Nonreactive Nonreactive    Narrative    Assay performance characteristics have not been established for newborns, infants, and children.       If you have any questions or concerns, please call the clinic at the number listed above.       Sincerely,    Radha Fink MD,MPH/hlo

## 2022-07-06 NOTE — PATIENT INSTRUCTIONS
Mike Salazar,    Thank you for allowing [facility] to manage your care.    I ordered some blood work, please go to the laboratory to get your laboratory studies.    I sent your prescriptions to your pharmacy.    I ordered a CT of your lung, please call diagnostic imaging [phone] to schedule your test.    I made a referral for colonoscopy, they will be calling in approximately 1 week to set up your appointment.  If you do not hear from them, please call the specialty number on your after visit.     For your convenience, test results are released as soon as they are available  Please allow 1-2 business days for me to send you a comment about your results.  If not done so, I encourage you to login into [employer] ([email or web address] to review your results in real time.     If you have any questions or concerns, please feel free to call us at [phone].    Sincerely,    Dr. Fink    Did you know?      You can schedule a video visit for follow-up appointments as well as future appointments for certain conditions.  Please see the below link.     [email or web address]    If you have not already done so,  I encourage you to sign up for [employer] ([email or web address]).  This will allow you to review your results, securely communicate with a provider, and schedule virtual visits as well.      Lung Cancer Screening   Frequently Asked Questions  If you are at high-risk for lung cancer, getting screened with low-dose computed tomography (LDCT) every year can help save your life. This handout offers answers to some of the most common questions about lung cancer screening. If you have other questions, please call [phone] ([phone]).     What is it?  Lung cancer screening uses special X-ray technology to create an image of your lung tissue. The exam is quick and easy and takes less than 10 seconds. We don t give you any medicine  or use any needles. You can eat before and after the exam. You don t need to change your clothes as long as the clothing on your chest doesn t contain metal. But, you do need to be able to hold your breath for at least 6 seconds during the exam.    What is the goal of lung cancer screening?  The goal of lung cancer screening is to save lives. Many times, lung cancer is not found until a person starts having physical symptoms. Lung cancer screening can help detect lung cancer in the earliest stages when it may be easier to treat.    Who should be screened for lung cancer?  We suggest lung cancer screening for anyone who is at high-risk for lung cancer. You are in the high-risk group if you:     are between the ages of 55 and 79, and   have smoked at least 1 pack of cigarettes a day for 20 or more years, and   still smoke or have quit within the past 15 years.    However, if you have a new cough or shortness of breath, you should talk to your doctor before being screened.    Why does it matter if I have symptoms?  Certain symptoms can be a sign that you have a condition in your lungs that should be checked and treated by your doctor. These symptoms include fever, chest pain, a new or changing cough, shortness of breath that you have never felt before, coughing up blood or unexplained weight loss. Having any of these symptoms can greatly affect the results of lung cancer screening.       Should all smokers get an LDCT lung cancer screening exam?  It depends. Lung cancer screening is for a very specific group of men and women who have a history of heavy smoking over a long period of time (see  Who should be screened for lung cancer  above).  I am in the high-risk group, but have been diagnosed with cancer in the past. Is LDCT lung cancer screening right for me?  In some cases, you should not have LDCT lung screening, such as when your doctor is already following your cancer with CT scan studies. Your doctor will help  you decide if LDCT lung screening is right for you.  Do I need to have a screening exam every year?  Yes. If you are in the high-risk group described earlier, you should get an LDCT lung cancer screening exam every year until you are 79, or are no longer willing or able to undergo screening and possible procedures to diagnose and treat lung cancer.  How effective is LDCT at preventing death from lung cancer?  Studies have shown that LDCT lung cancer screening can lower the risk of death from lung cancer by 20 percent in people who are at high-risk.  What are the risks?  There are some risks and limitations of LDCT lung cancer screening. We want to make sure you understand the risks and benefits, so please let us know if you have any questions. Your doctor may want to talk with you more about these risks.   Radiation exposure: As with any exam that uses radiation, there is a very small increased risk of cancer. The amount of radiation in LDCT is small--about the same amount a person would get from a mammogram. Your doctor orders the exam when he or she feels the potential benefits outweigh the risks.   False negatives: No test is perfect, including LDCT. It is possible that you may have a medical condition, including lung cancer, that is not found during your exam. This is called a false negative result.   False positives and more testing: LDCT very often finds something in the lung that could be cancer, but in fact is not. This is called a false positive result. False positive tests often cause anxiety. To make sure these findings are not cancer, you may need to have more tests. These tests will be done only if you give us permission. Sometimes patients need a treatment that can have side effects, such as a biopsy. For more information on false positives, see  What can I expect from the results?    Findings not related to lung cancer: Your LDCT exam also takes pictures of areas of your body next to your lungs. In a  very small number of cases, the CT scan will show an abnormal finding in one of these areas, such as your kidneys, adrenal glands, liver or thyroid. This finding may not be serious, but you may need more tests. Your doctor can help you decide what other tests you may need, if any.  What can I expect from the results?  About 1 out of 4 LDCT exams will find something that may need more tests. Most of the time, these findings are lung nodules. Lung nodules are very small collections of tissue in the lung. These nodules are very common, and the vast majority--more than 97 percent--are not cancer (benign). Most are normal lymph nodes or small areas of scarring from past infections.  But, if a small lung nodule is found to be cancer, the cancer can be cured more than 90 percent of the time. To know if the nodule is cancer, we may need to get more images before your next yearly screening exam. If the nodule has suspicious features (for example, it is large, has an odd shape or grows over time), we will refer you to a specialist for further testing.  Will my doctor also get the results?  Yes. Your doctor will get a copy of your results.  Is it okay to keep smoking now that there s a cancer screening exam?  No. Tobacco is one of the strongest cancer-causing agents. It causes not only lung cancer, but other cancers and cardiovascular (heart) diseases as well. The damage caused by smoking builds over time. This means that the longer you smoke, the higher your risk of disease. While it is never too late to quit, the sooner you quit, the better.  Where can I find help to quit smoking?  The best way to prevent lung cancer is to stop smoking. If you have already quit smoking, congratulations and keep it up! For help on quitting smoking, please call [employer] at 5-952-QUITNOW (1-288.851.4773) or the American [employer] Society at 1-531.937.1397 to find local resources near you.  One-on-one health coaching:  If you d prefer to work  individually with a health care provider on tobacco cessation, we offer:     Medication Therapy Management:  Our specially trained pharmacists work closely with you and your doctor to help you quit smoking.  Call [phone] or [phone] (toll free).

## 2022-07-06 NOTE — PROGRESS NOTES
Lung Cancer Screening Shared Decision Making Visit     Martin Salvador, a 52 year old male, is eligible for lung cancer screening    History   Smoking Status     Former Smoker     Packs/day: 1.00     Types: Cigarettes     Start date: 1/1/1984     Quit date: 1/16/2016   Smokeless Tobacco     Never Used   [id number]}    I have discussed with patient the risks and benefits of screening for lung cancer with low-dose CT.     The risks include:    radiation exposure: one low dose chest CT has as much ionizing radiation as about 15 chest x-rays, or 6 months of background radiation living in [place]      false positives: most findings/nodules are NOT cancer, but some might still require additional diagnostic evaluation, including biopsy    over-diagnosis: some slow growing cancers that might never have been clinically significant will be detected and treated unnecessarily     The benefit of early detection of lung cancer is contingent upon adherence to annual screening or more frequent follow up if indicated.     Furthermore, to benefit from screening, Martin must be willing and able to undergo diagnostic procedures, if indicated. Although no specific guide is available for determining severity of comorbidities, it is reasonable to withhold screening in patients who have greater mortality risk from other diseases.     We did discuss that the best way to prevent lung cancer is to not smoke.    Some patients may value a numeric estimation of lung cancer risk when evaluating if lung cancer screening is right for them, here is one calculator:    ShouldIScreen

## 2022-07-06 NOTE — PROGRESS NOTES
Assessment & Plan     1. Type 2 diabetes mellitus without complication, without long-term current use of insulin (H)    - HEMOGLOBIN A1C; Future  - BASIC METABOLIC PANEL; Future  - Lipid panel reflex to direct LDL Fasting; Future  - Albumin Random Urine Quantitative with Creat Ratio; Future  - metFORMIN (GLUCOPHAGE) 500 MG tablet; Take 1 tablet (500 mg) by mouth 2 times daily (with meals) for 90 days  Dispense: 180 tablet; Refill: 3  - FOOT EXAM  - Albumin Random Urine Quantitative with Creat Ratio  - Lipid panel reflex to direct LDL Fasting  - BASIC METABOLIC PANEL  - HEMOGLOBIN A1C  ReCommend healthy diet and exercise  2. Hyperlipidemia LDL goal <100    - atorvastatin (LIPITOR) 20 MG tablet; Take 1 tablet (20 mg) by mouth daily Labs due.  Dispense: 90 tablet; Refill: 0    3. Gastroesophageal reflux disease without esophagitis    - omeprazole (PRILOSEC) 20 MG DR capsule; Take 1 capsule (20 mg) by mouth daily for 90 days  Dispense: 90 capsule; Refill: 1    4. Need for hepatitis C screening test    - Hepatitis C Screen Reflex to HCV RNA Quant and Genotype; Future  - Hepatitis C Screen Reflex to HCV RNA Quant and Genotype    5. Screen for colon cancer    - Adult GI  Referral - Procedure Only; Future    6. Personal history of tobacco use    - Prof fee: Shared Decision Making for Lung Cancer Screening  - CT Chest Lung Cancer Scrn Low Dose wo; Future    7. Encounter for administration of vaccine    - Pneumococcal 20 Valent Conjugate (Prevnar 20)      Return in about 6 months (around 1/6/2023) for Follow up, in person.    Radha Fink MD  [facility] MICHELLE Salazar is a 52 year old presenting for the following health issues:  Diabetes      History of Present Illness       Diabetes:   He presents for follow up of diabetes.  He is not checking blood glucose. He has no concerns regarding his diabetes at this time.  He is not experiencing numbness or burning in feet, excessive  "thirst, blurry vision, weight changes or redness, sores or blisters on feet. The patient has not had a diabetic eye exam in the last 12 months.         He eats 0-1 servings of fruits and vegetables daily.He consumes 1 sweetened beverage(s) daily.He exercises with enough effort to increase his heart rate 10 to 19 minutes per day.  He exercises with enough effort to increase his heart rate 3 or less days per week. He is missing 2 dose(s) of medications per week.    Today's PHQ-9         PHQ-9 Total Score: 1    PHQ-9 Q9 Thoughts of better off dead/self-harm past 2 weeks :   Not at all    How difficult have these problems made it for you to do your work, take care of things at home, or get along with other people: Not difficult at all         HYPERLIPIDEMIA - Patient has a long history of significant Hyperlipidemia requiring medication for treatment with recent good control. Patient reports no problems or side effects with the medication.   Patient has GERD for which he uses PPI.  Is compliant with all of his medications.  He denies any medication side effects.  Patient smoked for 25 years.  He quit smoking 8 years ago.  Recent Labs   Lab Test 02/17/21  0911 11/27/20  1147   CHOL 155 345*   HDL 53 50   LDL 71 248*   TRIG 156* 234*             BP Readings from Last 2 Encounters:   07/06/22 116/64   12/23/20 128/78     Hemoglobin A1C POCT (%)   Date Value   02/17/2021 6.1 (H)   11/27/2020 6.5 (H)     Hemoglobin A1C (%)   Date Value   07/06/2022 5.9 (H)     LDL Cholesterol Calculated (mg/dL)   Date Value   02/17/2021 71   11/27/2020 248 (H)               Review of Systems   Constitutional, HEENT, cardiovascular, pulmonary, gi and gu systems are negative, except as otherwise noted.      Objective    /64   Pulse 74   Temp 97.7  F (36.5  C) (Temporal)   Resp 18   Ht 1.629 m (5' 4.13\")   Wt 58.1 kg (128 lb)   SpO2 98%   BMI 21.88 kg/m    Body mass index is 21.88 kg/m .  Physical Exam   GENERAL: healthy, alert and " no distress  NECK: no adenopathy, no asymmetry, masses, or scars and thyroid normal to palpation  RESP: lungs clear to auscultation - no rales, rhonchi or wheezes  CV: regular rate and rhythm, normal S1 S2, no S3 or S4, no murmur, click or rub, no peripheral edema and peripheral pulses strong  ABDOMEN: soft, nontender, no hepatosplenomegaly, no masses and bowel sounds normal  MS: no gross musculoskeletal defects noted, no edema    Diabetic foot exam: normal DP and PT pulses, no trophic changes or ulcerative lesions and normal sensory exam

## 2022-07-07 LAB — HCV AB SERPL QL IA: NONREACTIVE

## 2022-07-14 ENCOUNTER — ANCILLARY PROCEDURE (OUTPATIENT)
Dept: CT IMAGING | Facility: CLINIC | Age: 52
End: 2022-07-14
Attending: STUDENT IN AN ORGANIZED HEALTH CARE EDUCATION/TRAINING PROGRAM
Payer: COMMERCIAL

## 2022-07-14 DIAGNOSIS — Z87.891 PERSONAL HISTORY OF TOBACCO USE: ICD-10-CM

## 2022-07-14 PROCEDURE — 71271 CT THORAX LUNG CANCER SCR C-: CPT | Mod: GC | Performed by: RADIOLOGY

## 2022-07-15 ENCOUNTER — TELEPHONE (OUTPATIENT)
Dept: FAMILY MEDICINE | Facility: CLINIC | Age: 52
End: 2022-07-15

## 2022-07-15 NOTE — TELEPHONE ENCOUNTER
"Patient calling for clarification regarding CT results. Provided with message from Dr. Sotelo:    \"Martin,     Your CT shows 3 benign nodules(not cancer).  Emphysema: damage caused by smoking     I will recommend to continue smoking cessation and yearly CT of the chest     Please call me with any questions or concerns.     Radha Fink MD,MPH\"    Patient is wondering if emphysema could be caused by COVID. Advised that Dr. Soetlo has stated that the emphysema has been caused by smoking. Patient wondering if medication can be prescribed for emphysema, advised that medication has not been recommended by Dr. Sotelo at this time. Patient denies shortness of breath. No further question at this time.     Delaney Mac RN   [facility]    "

## 2022-07-15 NOTE — TELEPHONE ENCOUNTER
Reason for Call:  Other appointment    Detailed comments: Patient would like to talk to provider about CT results. Patient wanting to know if there is something that he should be doing? Medication? Patient very nervous.     Phone Number Patient can be reached at: Cell number on file:    Telephone Information:   Mobile [phone]       Best Time: as soon as possible    Can we leave a detailed message on this number? Not Applicable    Call taken on 7/15/2022 at 3:50 PM by Amanda Dugan

## 2022-07-18 NOTE — TELEPHONE ENCOUNTER
Patient notified of provider's message as written. Patient verbalized understanding. Still asking several questions regarding results. This is the third discussion with patient regarding results. Scheduled for telephone visit 7/20/22 with Dr. Sotelo to discuss results in further detail. Patient had previously made appointment in [place] to discuss results, appointment cancelled.     Delaney Mac RN   [facility]

## 2022-07-20 ENCOUNTER — VIRTUAL VISIT (OUTPATIENT)
Dept: FAMILY MEDICINE | Facility: CLINIC | Age: 52
End: 2022-07-20
Payer: COMMERCIAL

## 2022-07-20 DIAGNOSIS — J43.2 CENTRILOBULAR EMPHYSEMA (H): Primary | ICD-10-CM

## 2022-07-20 DIAGNOSIS — R91.8 PULMONARY NODULES: ICD-10-CM

## 2022-07-20 PROCEDURE — 99213 OFFICE O/P EST LOW 20 MIN: CPT | Mod: 95 | Performed by: STUDENT IN AN ORGANIZED HEALTH CARE EDUCATION/TRAINING PROGRAM

## 2022-07-20 ASSESSMENT — PAIN SCALES - GENERAL: PAINLEVEL: NO PAIN (0)

## 2022-07-20 NOTE — PATIENT INSTRUCTIONS
Miek Salazar,    Thank you for allowing [facility] to manage your care.    For your convenience, test results are released as soon as they are available  Please allow 1-2 business days for me to send you a comment about your results.  If not done so, I encourage you to login into [employer] ([email or web address] to review your results in real time.     If you have any questions or concerns, please feel free to call us at [phone].    Sincerely,    Dr. Fink    Did you know?      You can schedule a video visit for follow-up appointments as well as future appointments for certain conditions.  Please see the below link.     [email or web address]    If you have not already done so,  I encourage you to sign up for [employer] ([email or web address]).  This will allow you to review your results, securely communicate with a provider, and schedule virtual visits as well.

## 2022-07-20 NOTE — PROGRESS NOTES
Martin is a 52 year old who is being evaluated via a billable telephone visit.      What phone number would you like to be contacted at? [phone]  How would you like to obtain your AVS? Mail a copy    Assessment & Plan   1. Centrilobular emphysema (H), mild  Patient was a smoker.  I explained to patient that lung nodule and emphysema were first noted on CT in 2014 in 2016 and this was prior to COVID.  I explained to patient that the emphysema was caused by his smoking. I  advised patient to continue smoking cessation.  He verbalized understanding.  All questions answered.  2. Pulmonary nodules  Three small lung nodule found on CT, benign.        Return in about 3 months (around 10/20/2022) for Follow up, in person.    Radha Fink MD  [facility] MICHELLE Salazar is a 52 year old, presenting for the following health issues:  Results      HPI   Patient questioning about what is Emphysema and how big are the nodules?     Results   Description: Patient has CT results to discuss with provider. Patient is questioning about what caused him to have Emphysema and how big are the nodules on CT scan. Patient mentioned having COVID back in 2020, asking if that was a trigger of Emphysema.           Review of Systems   Constitutional, HEENT, cardiovascular, pulmonary, gi and gu systems are negative, except as otherwise noted.      Objective    Vitals - Patient Reported  Pain Score: No Pain (0)      Vitals:  No vitals were obtained today due to virtual visit.    Physical Exam   healthy, alert and no distress  PSYCH: Alert and oriented times 3; coherent speech, normal   rate and volume, able to articulate logical thoughts, able   to abstract reason, no tangential thoughts, no hallucinations   or delusions  His affect is normal  RESP: No cough, no audible wheezing, able to talk in full sentences  Remainder of exam unable to be completed due to telephone visits        Phone call duration:  10minutes

## 2023-02-07 NOTE — LETTER
July 9, 2018      Martin Salvador  [address]  [place] [address]        Dear Martin,    The lab results from the most recent visit are all normal or in a good range.     There was no blood in the stool. Recheck in 1 year.    Please call me if you have any questions about your condition or care.     Sincerely,      Kristan Prince MD/[facility]    Resulted Orders   Fecal colorectal cancer screen FIT   Result Value Ref Range    Occult Blood Scn FIT Negative NEG^Negative      You can access the FollowMyHealth Patient Portal offered by [facility] by registering at the following website: [email or web address] By joining [facility]’s FollowMyHealth portal, you will also be able to view your health information using other applications (apps) compatible with our system.

## 2023-03-17 ENCOUNTER — OFFICE VISIT (OUTPATIENT)
Dept: FAMILY MEDICINE | Facility: CLINIC | Age: 53
End: 2023-03-17
Payer: COMMERCIAL

## 2023-03-17 ENCOUNTER — LAB (OUTPATIENT)
Dept: FAMILY MEDICINE | Facility: CLINIC | Age: 53
End: 2023-03-17

## 2023-03-17 VITALS
DIASTOLIC BLOOD PRESSURE: 74 MMHG | TEMPERATURE: 98.2 F | WEIGHT: 129 LBS | BODY MASS INDEX: 22.02 KG/M2 | RESPIRATION RATE: 16 BRPM | SYSTOLIC BLOOD PRESSURE: 120 MMHG | OXYGEN SATURATION: 99 % | HEIGHT: 64 IN | HEART RATE: 75 BPM

## 2023-03-17 DIAGNOSIS — K64.8 INTERNAL HEMORRHOIDS: ICD-10-CM

## 2023-03-17 DIAGNOSIS — E78.5 HYPERLIPIDEMIA LDL GOAL <100: ICD-10-CM

## 2023-03-17 DIAGNOSIS — R35.0 BENIGN PROSTATIC HYPERPLASIA WITH URINARY FREQUENCY: ICD-10-CM

## 2023-03-17 DIAGNOSIS — E11.9 TYPE 2 DIABETES MELLITUS WITHOUT COMPLICATION, WITHOUT LONG-TERM CURRENT USE OF INSULIN (H): Primary | ICD-10-CM

## 2023-03-17 DIAGNOSIS — Z12.11 SCREEN FOR COLON CANCER: ICD-10-CM

## 2023-03-17 DIAGNOSIS — N40.1 BENIGN PROSTATIC HYPERPLASIA WITH URINARY FREQUENCY: ICD-10-CM

## 2023-03-17 LAB
HBA1C MFR BLD: 6 % (ref 0–5.6)
PSA SERPL-MCNC: 1.52 UG/L (ref 0–4)

## 2023-03-17 PROCEDURE — G0103 PSA SCREENING: HCPCS | Performed by: FAMILY MEDICINE

## 2023-03-17 PROCEDURE — 83036 HEMOGLOBIN GLYCOSYLATED A1C: CPT | Performed by: FAMILY MEDICINE

## 2023-03-17 PROCEDURE — 36415 COLL VENOUS BLD VENIPUNCTURE: CPT | Performed by: FAMILY MEDICINE

## 2023-03-17 PROCEDURE — 99214 OFFICE O/P EST MOD 30 MIN: CPT | Performed by: FAMILY MEDICINE

## 2023-03-17 RX ORDER — TAMSULOSIN HYDROCHLORIDE 0.4 MG/1
0.4 CAPSULE ORAL DAILY
Qty: 90 CAPSULE | Refills: 3 | Status: SHIPPED | OUTPATIENT
Start: 2023-03-17 | End: 2023-09-14

## 2023-03-17 RX ORDER — ATORVASTATIN CALCIUM 20 MG/1
20 TABLET, FILM COATED ORAL DAILY
Qty: 90 TABLET | Refills: 3 | Status: SHIPPED | OUTPATIENT
Start: 2023-03-17 | End: 2023-09-14

## 2023-03-17 NOTE — LETTER
March 20, 2023      Martin Salvador  [address] [place] [address]        Dear ,    We are writing to inform you of your test results.    Your diabetes test is at goal. Your prostate test was normal. Please follow up in 3-6 months for routine physical for recheck.    Resulted Orders   HEMOGLOBIN A1C   Result Value Ref Range    Hemoglobin A1C 6.0 (H) 0.0 - 5.6 %      Comment:      Normal <5.7%   Prediabetes 5.7-6.4%    Diabetes 6.5% or higher     Note: Adopted from ADA consensus guidelines.   PSA, screen   Result Value Ref Range    Prostate Specific Antigen Screen 1.52 0.00 - 4.00 ug/L       If you have any questions or concerns, please call the clinic at the number listed above.       Sincerely,      Tyler Hollins MD

## 2023-03-17 NOTE — PROGRESS NOTES
"  Ashley Salazar is a 53 year old, presenting for the following health issues:  Urinary Problem      History of Present Illness       Diabetes:   He presents for follow up of diabetes.  He is not checking blood glucose. He has no concerns regarding his diabetes at this time.  He is having weight loss. The patient has not had a diabetic eye exam in the last 12 months.         He eats 0-1 servings of fruits and vegetables daily.He consumes 1 sweetened beverage(s) daily.He exercises with enough effort to increase his heart rate 9 or less minutes per day.  He exercises with enough effort to increase his heart rate 3 or less days per week. He is missing 3 dose(s) of medications per week.       Concern - waking once or twice a night to urinate   Onset: over one year  Description: disruption of sleep to get up to urination. No frequency during the day  Intensity: mild  Progression of Symptoms:  same  Accompanying Signs & Symptoms: none  Previous history of similar problem: ongoing   Precipitating factors:        Worsened by: n/a  Alleviating factors:        Improved by: n/a  Therapies tried and outcome: None    Hyperlipidemia Follow-Up      Are you regularly taking any medication or supplement to lower your cholesterol?   No, ran out of atorvastatin    Are you having muscle aches or other side effects that you think could be caused by your cholesterol lowering medication?  No      Review of Systems   Constitutional, HEENT, cardiovascular, pulmonary, GI, , musculoskeletal, neuro, skin, endocrine and psych systems are negative, except as otherwise noted.      Objective    /74 (BP Location: Left arm, Patient Position: Sitting, Cuff Size: Adult Regular)   Pulse 75   Temp 98.2  F (36.8  C) (Oral)   Resp 16   Ht 1.629 m (5' 4.13\")   Wt 58.5 kg (129 lb)   SpO2 99%   BMI 22.05 kg/m    Body mass index is 22.05 kg/m .  Physical Exam   GENERAL: healthy, alert and no distress  NECK: no adenopathy, no asymmetry, " masses, or scars and thyroid normal to palpation  RESP: lungs clear to auscultation - no rales, rhonchi or wheezes  CV: regular rate and rhythm, normal S1 S2, no S3 or S4, no murmur, click or rub, no peripheral edema and peripheral pulses strong  ABDOMEN: soft, nontender, no hepatosplenomegaly, no masses and bowel sounds normal  MS: no gross musculoskeletal defects noted, no edema  Diabetic foot exam: normal DP and PT pulses, no trophic changes or ulcerative lesions and normal sensory exam    A/P:  (E11.9) Type 2 diabetes mellitus without complication, without long-term current use of insulin (H)  (primary encounter diagnosis)  Comment:   Plan: HEMOGLOBIN A1C, metFORMIN (GLUCOPHAGE) 500 MG         tablet        Recheck a1c and adjust if needed. Patient not taking metformin as prescribed. Recheck in 3 months.    (E78.5) Hyperlipidemia LDL goal <100  Comment:   Plan: atorvastatin (LIPITOR) 20 MG tablet        Likely not controlled. Restart atorvastatin. Recheck in 3 months.    (K64.8) Internal hemorrhoids  Comment:   Plan: Adult Colorectal Surgery  Referral            (N40.1,  R35.0) Benign prostatic hyperplasia with urinary frequency  Comment:   Plan: tamsulosin (FLOMAX) 0.4 MG capsule, PSA, screen        Restart tamsulosin to see if this helps with symptoms.    (Z12.11) Screen for colon cancer  Comment:   Plan: COLOGUARD([employer])            Tyler Hollins MD

## 2023-09-14 ENCOUNTER — LAB (OUTPATIENT)
Dept: FAMILY MEDICINE | Facility: CLINIC | Age: 53
End: 2023-09-14

## 2023-09-14 ENCOUNTER — OFFICE VISIT (OUTPATIENT)
Dept: FAMILY MEDICINE | Facility: CLINIC | Age: 53
End: 2023-09-14
Payer: COMMERCIAL

## 2023-09-14 VITALS
OXYGEN SATURATION: 98 % | SYSTOLIC BLOOD PRESSURE: 128 MMHG | DIASTOLIC BLOOD PRESSURE: 67 MMHG | HEIGHT: 64 IN | RESPIRATION RATE: 15 BRPM | WEIGHT: 127.6 LBS | TEMPERATURE: 97.8 F | HEART RATE: 77 BPM | BODY MASS INDEX: 21.78 KG/M2

## 2023-09-14 DIAGNOSIS — N40.1 BENIGN PROSTATIC HYPERPLASIA WITH URINARY FREQUENCY: ICD-10-CM

## 2023-09-14 DIAGNOSIS — E11.9 TYPE 2 DIABETES MELLITUS WITHOUT COMPLICATION, WITHOUT LONG-TERM CURRENT USE OF INSULIN (H): Primary | ICD-10-CM

## 2023-09-14 DIAGNOSIS — Z12.11 SCREEN FOR COLON CANCER: ICD-10-CM

## 2023-09-14 DIAGNOSIS — E78.5 HYPERLIPIDEMIA LDL GOAL <100: ICD-10-CM

## 2023-09-14 DIAGNOSIS — R35.0 BENIGN PROSTATIC HYPERPLASIA WITH URINARY FREQUENCY: ICD-10-CM

## 2023-09-14 LAB — HBA1C MFR BLD: 6.2 % (ref 0–5.6)

## 2023-09-14 PROCEDURE — 82570 ASSAY OF URINE CREATININE: CPT | Performed by: FAMILY MEDICINE

## 2023-09-14 PROCEDURE — 82043 UR ALBUMIN QUANTITATIVE: CPT | Performed by: FAMILY MEDICINE

## 2023-09-14 PROCEDURE — 36415 COLL VENOUS BLD VENIPUNCTURE: CPT | Performed by: FAMILY MEDICINE

## 2023-09-14 PROCEDURE — 80061 LIPID PANEL: CPT | Performed by: FAMILY MEDICINE

## 2023-09-14 PROCEDURE — 83036 HEMOGLOBIN GLYCOSYLATED A1C: CPT | Performed by: FAMILY MEDICINE

## 2023-09-14 PROCEDURE — 99214 OFFICE O/P EST MOD 30 MIN: CPT | Performed by: FAMILY MEDICINE

## 2023-09-14 PROCEDURE — 80048 BASIC METABOLIC PNL TOTAL CA: CPT | Performed by: FAMILY MEDICINE

## 2023-09-14 RX ORDER — ATORVASTATIN CALCIUM 20 MG/1
20 TABLET, FILM COATED ORAL DAILY
Qty: 90 TABLET | Refills: 3 | Status: SHIPPED | OUTPATIENT
Start: 2023-09-14

## 2023-09-14 RX ORDER — TAMSULOSIN HYDROCHLORIDE 0.4 MG/1
0.4 CAPSULE ORAL DAILY
Qty: 90 CAPSULE | Refills: 3 | Status: SHIPPED | OUTPATIENT
Start: 2023-09-14 | End: 2024-09-18

## 2023-09-14 ASSESSMENT — PAIN SCALES - GENERAL: PAINLEVEL: NO PAIN (0)

## 2023-09-14 NOTE — PROGRESS NOTES
"    Ashley Salazar is a 53 year old, presenting for the following health issues:  Diabetes      9/14/2023    12:51 PM   Additional Questions   Roomed by Celia   Accompanied by self       History of Present Illness       Diabetes:   He presents for follow up of diabetes.    He is not checking blood glucose.   Blood glucose is never over 200 and never under 70. He is aware of hypoglycemia symptoms including none.    He has no concerns regarding his diabetes at this time.  He is having weight loss.  The patient has not had a diabetic eye exam in the last 12 months.          He eats 0-1 servings of fruits and vegetables daily.He consumes 1 sweetened beverage(s) daily.He exercises with enough effort to increase his heart rate 9 or less minutes per day.  He exercises with enough effort to increase his heart rate 3 or less days per week.   He is not taking prescribed medications regularly due to other.       Hyperlipidemia Follow-Up    Are you regularly taking any medication or supplement to lower your cholesterol?   Yes- atorvastatin  Are you having muscle aches or other side effects that you think could be caused by your cholesterol lowering medication?  No    Review of Systems   Constitutional, HEENT, cardiovascular, pulmonary, GI, , musculoskeletal, neuro, skin, endocrine and psych systems are negative, except as otherwise noted.      Objective    /67 (BP Location: Left arm, Patient Position: Sitting, Cuff Size: Child)   Pulse 77   Temp 97.8  F (36.6  C) (Oral)   Resp 15   Ht 1.629 m (5' 4.13\")   Wt 57.9 kg (127 lb 9.6 oz)   SpO2 98%   BMI 21.81 kg/m    Body mass index is 21.81 kg/m .  Physical Exam   GENERAL: healthy, alert and no distress  NECK: no adenopathy, no asymmetry, masses, or scars and thyroid normal to palpation  RESP: lungs clear to auscultation - no rales, rhonchi or wheezes  CV: regular rate and rhythm, normal S1 S2, no S3 or S4, no murmur, click or rub, no peripheral edema and " peripheral pulses strong  ABDOMEN: soft, nontender, no hepatosplenomegaly, no masses and bowel sounds normal  MS: no gross musculoskeletal defects noted, no edema  Diabetic foot exam: normal DP and PT pulses, no trophic changes or ulcerative lesions, and normal sensory exam    A/P:    (E11.9) Type 2 diabetes mellitus without complication, without long-term current use of insulin (H)  (primary encounter diagnosis)  Comment:   Plan: HEMOGLOBIN A1C, BASIC METABOLIC PANEL, Albumin         Random Urine Quantitative with Creat Ratio,         metFORMIN (GLUCOPHAGE) 500 MG tablet        Usually controlled. Recheck A1c. Recheck in 6 months with physical.    (E78.5) Hyperlipidemia LDL goal <100  Comment:   Plan: Lipid panel reflex to direct LDL Non-fasting,         atorvastatin (LIPITOR) 20 MG tablet        Controlled.    (N40.1,  R35.0) Benign prostatic hyperplasia with urinary frequency  Comment:   Plan: tamsulosin (FLOMAX) 0.4 MG capsule        Stable.    (Z12.11) Screen for colon cancer  Comment:   Plan: COLOGUARD([employer])              Tyler Hollins MD

## 2023-09-14 NOTE — LETTER
September 15, 2023      Martin Salvador  [address] [place] [address]        Dear m,    We are writing to inform you of your test results.    Your cholesterol increased compared to last time and not at goal. Are you taking the cholesterol medication, atorvastatin, daily? Please take your medications daily. Your diabetes is at goal. Your kidney and electrolyte tests were normal. Please follow up in 6 months for recheck.     Resulted Orders   HEMOGLOBIN A1C   Result Value Ref Range    Hemoglobin A1C 6.2 (H) 0.0 - 5.6 %      Comment:      Normal <5.7%   Prediabetes 5.7-6.4%    Diabetes 6.5% or higher     Note: Adopted from ADA consensus guidelines.   BASIC METABOLIC PANEL   Result Value Ref Range    Sodium 139 136 - 145 mmol/L    Potassium 4.4 3.4 - 5.3 mmol/L    Chloride 99 98 - 107 mmol/L    Carbon Dioxide (CO2) 23 22 - 29 mmol/L    Anion Gap 17 (H) 7 - 15 mmol/L    Urea Nitrogen 18.4 6.0 - 20.0 mg/dL    Creatinine 0.81 0.67 - 1.17 mg/dL    Calcium 9.7 8.6 - 10.0 mg/dL    Glucose 135 (H) 70 - 99 mg/dL    GFR Estimate >90 >60 mL/min/1.73m2   Lipid panel reflex to direct LDL Non-fasting   Result Value Ref Range    Cholesterol 232 (H) <200 mg/dL    Triglycerides 290 (H) <150 mg/dL    Direct Measure HDL 47 >=40 mg/dL    LDL Cholesterol Calculated 127 (H) <=100 mg/dL    Non HDL Cholesterol 185 (H) <130 mg/dL    Narrative    Cholesterol  Desirable:  <200 mg/dL    Triglycerides  Normal:  Less than 150 mg/dL  Borderline High:  150-199 mg/dL  High:  200-499 mg/dL  Very High:  Greater than or equal to 500 mg/dL    Direct Measure HDL  Female:  Greater than or equal to 50 mg/dL   Male:  Greater than or equal to 40 mg/dL    LDL Cholesterol  Desirable:  <100mg/dL  Above Desirable:  100-129 mg/dL   Borderline High:  130-159 mg/dL   High:  160-189 mg/dL   Very High:  >= 190 mg/dL    Non HDL Cholesterol  Desirable:  130 mg/dL  Above Desirable:  130-159 mg/dL  Borderline High:  160-189 mg/dL  High:  190-219  mg/dL  Very High:  Greater than or equal to 220 mg/dL   Albumin Random Urine Quantitative with Creat Ratio   Result Value Ref Range    Creatinine Urine mg/dL 61.2 mg/dL      Comment:      The reference ranges have not been established in urine creatinine. The results should be integrated into the clinical context for interpretation.    Albumin Urine mg/L <12.0 mg/L      Comment:      The reference ranges have not been established in urine albumin. The results should be integrated into the clinical context for interpretation.    Albumin Urine mg/g Cr        Comment:      Unable to calculate, urine albumin and/or urine creatinine is outside detectable limits.  Microalbuminuria is defined as an albumin:creatinine ratio of 17 to 299 for males and 25 to 299 for females. A ratio of albumin:creatinine of 300 or higher is indicative of overt proteinuria.  Due to biologic variability, positive results should be confirmed by a second, first-morning random or 24-hour timed urine specimen. If there is discrepancy, a third specimen is recommended. When 2 out of 3 results are in the microalbuminuria range, this is evidence for incipient nephropathy and warrants increased efforts at glucose control, blood pressure control, and institution of therapy with an angiotensin-converting-enzyme (ACE) inhibitor (if the patient can tolerate it).         If you have any questions or concerns, please call the clinic at the number listed above.       Sincerely,      Tyler Hollins MD

## 2023-09-15 LAB
ANION GAP SERPL CALCULATED.3IONS-SCNC: 17 MMOL/L (ref 7–15)
BUN SERPL-MCNC: 18.4 MG/DL (ref 6–20)
CALCIUM SERPL-MCNC: 9.7 MG/DL (ref 8.6–10)
CHLORIDE SERPL-SCNC: 99 MMOL/L (ref 98–107)
CHOLEST SERPL-MCNC: 232 MG/DL
CREAT SERPL-MCNC: 0.81 MG/DL (ref 0.67–1.17)
CREAT UR-MCNC: 61.2 MG/DL
DEPRECATED HCO3 PLAS-SCNC: 23 MMOL/L (ref 22–29)
EGFRCR SERPLBLD CKD-EPI 2021: >90 ML/MIN/1.73M2
GLUCOSE SERPL-MCNC: 135 MG/DL (ref 70–99)
HDLC SERPL-MCNC: 47 MG/DL
LDLC SERPL CALC-MCNC: 127 MG/DL
MICROALBUMIN UR-MCNC: <12 MG/L
MICROALBUMIN/CREAT UR: NORMAL MG/G{CREAT}
NONHDLC SERPL-MCNC: 185 MG/DL
POTASSIUM SERPL-SCNC: 4.4 MMOL/L (ref 3.4–5.3)
SODIUM SERPL-SCNC: 139 MMOL/L (ref 136–145)
TRIGL SERPL-MCNC: 290 MG/DL

## 2023-11-03 ENCOUNTER — DOCUMENTATION ONLY (OUTPATIENT)
Dept: FAMILY MEDICINE | Facility: CLINIC | Age: 53
End: 2023-11-03
Payer: COMMERCIAL

## 2023-11-03 NOTE — PROGRESS NOTES
Martin HORTON Salvador has an upcoming lab appointment:    Future Appointments   Date Time Provider Department Center   11/7/2023  8:45 AM [facility]   12/4/2023  9:30 AM Mercedes Sharpe APRN CNP [facility]     Patient is scheduled for the following lab(s): Pre visit Labs. Please place orders if needed.    There is no order available. Please review and place either future orders or HMPO (Review of Health Maintenance Protocol Orders), as appropriate.    There are no preventive care reminders to display for this patient.  Cal Hall

## 2023-11-29 ENCOUNTER — TRANSFERRED RECORDS (OUTPATIENT)
Dept: HEALTH INFORMATION MANAGEMENT | Facility: CLINIC | Age: 53
End: 2023-11-29
Payer: COMMERCIAL

## 2023-11-29 LAB — RETINOPATHY: NEGATIVE

## 2023-12-04 ENCOUNTER — OFFICE VISIT (OUTPATIENT)
Dept: FAMILY MEDICINE | Facility: CLINIC | Age: 53
End: 2023-12-04
Payer: COMMERCIAL

## 2023-12-04 VITALS
DIASTOLIC BLOOD PRESSURE: 64 MMHG | SYSTOLIC BLOOD PRESSURE: 110 MMHG | HEIGHT: 64 IN | TEMPERATURE: 97.9 F | RESPIRATION RATE: 20 BRPM | OXYGEN SATURATION: 99 % | BODY MASS INDEX: 22.02 KG/M2 | WEIGHT: 129 LBS | HEART RATE: 81 BPM

## 2023-12-04 DIAGNOSIS — K64.8 INTERNAL HEMORRHOID: ICD-10-CM

## 2023-12-04 DIAGNOSIS — N40.1 BENIGN PROSTATIC HYPERPLASIA WITH URINARY FREQUENCY: ICD-10-CM

## 2023-12-04 DIAGNOSIS — K64.4 EXTERNAL HEMORRHOIDS: Primary | ICD-10-CM

## 2023-12-04 DIAGNOSIS — R35.0 BENIGN PROSTATIC HYPERPLASIA WITH URINARY FREQUENCY: ICD-10-CM

## 2023-12-04 PROCEDURE — 99213 OFFICE O/P EST LOW 20 MIN: CPT

## 2023-12-04 RX ORDER — HYDROCORTISONE 25 MG/G
CREAM TOPICAL 2 TIMES DAILY PRN
Qty: 28 G | Refills: 1 | Status: SHIPPED | OUTPATIENT
Start: 2023-12-04 | End: 2024-01-29

## 2023-12-04 ASSESSMENT — PAIN SCALES - GENERAL: PAINLEVEL: NO PAIN (0)

## 2023-12-04 NOTE — PATIENT INSTRUCTIONS
At [facility], we strive to deliver an exceptional experience to you, every time we see you. If you receive a survey, please complete it as we do value your feedback.  If you have MyChart, you can expect to receive results automatically within 24 hours of their completion.  Your provider will send a note interpreting your results as well.   If you do not have MyChart, you should receive your results in about a week by mail.    Your care team:                            Family Medicine Internal Medicine   MD Landon Keith MD Shantel Branch-Fleming, MD Srinivasa Vaka, MD Katya Belousova, PAPABLITO Hollins, MD Pediatrics   David Muñiz, PAMD Lucille Soliman MD Amelia Massimini APRN CNP   TONI Hammer CNP, MD Charanya Pasupathi, MD Kathleen Widmer, NP coming October 2023 Same-Day (No follow up visit)    MARK Lott PA coming Oct 2023     Clinic hours: Monday - Thursday 7 am-6 pm; Fridays 7 am-5 pm.   Urgent care: Monday - Friday 10 am- 8 pm; Saturday and Sunday 9 am-5 pm.    Clinic: [phone]       [facility]: Monday - Thursday 8 am - 7 pm; Friday 8 am - 6 pm  [facility]: [phone]

## 2023-12-04 NOTE — PROGRESS NOTES
Assessment & Plan     External hemorrhoids  - Advised adequate fluid intake and methods to avoid constipation.   - hydrocortisone, Perianal, (HYDROCORTISONE) 2.5 % cream  Dispense: 28 g; Refill: 1 to be used as needed.   - Adult Colorectal Surgery  Referral    Internal hemorrhoid  - Discussed avoidance of constipation.  - Colorectal surgery referral ordered.     Benign prostatic hyperplasia with urinary frequency  - Continued tamsulosin prescribed in September. Discussed mechanism of action and use of medications.    TONI Flowers CNP  [facility] is a 53 year old, presenting for the following health issues:  Rectal Problem      12/4/2023     9:32 AM   Additional Questions   Roomed by Lisandro   Accompanied by Self     History of Present Illness       Mental Health Follow-up:  Patient presents to follow-up on Depression.Patient's depression since last visit has been:  Medium  The patient is not having other symptoms associated with depression.      Any significant life events: health concerns  Patient is not feeling anxious or having panic attacks.  Patient has no concerns about alcohol or drug use.    Reason for visit:  Hemoroih    He eats 0-1 servings of fruits and vegetables daily.He consumes 1 sweetened beverage(s) daily.He exercises with enough effort to increase his heart rate 9 or less minutes per day.  He exercises with enough effort to increase his heart rate 3 or less days per week.      Hemorrhoids  Onset/Duration: a few years  Description:   Celina-anal lump: No  Pain: YES  Itching: Yes  Accompanying Signs & Symptoms:  Blood in stool: YES- not all the time  Changes in stool pattern: No  History:   Any previous GI studies done:none  Family History of colon cancer: No  Precipitating factors:   None  Alleviating factors:  None  Therapies tried and outcome: none    Patient has concern with hemorrhoids. History of internal and external  "hemorrhoids, has had banding done in the past. They bother him every time he has a bowel movement. Intermittent pain. Occasional spotting of blood on toilet paper. He reports that he has a BM daily and stools are typically soft and easy to pass. Interested in surgical removal, would also like medication for alleviating pain. Has previously used Anusol cream and suppositories.     Urinating 2-3 times a night. Present about 1 year. No dribbling, hesistancy. Taking tamsulosin intermittently. Advised taking daily.     Review of Systems   Constitutional, HEENT, cardiovascular, pulmonary, gi and gu systems are negative, except as otherwise noted.      Objective    /64 (BP Location: Left arm, Patient Position: Chair, Cuff Size: Adult Regular)   Pulse 81   Temp 97.9  F (36.6  C) (Oral)   Resp 20   Ht 1.629 m (5' 4.13\")   Wt 58.5 kg (129 lb)   SpO2 99%   BMI 22.05 kg/m    Body mass index is 22.05 kg/m .  Physical Exam   GENERAL: healthy, alert and no distress  RESP: lungs clear to auscultation - no rales, rhonchi or wheezes  CV: regular rate and rhythm, normal S1 S2, no S3 or S4, no murmur, click or rub, no peripheral edema and peripheral pulses strong  ABDOMEN: soft, nontender, no hepatosplenomegaly, no masses and bowel sounds normal  RECTAL: 1 cm perianal skin tag. No current protruding or thrombosed hemorrhoid, fissure, or bleeding noted.   MS: no gross musculoskeletal defects noted, no edema  "

## 2023-12-05 ENCOUNTER — TELEPHONE (OUTPATIENT)
Dept: FAMILY MEDICINE | Facility: CLINIC | Age: 53
End: 2023-12-05
Payer: COMMERCIAL

## 2023-12-05 NOTE — TELEPHONE ENCOUNTER
Plan does not cover hydrocortisone, Perianal, (HYDROCORTISONE) 2.5 % cream .  Please call [phone] to initiate Prior Auth or switch to alternative medication.    Insurance type and ID number: ID# [id number]      Additional Information:     Julia Quinn

## 2023-12-07 NOTE — TELEPHONE ENCOUNTER
Prior Authorization Approval    Authorization Effective Date: 11/7/2023  Authorization Expiration Date: 12/6/2024  Medication: hydrocortisone, Perianal, (HYDROCORTISONE) 2.5 % cream  Approved Dose/Quantity:   Reference #:     Insurance Company: [employer] - Phone [phone] Fax [phone]  Expected CoPay:       CoPay Card Available:      Foundation Assistance Needed:    Which Pharmacy is filling the prescription (Not needed for infusion/clinic administered): [id number] - [place], [place] - [address] [facility]  Pharmacy Notified:  yes  Patient Notified:  yes- Pharmacy will contact patient when ready to /ship

## 2023-12-07 NOTE — TELEPHONE ENCOUNTER
Central Prior Authorization Team   Phone: [phone]    PA Initiation    Medication: hydrocortisone, Perianal, (HYDROCORTISONE) 2.5 % cream  Insurance Company: [employer] - Phone [phone] Fax [phone]  Pharmacy Filling the Rx: [id number] - [place], [place] - [address] AT [facility] [address] [place]  Filling Pharmacy Phone: [phone]  Filling Pharmacy Fax:    Start Date: 12/7/2023

## 2024-01-11 NOTE — TELEPHONE ENCOUNTER
Diagnosis, Referred by & from: Hemorrhoids   Appt date: 4/2/2024   NOTES STATUS DETAILS   OFFICE NOTE from referring provider [facility] - Johana nieto:  1/29/24, 12/4/23 - PCC OV with Mercedes Sharpe NP   OFFICE NOTE from other specialist [facility] - Javon:  7/6/22 - PCC OV with Dr. Fink    [facility] Maida:  12/22/14 - GEN SURG OV with Dr. Freeman   DISCHARGE SUMMARY from hospital N/A    DISCHARGE REPORT from the ER N/A    OPERATIVE REPORT N/A    MEDICATION LIST Internal    LABS N/A    DIAGNOSTIC PROCEDURES N/A    IMAGING (DISC & REPORT)      CT [facility]:  8/21/14 - CT Abdomen

## 2024-01-29 ENCOUNTER — OFFICE VISIT (OUTPATIENT)
Dept: FAMILY MEDICINE | Facility: CLINIC | Age: 54
End: 2024-01-29
Payer: COMMERCIAL

## 2024-01-29 VITALS
HEIGHT: 64 IN | TEMPERATURE: 97.6 F | HEART RATE: 79 BPM | SYSTOLIC BLOOD PRESSURE: 118 MMHG | DIASTOLIC BLOOD PRESSURE: 72 MMHG | WEIGHT: 131.4 LBS | BODY MASS INDEX: 22.43 KG/M2 | RESPIRATION RATE: 16 BRPM | OXYGEN SATURATION: 100 %

## 2024-01-29 DIAGNOSIS — E11.9 TYPE 2 DIABETES MELLITUS WITHOUT COMPLICATION, WITHOUT LONG-TERM CURRENT USE OF INSULIN (H): ICD-10-CM

## 2024-01-29 DIAGNOSIS — Z12.11 SCREEN FOR COLON CANCER: Primary | ICD-10-CM

## 2024-01-29 DIAGNOSIS — J43.2 CENTRILOBULAR EMPHYSEMA (H): ICD-10-CM

## 2024-01-29 DIAGNOSIS — N40.1 BENIGN PROSTATIC HYPERPLASIA WITH URINARY FREQUENCY: ICD-10-CM

## 2024-01-29 DIAGNOSIS — E78.5 HYPERLIPIDEMIA LDL GOAL <100: ICD-10-CM

## 2024-01-29 DIAGNOSIS — K64.4 EXTERNAL HEMORRHOIDS: ICD-10-CM

## 2024-01-29 DIAGNOSIS — H02.403 PTOSIS OF BOTH EYELIDS: ICD-10-CM

## 2024-01-29 DIAGNOSIS — R35.0 BENIGN PROSTATIC HYPERPLASIA WITH URINARY FREQUENCY: ICD-10-CM

## 2024-01-29 PROCEDURE — 99214 OFFICE O/P EST MOD 30 MIN: CPT | Mod: 25

## 2024-01-29 PROCEDURE — 90471 IMMUNIZATION ADMIN: CPT

## 2024-01-29 PROCEDURE — 90750 HZV VACC RECOMBINANT IM: CPT

## 2024-01-29 RX ORDER — HYDROCORTISONE 25 MG/G
CREAM TOPICAL 2 TIMES DAILY PRN
Qty: 28 G | Refills: 3 | Status: SHIPPED | OUTPATIENT
Start: 2024-01-29 | End: 2024-04-16

## 2024-01-29 ASSESSMENT — PAIN SCALES - GENERAL: PAINLEVEL: NO PAIN (0)

## 2024-01-29 NOTE — PROGRESS NOTES
Assessment & Plan     Screen for colon cancer  - Patient interested in colonoscopy for colon cancer screening. Has not previously been screened.   - Discussed procedure and preparation, risks and benefits of screening.   - Colonoscopy Screening  Referral    Type 2 diabetes mellitus without complication, without long-term current use of insulin (H)  - Known condition taken into account for medical decision making, no current exacerbation or new concerns.    External hemorrhoids  - Hydrocortisone cream has been effective. Refilled.   - hydrocortisone, Perianal, (HYDROCORTISONE) 2.5 % cream  Dispense: 28 g; Refill: 3    Ptosis of both eyelids  - Patient reports bilateral ptosis with mild interference with vision. Referred for consultation for blepharoplasty.   - Adult Eye  Referral    Centrilobular emphysema (H)  - Noted on screening lung CT in July 2022. No respiratory symptoms. Patient has quit smoking.     Hyperlipidemia  - Discussed with patient. He reports he takes atorvastatin inconsistently due to forgetting to take medication. Reviewed compliance strategies.  - Reviewed 10-year ASCVD risk- 11.4%. Continue daily aspirin.     BPH with urinary frequency  - Advised compliance with tamsulosin. Symptoms stable. Normal PSA.     Due for diabetes follow up in three months.     Ashley Salazar is a 53 year old, presenting for the following health issues:  colonoscopy questions        1/29/2024     8:43 AM   Additional Questions   Roomed by Brisa         1/29/2024     8:43 AM   Patient Reported Additional Medications   Patient reports taking the following new medications none     History of Present Illness       Reason for visit:  All kind    He eats 4 or more servings of fruits and vegetables daily.He consumes 2 sweetened beverage(s) daily.He exercises with enough effort to increase his heart rate 9 or less minutes per day.  He exercises with enough effort to increase his heart rate 3 or less days  "per week.   He is not taking prescribed medications regularly due to other.     Patient reports that he has been having problems with gas, with bloating and discomfort. History of reflux. No N/V, constipation, diarrhea.     Advised compliance with medication. Patient reports that he takes his atorvastatin and tamsulosin inconsistently as he forgets. He does report urinary frequency at night.     The 10-year ASCVD risk score (Lauren BOLTON, et al., 2019) is: 11.4%    Values used to calculate the score:      Age: 53 years      Sex: Male      Is Non- : No      Diabetic: Yes      Tobacco smoker: No      Systolic Blood Pressure: 118 mmHg      Is BP treated: No      HDL Cholesterol: 47 mg/dL      Total Cholesterol: 232 mg/dL    Review of Systems  Constitutional, HEENT, cardiovascular, pulmonary, gi and gu systems are negative, except as otherwise noted.      Objective    /72 (BP Location: Left arm, Patient Position: Sitting, Cuff Size: Adult Regular)   Pulse 79   Temp 97.6  F (36.4  C) (Oral)   Resp 16   Ht 1.627 m (5' 4.06\")   Wt 59.6 kg (131 lb 6.4 oz)   SpO2 100%   BMI 22.52 kg/m    Body mass index is 22.52 kg/m .    Physical Exam   GENERAL: alert and no distress  NECK: no adenopathy, no asymmetry, masses, or scars  RESP: lungs clear to auscultation - no rales, rhonchi or wheezes  CV: regular rate and rhythm, normal S1 S2, no S3 or S4, no murmur, click or rub, no peripheral edema  ABDOMEN: soft, nontender, no hepatosplenomegaly, no masses and bowel sounds normal  MS: no gross musculoskeletal defects noted, no edema  PSYCH: mentation appears normal, affect normal/bright    Signed Electronically by: TONI Flowers CNP    "

## 2024-01-29 NOTE — COMMUNITY RESOURCES LIST (ENGLISH)
01/29/2024   [facility]  N/A  For questions about this resource list or additional care needs, please contact your primary care clinic or care manager.  Phone: [phone]   Email: N/A   Address: [facility] [place] [address]   Hours: N/A        Hotlines and Helplines       Hotline - Housing crisis  1  [facility] Distance: 8.13 miles      Phone/Virtual   [address] [place] [address]  Language: English  Hours: Mon - Sun Open 24 Hours  Fees: Free   Phone: (586) 732-9356 Email: info@[employer]olaf.org Website: http://www.saintolaf.org/     2  [facility] Distance: 8.84 miles      Phone/Virtual   [address] [place] [address]  Language: English  Hours: Mon - Sun Open 24 Hours   Phone: (756) 111-4538 Email: info@[employer] Website: http://www.[employer].org          Housing       Coordinated Entry access point  3  [facility]  [facility] Distance: 5.7 miles      Phone/Virtual   [address] [place] [address]  Language: English  Hours: Mon - Fri 8:30 AM - 12:00 PM , Mon - Fri 1:00 PM - 4:00 PM  Fees: Free   Phone: (433) 958-4829 Ext.2 Email: bassem@[facility].[employer].org Website: https://www.[employer].org/usn/     4  [facility] Distance: 7.54 miles      In-Person, Phone/Virtual   [address] [place] [address]  Language: English, Uzbek  Hours: Mon - Fri 10:00 AM - 5:30 PM , Mon - Sun 7:30 PM - 10:20 PM , Sat - Sun 1:00 PM - 5:30 PM  Fees: Free   Phone: (814) 251-5439 Email: info@[employer] Website: https://www.[employer].org/our-programs/adult-shelter-connect-domingo-shelter/     Drop-in center or day shelter  5  [facility] Distance: 7.33 miles      In-Person   [address] [place] [address]  Language: English, Hmong, Citizen of [place], Uzbek  Hours: Mon - u 8:30 AM - 4:30 PM , Sat - Sun 9:00 AM - 12:00 PM  Fees:  Free   Phone: (345) 857-3624 Email: info@"[employer] Website: https://"[employer]".org/     6  [facility] Distance: 8.77 miles      In-Person   [address] [place] [address]  Language: English, Barbadian, Ugandan  Hours: Mon - Sat 7:00 AM - 3:00 PM  Fees: Free, Self Pay   Phone: (440) 936-6960 Email: info@[employer] Website: https://www.[employer].org/locations/opportunity-[place]/     Housing search assistance  7  [facility] ([employer]) Distance: 1.69 miles      Phone/Virtual   [address] [place] [address]  Language: English, Ugandan  Hours: Mon - Fri 9:00 AM - 5:00 PM  Fees: Free   Phone: (652) 209-5136 Email: services@[employer] Website: https://www.eshtery     8  [facility] ([facility] Distance: 2.8 miles      In-Person   [address] [place] [address]  Language: English  Hours: Mon - Fri 8:00 AM - 4:00 PM  Fees: Free   Phone: (135) 565-5008 Email: info@[facility].[employer] Website: https://[employer].org/     Shelter for families  9  [facility] Distance: 17.35 miles      In-Person   [address] [place] [address]  Language: English  Hours: Mon - Fri 3:00 PM - 9:00 AM , Sat - Sun Open 24 Hours  Fees: Free   Phone: (934) 365-8961 Ext.1 Website: https://www.saintandrews.org/2020/07/03/emergency-family-shelter/     Shelter for individuals  10  [facility] Distance: 7.59 miles      In-Person   [address] [place] [address]  Language: English  Hours: Mon - Sun 5:00 PM - 10:00 AM  Fees: Free, Self Pay   Phone: (240) 783-4420 Email: info@[employer].org Website: https://www.[employer].org/locations/higher-ground-shelter/     11  [facility] Distance: 7.71 miles      In-Person    [address] [place] [address]  Language: English  Hours: Mon - Fri 4:00 PM - 9:00 AM  Fees: Free   Phone: (857) 524-3509 Email: constanza@[facility].[employer].org Website: https://centralusa.[facility].org/northern/[facility]/          Important Numbers & Websites       Emergency Services   911  [facility] Services   311  Poison Control   (961) 977-4103  Suicide Prevention Lifeline   (213) 988-7325 (TALK)  Child Abuse Hotline   (670) 927-4880 (4-A-Child)  Sexual Assault Hotline   (976) 246-3171 (HOPE)  National Runaway Safeline   (388) 465-6703 (RUNAWAY)  All-Options Talkline   (346) 172-8159  Substance Abuse Referral   (422) 835-2592 (HELP)

## 2024-01-29 NOTE — NURSING NOTE
Prior to immunization administration, verified patients identity using patient s name and date of birth. Please see Immunization Activity for additional information.     Screening Questionnaire for Adult Immunization    Are you sick today?   No   Do you have allergies to medications, food, a vaccine component or latex?   No   Have you ever had a serious reaction after receiving a vaccination?   No   Do you have a long-term health problem with heart, lung, kidney, or metabolic disease (e.g., diabetes), asthma, a blood disorder, no spleen, complement component deficiency, a cochlear implant, or a spinal fluid leak?  Are you on long-term aspirin therapy?   No   Do you have cancer, leukemia, HIV/AIDS, or any other immune system problem?   No   Do you have a parent, brother, or sister with an immune system problem?   No   In the past 3 months, have you taken medications that affect  your immune system, such as prednisone, other steroids, or anticancer drugs; drugs for the treatment of rheumatoid arthritis, Crohn s disease, or psoriasis; or have you had radiation treatments?   No   Have you had a seizure, or a brain or other nervous system problem?   No   During the past year, have you received a transfusion of blood or blood    products, or been given immune (gamma) globulin or antiviral drug?   No   For women: Are you pregnant or is there a chance you could become       pregnant during the next month?   No   Have you received any vaccinations in the past 4 weeks?   No     Immunization questionnaire answers were all negative.      Patient instructed to remain in clinic for 15 minutes afterwards, and to report any adverse reactions.     Screening performed by Celi Aguero on 1/29/2024 at 9:36 AM.

## 2024-02-02 ENCOUNTER — TELEPHONE (OUTPATIENT)
Dept: GASTROENTEROLOGY | Facility: CLINIC | Age: 54
End: 2024-02-02
Payer: COMMERCIAL

## 2024-02-02 DIAGNOSIS — Z12.11 SPECIAL SCREENING FOR MALIGNANT NEOPLASMS, COLON: Primary | ICD-10-CM

## 2024-02-02 NOTE — TELEPHONE ENCOUNTER
"Endoscopy Scheduling Screen    Have you had a positive Covid test in the last 14 days?  No    Are you active on MyChart?   No    What insurance is in the chart?  Other:  [facility]    Ordering/Referring Provider:     DWIGHT BAZAN      (If ordering provider performs procedure, schedule with ordering provider unless otherwise instructed. )    BMI: Estimated body mass index is 22.52 kg/m  as calculated from the following:    Height as of 1/29/24: 1.627 m (5' 4.06\").    Weight as of 1/29/24: 59.6 kg (131 lb 6.4 oz).     Sedation Ordered  moderate sedation.   If patient BMI > 50 do not schedule in [facility].    If patient BMI > 45 do not schedule at [facility].    Are you taking methadone or Suboxone?  No    Have you had difficulties, pain, or discomfort during past endoscopy procedures?  No    Are you taking any prescription medications for pain 3 or more times per week?   NO - No RN review required.    Do you have a history of malignant hyperthermia or adverse reaction to anesthesia?  No    (Females) Are you currently pregnant?        Have you been diagnosed or told you have pulmonary hypertension?   No    Do you have an LVAD?  No    Have you been told you have moderate to severe sleep apnea?  No    Have you been told you have COPD, asthma, or any other lung disease?  No    Do you have any heart conditions?  No     Have you ever had an organ transplant?   No    Have you ever had or are you awaiting a heart or lung transplant?   No    Have you had a stroke or transient ischemic attack (TIA aka \"mini stroke\" in the last 6 months?   No    Have you been diagnosed with or been told you have cirrhosis of the liver?   No    Are you currently on dialysis?   No    Do you need assistance transferring?   No    BMI: Estimated body mass index is 22.52 kg/m  as calculated from the following:    Height as of 1/29/24: 1.627 m (5' 4.06\").    Weight as of 1/29/24: 59.6 kg (131 lb 6.4 oz).     Is patients BMI > 40 and scheduling location " [facility]?  No    Do you take an injectable medication for weight loss or diabetes (excluding insulin)?  No    Do you take the medication Naltrexone?  No    Do you take blood thinners?  No       Prep   Are you currently on dialysis or do you have chronic kidney disease?  No    Do you have a diagnosis of diabetes?  Yes (Golytely Prep)    Do you have a diagnosis of cystic fibrosis (CF)?  No    On a regular basis do you go 3 -5 days between bowel movements?  No    BMI > 40?  No    Preferred Pharmacy:    [id number] - [place], [place] - [address] [facility]  [address] [facility] [address]  Phone: [phone] Fax: [phone]      Final Scheduling Details   Colonoscopy prep sent?  Standard Golytely    Procedure scheduled  Colonoscopy    Surgeon:  HOWARD     Date of procedure:  4/11     Pre-OP / PAC:   No - Not required for this site.    Location  [facility] - Per order.    Sedation   Moderate Sedation - Per order.      Patient Reminders:   You will receive a call from a Nurse to review instructions and health history.  This assessment must be completed prior to your procedure.  Failure to complete the Nurse assessment may result in the procedure being cancelled.      On the day of your procedure, please designate an adult(s) who can drive you home stay with you for the next 24 hours. The medicines used in the exam will make you sleepy. You will not be able to drive.      You cannot take public transportation, ride share services, or non-medical taxi service without a responsible caregiver.  Medical transport services are allowed with the requirement that a responsible caregiver will receive you at your destination.  We require that drivers and caregivers are confirmed prior to your procedure.

## 2024-02-02 NOTE — LETTER
March 25, 2024      Martin Salvador  [address]  [facility] [place] [address]              Dear Martin,    Here's your colonoscopy prep instructions:  STANDARD Golytely (Colyte, Nulytely)  Prep Instructions for your Colonoscopy  Pre-Assessment Phone Number: [facility]; [phone] option 4      Please read these instructions carefully at least 7 days prior to your colonoscopy procedure. Be sure to follow all directions completely. The inside of your colon must be clean to allow for a complete examination for the presence of any growths, polyps, and/or abnormalities, as well as their biopsy or removal. A number of tips are included in order to make this part of the procedure as comfortable as possible.    Getting ready:   A nurse will call you to go over instructions and your health history.  It's important to complete the nurse assessment before your procedure. If you don't, your procedure might have to be canceled.  You must arrange for an adult to drive you home after your exam. Your colonoscopy cannot be done unless you have a ride. If you need to use public transportation, someone must ride with you and stay with you for a minimum of 6-24 hours.  Check with your insurance company to be sure they will cover this exam.  If you have diabetes:  Ask to have your exam early in the morning.  Also, ask your doctor if you should change your diet or medicines.    7 days before the exam:  Talk to your prescribing provider: If you take blood thinners (such as Coumadin, Plavix, Xarelto, Eliquis, Lovenox, or others), these medications may need to be stopped temporarily before your procedure. Your prescribing provider will tell you what to do.   Talk to your prescribing provider: If you take prescription NSAIDS (such as Sulindac, Celebrex, Mobic, Relafen). Your prescribing provider will tell you what to do.   Stop taking fiber supplements (bran, Metamucil, Fibercon), multi-vitamins with iron, and  medicines that contain iron.  Continue taking prescribed aspirin; talk to your prescribing doctor with any concerns.  Stop eating corn, popcorn, nuts and foods that contain seeds. These can stay in the colon for many days and they can clog up the colonoscope.     3 days before the exam:  Begin a low-fiber diet: No raw fruits or vegetables, whole wheat, seeds, nuts, popcorn or other high-fiber foods (see list below). No Olestra (a fat substitute).    One day before the exam:  You can have a light, low-fiber breakfast. But drink only clear liquids after 9 a.m. (see list below). Drink at least 8 to 10 full glasses of clear liquids during the day.   Stop taking NSAID pain relievers, such as Advil, Ibuprofen, Motrin, etc.  You may take Tylenol.  Fill the jug that contains the Golytely powder with warm water. Cover and shake until well mixed. Use a full gallon of water. Chill for 3 hours, but do not add ice.  Note: You will start drinking half of the Golytely solution at 6 p.m. The timing of drinking the 2nd half of the Golytely solution depends on your appointment arrival time. See Steps 1-2 below.    Step One:  At 3 p.m., take 2 tablets of Dulcolax (bisacodyl).  At 6 p.m. start drinking the Golytely solution. Drink an 8-ounce glass every 15 minutes until the jug is half empty. Drink each glass quickly.   After you start drinking the solution, stay near a toilet. You may have watery stools (diarrhea), mild cramping, bloating , and nausea.   You may want to use Vaseline on the skin around your anus after each bowel movement to prevent irritation. You can also use wet wipes to prevent irritation.    Step Two:   If you arrive before 11 AM:  At 11 p.m. on the day before your exam:  Take 2 Dulcolax (Bisacodyl) tablets.   Start drinking the other half of the Golytely jug. Drink one 8-ounce glass every 15 minutes until the jug is empty. Drink each glass quickly.  If you arrive after 11 AM:  At 6 AM on the day of the exam:  Take  2 tablets of Dulcolax (Bisacodyl).   Drink the other half of the Golytely jug.   Drink one 8-ounce glass every 15 minutes until the jug is empty.   Drink each glass quickly.   You should finish the prep 4 hours before the exam.      Day of exam:    You may drink clear liquids only up until 2 hours before your arrival time.  You may take your necessary morning medications with sips of water  Do not take diabetes medicine by mouth until after your exam.  Do not smoke, chew tobacco, or swallow anything, including water or gum for at least 2 hours before your arrival time. This is a safety issue. Your procedure could be cancelled if you do not follow directions.  Please do not wear jewelry (i.e. earrings, rings, necklaces, watches, etc) . Leave your purse, billfold, credit cards, and other valuables at home.   Please arrive with a responsible adult who can take you home after the test and stay with you for a minimum of 24 hours: The medicine used will make you sleepy and forgetful. If you do not have someone to take you home, we will cancel your procedure. If using public transportation you must have someone to ride with you.  Please perform your nebulizer treatments and airway clearance therapy in the morning prior to the procedure (if applicable).  If you have asthma, bring your inhalers.      CLEAR LIQUID DIET   You may have:  Water, tea, coffee (no milk or cream)  Soda pop, Gatorade (not red or purple)  Jell-O, Popsicles (no milk or fruit pieces - not red or purple)  Fat-free soup broth or bouillon  Plain hard candy, such as clear life savers (not red or purple)  Clear juices and fruit-flavored drinks, such as apple juice, white grape juice, Hi-C, and Ney-Aid (not red or purple)   Do not have:  Milk or milk products such as ice cream, malts or shakes, or coffee creamer  Red or purple drinks of any kind such as cranberry juice or grape juice. Avoid red or purple Jell-O, Popsicles, Ney-Aid, sorbet, sherbet and  candy  Juices with pulp such as orange, grapefruit, pineapple or tomato juice  Cream soups of any kind  Alcohol and beer  Protein drinks or protein powder     LOW FIBER DIET   You may have:    Starches: White bread, rolls, biscuits, croissants, [place] toast, white flour tortillas, waffles, pancakes, Slovak toast; white rice, noodles, pasta, macaroni; cooked and peeled potatoes; plain crackers, saltines; cooked farina or cream of rice; puffed rice, corn flakes, Rice Krispies, Special K   Vegetables: tender cooked and canned, vegetable broths  Fruits and fruit juices: Strained fruit juice, canned fruit without seeds or skin (not pineapple), applesauce, pear sauce, ripe bananas, melons (not watermelon)   Milk products: Milk (plain or flavored), cheese, cottage cheese, yogurt (no berries), custard, ice cream    Proteins: Tender, well-cooked ground beef, lamb, veal, ham, pork, chicken, turkey, fish or organ meats; eggs; creamy peanut butter   Fats and condiments:  Margarine, butter, oils, mayonnaise, sour cream, salad dressing, plain gravy; spices, cooked herbs; sugar, clear jelly, honey, syrup   Snacks, sweets and drinks: Pretzels, hard candy; plain cakes and cookies (no nuts or seeds); gelatin, plain pudding, sherbet, Popsicles; coffee, tea, carbonated ( fizzy ) drinks Do not have:    Starches: Breads or rolls that contain nuts, seeds or fruit; whole wheat or whole grain breads that contain more than 1 gram of fiber per slice; cornbread; corn or whole wheat tortillas; potatoes with skin; brown rice, wild rice, kasha (buckwheat), and oatmeal   Vegetables: Any raw or steamed vegetables; vegetables with seeds; corn in any form   Fruits and fruit juices: Prunes, prune juice, raisins and other dried fruits, berries and other fruits with seeds, canned pineapple juices with pulp such as orange, grapefruit, pineapple or tomato juice  Milk products: Any yogurt with nuts, seeds or berries   Proteins: Tough, fibrous meats with  gristle; cooked dried beans, peas or lentils; crunchy peanut butter  Fats and condiments: Pickles, olives, relish, horseradish; jam, marmalade, preserves   Snacks, sweets and drinks: Popcorn, nuts, seeds, granola, coconut, candies made with nuts or seeds; all desserts that contain nuts, seeds, raisins and other dried fruits, coconut, whole grains or bran.        FAQ:    How do you know if your colon is cleaned out?   After completing the bowel prep, your bowel movements should be all liquid and yellow. Your bowel movements will look similar to urine in the toilet. If there are pieces of stool (poop) in the toilet, or if you can't see to the bottom of the toilet, please call our office for advice. Call [phone] and ask to speak with a nurse.   Why do you need a responsible  to take you home and stay with you?  We require a responsible adult to take you home for your safety. The sedation medicines used to relax you during the procedure can impair your judgement and reaction time, make you forgetful and possible a little unsteady. Do not drive, make any important decisions, or sign any legal documents for 24 hours after your procedure.   It is normal to feel bloated and gassy after your procedure. Walking will help move the air through your colon. You can take non-aspirin pain relievers that contain acetaminophen (Tylenol).   When can you eat after your procedure?  You may resume your normal diet when you feel ready, unless advised otherwise by the doctor performing your procedure. Do not drink alcohol for 24 hours after your procedure.   You many resume normal activities (work, exercise, etc.) after 24 hours.   When will you get test results?  You should have your procedure results and any lab results (if applicable) by letter, MyChart message, or phone call within 2 weeks. If you have any questions, please call the doctor that referred you for the procedure.       Thank you for choosing [facility]  for your procedure. If you are sent a survey regarding your care, please take the time to complete the questionnaire. We value your feedback!             Updated: 6/22/2022

## 2024-02-11 DIAGNOSIS — E11.9 TYPE 2 DIABETES MELLITUS WITHOUT COMPLICATION, WITHOUT LONG-TERM CURRENT USE OF INSULIN (H): ICD-10-CM

## 2024-03-13 ENCOUNTER — OFFICE VISIT (OUTPATIENT)
Dept: OPHTHALMOLOGY | Facility: CLINIC | Age: 54
End: 2024-03-13
Payer: COMMERCIAL

## 2024-03-13 DIAGNOSIS — H02.403 PTOSIS OF BOTH EYELIDS: ICD-10-CM

## 2024-03-13 DIAGNOSIS — H02.834 DERMATOCHALASIS OF BOTH UPPER EYELIDS: Primary | ICD-10-CM

## 2024-03-13 DIAGNOSIS — H02.831 DERMATOCHALASIS OF BOTH UPPER EYELIDS: Primary | ICD-10-CM

## 2024-03-13 PROCEDURE — 92081 LIMITED VISUAL FIELD XM: CPT | Performed by: OPHTHALMOLOGY

## 2024-03-13 PROCEDURE — 92285 EXTERNAL OCULAR PHOTOGRAPHY: CPT | Performed by: OPHTHALMOLOGY

## 2024-03-13 PROCEDURE — 99204 OFFICE O/P NEW MOD 45 MIN: CPT | Performed by: OPHTHALMOLOGY

## 2024-03-13 ASSESSMENT — CONF VISUAL FIELD
OS_INFERIOR_NASAL_RESTRICTION: 0
OD_SUPERIOR_NASAL_RESTRICTION: 0
OD_INFERIOR_TEMPORAL_RESTRICTION: 0
OS_INFERIOR_TEMPORAL_RESTRICTION: 0
OD_INFERIOR_NASAL_RESTRICTION: 0
OS_NORMAL: 1
OD_NORMAL: 1
OS_SUPERIOR_NASAL_RESTRICTION: 0
OS_SUPERIOR_TEMPORAL_RESTRICTION: 0
OD_SUPERIOR_TEMPORAL_RESTRICTION: 0
METHOD: COUNTING FINGERS

## 2024-03-13 ASSESSMENT — SLIT LAMP EXAM - LIDS
COMMENTS: HEAVY DERMATOCHALASIS RESTING ON LASHES, TRUE PTOSIS
COMMENTS: HEAVY DERMATOCHALASIS RESTING ON LASHES, TRUE PTOSIS

## 2024-03-13 ASSESSMENT — TONOMETRY
IOP_METHOD: ICARE
OS_IOP_MMHG: 15
OD_IOP_MMHG: 16

## 2024-03-13 ASSESSMENT — VISUAL ACUITY
OS_CC+: -1
CORRECTION_TYPE: GLASSES
METHOD: SNELLEN - LINEAR
OS_CC: 20/20
OD_CC+: -1
OD_CC: 20/20

## 2024-03-13 NOTE — NURSING NOTE
Chief Complaints and History of Present Illnesses   Patient presents with    Dermatochalasis Evaluation     Chief Complaint(s) and History of Present Illness(es)       Dermatochalasis Evaluation              Laterality: right upper lid and left upper lid    Severity: moderate    Course: stable    Associated signs and symptoms: Negative for blurred vision and double vision              Comments    Here for dermatochalasis evaluation both upper eyelids. [facility] is doing okay. Peripheral vision is affected by lids and is bothersome. No diplopia. No pain.    Isreal JACQUES 10:09 AM March 13, 2024

## 2024-03-13 NOTE — LETTER
3/13/2024         RE:  :  MRN: Martin Salvador  1970  [id number]     Dear Dr. Mercedes Sharpe,    Thank you for asking me to see your patient, Martin Salvador, for an oculoplastic   consultation.  My assessment and plan are below.  For further details, please see my attached clinic note.           HPI:     Chief Complaint(s) and History of Present Illness(es)     Dermatochalasis Evaluation            Laterality: right upper lid and left upper lid    Severity: moderate    Course: stable    Associated signs and symptoms: Negative for blurred vision and double   vision          Comments    Here for dermatochalasis evaluation both upper eyelids. VA is doing okay.   Peripheral vision is affected by lids and is bothersome. No diplopia. No   pain.    Isreal Bibi COT 10:09 AM 2024     Referred by Mercedes Sharpe, TONI CNP.   Regular eye doctor Ajit Thomas, DIONISIO       Martin Salvador is a 53 year old male who has noted gradual onset of droopy eyelids over the past years. The droopy eyelid is interfering with activities of daily living including driving, and reading. The patient denies double vision, variability of the eyelid position. Sometimes his eyes feel tired and dry.    Quit smoking maybe 6-7 years ago    EXAM:     MRD1: 1 mm  Dermatochalasis with excess skin touching eyelashes and aponeurotic ptosis       VISUAL FIELD:  Right eye untaped:15 degrees Right eye taped:45 degrees  Left eye untaped:20 degrees Left eye taped:50 degrees    Lab Results   Component Value Date    A1C 6.2 2023    A1C 6.0 2023    A1C 5.9 2022    A1C 6.1 2021    A1C 6.5 2020    A1C 6.0 2019    A1C 6.0 2018    A1C 6.2 2018       Assessment & Plan     Martin Salvador is a 53 year old male with the following diagnoses:   1. Dermatochalasis of both upper eyelids    2. Ptosis of both eyelids         Both upper eyelid blepharoplasty and levator advancement ptosis repair. (S, conservative ncf, levator  adv - check intra-op, crease fixation).     The heavy upper eyelids are causing symptoms as documented above. The condition is not secondary to underlying Sjogren's, myasthenia gravis, or polymyositis.   ANTICOAGULATION:  Aspirin, can  hold         Again, thank you for allowing me to participate in the care of your patient.      Sincerely,    Savannah Henderson MD  Department of Ophthalmology and Visual Neurosciences  [facility]    CC: TONI Flowers CNP  [address] [facility] [address]  Via In [facility]  [address] [address] [address]  Via Fax: [phone]

## 2024-03-13 NOTE — PROGRESS NOTES
Oculoplastic Clinic New Patient    Patient: Martin Salvador MRN# [id number]   YOB: 1970 Age: 53 year old   Date of Visit: Mar 13, 2024    CC: Droopy eyelids obstructing vision.              HPI:     Chief Complaint(s) and History of Present Illness(es)     Dermatochalasis Evaluation            Laterality: right upper lid and left upper lid    Severity: moderate    Course: stable    Associated signs and symptoms: Negative for blurred vision and double   vision          Comments    Here for dermatochalasis evaluation both upper eyelids. VA is doing okay.   Peripheral vision is affected by lids and is bothersome. No diplopia. No   pain.    Isreal Mtz COT 10:09 AM March 13, 2024     Referred by TONI Flowers CNP.   Regular eye doctor Ajit Thomas OD       Martin Salvador is a 53 year old male who has noted gradual onset of droopy eyelids over the past years. The droopy eyelid is interfering with activities of daily living including driving, and reading. The patient denies double vision, variability of the eyelid position. Sometimes his eyes feel tired and dry.    Quit smoking maybe 6-7 years ago    EXAM:     MRD1: 1 mm  Dermatochalasis with excess skin touching eyelashes and aponeurotic ptosis       VISUAL FIELD:  Right eye untaped:15 degrees Right eye taped:45 degrees  Left eye untaped:20 degrees Left eye taped:50 degrees    Lab Results   Component Value Date    A1C 6.2 09/14/2023    A1C 6.0 03/17/2023    A1C 5.9 07/06/2022    A1C 6.1 02/17/2021    A1C 6.5 11/27/2020    A1C 6.0 12/31/2019    A1C 6.0 07/30/2018    A1C 6.2 03/14/2018       Assessment & Plan     Martin Salvador is a 53 year old male with the following diagnoses:   1. Dermatochalasis of both upper eyelids    2. Ptosis of both eyelids         Both upper eyelid blepharoplasty and levator advancement ptosis repair. (S, conservative ncf, levator adv - check intra-op, crease fixation).     [id number]    The heavy upper eyelids are causing  symptoms as documented above. The condition is not secondary to underlying Sjogren's, myasthenia gravis, or polymyositis.   ANTICOAGULATION:  Aspirin, can  hold          PHOTOS DEMONSTRATE:    Significant dermatochalasis with lids resting on eyelashes and obstructing visual axis  Blepharoptosis    Attending Physician Attestation: Complete documentation of historical and exam elements from today's encounter can be found in the full encounter summary report (not reduplicated in this progress note). I personally obtained the chief complaint(s) and history of present illness. I confirmed and edited as necessary the review of systems, past medical/surgical history, family history, social history, and examination findings as documented by others; and I examined the patient myself. I personally reviewed the relevant tests, images, and reports as documented above. I formulated and edited as necessary the assessment and plan and discussed the findings and management plan with the patient. Savannah Henderson MD      Today with Martni Salvador I reviewed the indications, risks, benefits, and alternatives of the proposed surgical procedure including, but not limited to, failure obtain the desired result  and need for additional surgery, bleeding, infection, loss of vision, or injury to the eye, dry eyes, and the remote possibility of permanent damage to any organ system or death with the use of anesthesia.  I provided multiple opportunities for the questions, answered all questions to the best of my ability, and confirmed that my answers and my discussion were understood.

## 2024-03-25 RX ORDER — BISACODYL 5 MG/1
TABLET, DELAYED RELEASE ORAL
Qty: 4 TABLET | Refills: 0 | Status: SHIPPED | OUTPATIENT
Start: 2024-03-25 | End: 2024-04-15

## 2024-03-25 NOTE — TELEPHONE ENCOUNTER
Standard Golytely Bowel Prep. Instructions were sent via letter. Bowel prep was sent 3/25/2024 to [id number] - [place], [place] - [address] AT [facility] [address] [place].

## 2024-03-27 ENCOUNTER — TELEPHONE (OUTPATIENT)
Dept: GASTROENTEROLOGY | Facility: CLINIC | Age: 54
End: 2024-03-27
Payer: COMMERCIAL

## 2024-03-27 NOTE — LETTER
March 27, 2024      Martin Salvador  [address]  [facility] [place] [address]              Dear Martin,        Colonoscopy      Procedure date: 4/11/24    Anticipated arrival time: 0855 AM   (Arrival times are subject to change)    Facility location: [facility]; [address] Floor, [place] [address] - Check in location: 2nd Floor at Surgery desk      Important Procedure Reminders:     Prep Instructions:   Instructions on how to prepare for your upcoming procedure are found below. Please read instructions carefully. Deviation from instructions may result in less than desired outcomes and procedure may need to be rescheduled.   If you have additional questions regarding how to prepare for your upcoming procedure, contact our endoscopy pre assessment nurses at [phone] option 4 Monday through Friday 7:00am-5:00pm     Policy:   The medications used during the procedure will make you sleepy, so you won't be able to drive. On the day of your procedure, please have an adult ready to drive you home and stay with you for the next 6 hours.   You can't use public transportation, ride-share services, or non-medical taxi services without a responsible caregiver. Medical transport services are okay, but a caregiver must be there to receive you at your destination.   Make sure your  and caregiver are confirmed before your procedure.      Day of procedure:  Please keep in mind that arrival times may change. Let your  know there might be a one-hour window for changes.  We ask that you please check in at the  with your . Your  should remain on campus.  Expect to be at the procedure center for about 1.5-2.5 hours.    Please do not wear jewelry (i.e. earrings, rings, necklaces, watches, etc). Leave your purse, billfold, credit cards, and other valuables at home.   Bring insurance card and ID.     To cancel or reschedule your procedure:   Within 14  days of your procedure if you develop any flu-like symptoms (such as fever, cough, shortness of breath), COVID-19 like symptoms or exposure to COVID-19, contact our endoscopy team at [phone] option 4 to determine if procedure can be completed or needs to be delayed.   If you need to cancel or reschedule, our endoscopy scheduling team can be reached at [phone], option 2. Monday through Friday, 7:00am-5:00pm.      Medication Reminders:    Please note the following medication holding recommendations:   Oral diabetic medication(s): Metformin (glucophage): HOLD day of procedure.    ----------------------------------------------------------------------------------------------------------------------------------------------------------------------------------------------------------------------------------------------------------------------    STANDARD Golytely (Colyte, Nulytely)  Prep Instructions for your Colonoscopy  Pre-Assessment Phone Number: [facility]; [phone] option 4    Bowel prep has been sent to [id number] - [facility], [place] - [address] AT [facility] [address] [place]    Please read these instructions carefully at least 7 days prior to your colonoscopy procedure. Be sure to follow all directions completely. The inside of your colon must be clean to allow for a complete examination for the presence of any growths, polyps, and/or abnormalities, as well as their biopsy or removal. A number of tips are included in order to make this part of the procedure as comfortable as possible.    Getting ready:   A nurse will call you to go over instructions and your health history.  It's important to complete the nurse assessment before your procedure. If you don't, your procedure might have to be canceled.  You must arrange for an adult to drive you home after your exam. Your colonoscopy cannot be done unless you have a ride. If you need to use public  transportation, someone must ride with you and stay with you for a minimum of 6-24 hours.  Check with your insurance company to be sure they will cover this exam.  If you have diabetes:  Ask to have your exam early in the morning.  Also, ask your doctor if you should change your diet or medicines.    7 days before the exam:  Talk to your prescribing provider: If you take blood thinners (such as Coumadin, Plavix, Xarelto, Eliquis, Lovenox, or others), these medications may need to be stopped temporarily before your procedure. Your prescribing provider will tell you what to do.   Talk to your prescribing provider: If you take prescription NSAIDS (such as Sulindac, Celebrex, Mobic, Relafen). Your prescribing provider will tell you what to do.   Stop taking fiber supplements (bran, Metamucil, Fibercon), multi-vitamins with iron, and medicines that contain iron.  Continue taking prescribed aspirin; talk to your prescribing doctor with any concerns.  Stop eating corn, popcorn, nuts and foods that contain seeds. These can stay in the colon for many days and they can clog up the colonoscope.     3 days before the exam:  Begin a low-fiber diet: No raw fruits or vegetables, whole wheat, seeds, nuts, popcorn or other high-fiber foods (see list below). No Olestra (a fat substitute).    One day before the exam:  You can have a light, low-fiber breakfast. But drink only clear liquids after 9 a.m. (see list below). Drink at least 8 to 10 full glasses of clear liquids during the day.   Stop taking NSAID pain relievers, such as Advil, Ibuprofen, Motrin, etc.  You may take Tylenol.  Fill the jug that contains the Golytely powder with warm water. Cover and shake until well mixed. Use a full gallon of water. Chill for 3 hours, but do not add ice.  Note: You will start drinking half of the Golytely solution at 6 p.m. The timing of drinking the 2nd half of the Golytely solution depends on your appointment arrival time. See Steps 1-2  below.    Step One:  At 3 p.m., take 2 tablets of Dulcolax (bisacodyl).  At 6 p.m. start drinking the Golytely solution. Drink an 8-ounce glass every 15 minutes until the jug is half empty. Drink each glass quickly.   After you start drinking the solution, stay near a toilet. You may have watery stools (diarrhea), mild cramping, bloating , and nausea.   You may want to use Vaseline on the skin around your anus after each bowel movement to prevent irritation. You can also use wet wipes to prevent irritation.    Step Two:   If you arrive before 11 AM:  At 11 p.m. on the day before your exam:  Take 2 Dulcolax (Bisacodyl) tablets.   Start drinking the other half of the Golytely jug. Drink one 8-ounce glass every 15 minutes until the jug is empty. Drink each glass quickly.    Day of exam:    You may drink clear liquids only up until 2 hours before your arrival time.  You may take your necessary morning medications with sips of water  Do not take diabetes medicine by mouth until after your exam.  Do not smoke, chew tobacco, or swallow anything, including water or gum for at least 2 hours before your arrival time. This is a safety issue. Your procedure could be cancelled if you do not follow directions.  Please do not wear jewelry (i.e. earrings, rings, necklaces, watches, etc) . Leave your purse, billfold, credit cards, and other valuables at home.   Please arrive with a responsible adult who can take you home after the test and stay with you for a minimum of 24 hours: The medicine used will make you sleepy and forgetful. If you do not have someone to take you home, we will cancel your procedure. If using public transportation you must have someone to ride with you.  Please perform your nebulizer treatments and airway clearance therapy in the morning prior to the procedure (if applicable).  If you have asthma, bring your inhalers.      CLEAR LIQUID DIET   You may have:  Water, tea, coffee (no milk or cream)  Soda pop,  Gatorade (not red or purple)  Jell-O, Popsicles (no milk or fruit pieces - not red or purple)  Fat-free soup broth or bouillon  Plain hard candy, such as clear life savers (not red or purple)  Clear juices and fruit-flavored drinks, such as apple juice, white grape juice, Hi-C, and Ney-Aid (not red or purple)   Do not have:  Milk or milk products such as ice cream, malts or shakes, or coffee creamer  Red or purple drinks of any kind such as cranberry juice or grape juice. Avoid red or purple Jell-O, Popsicles, Ney-Aid, sorbet, sherbet and candy  Juices with pulp such as orange, grapefruit, pineapple or tomato juice  Cream soups of any kind  Alcohol and beer  Protein drinks or protein powder     LOW FIBER DIET   You may have:    Starches: White bread, rolls, biscuits, croissants, Arin toast, white flour tortillas, waffles, pancakes, Senegalese toast; white rice, noodles, pasta, macaroni; cooked and peeled potatoes; plain crackers, saltines; cooked farina or cream of rice; puffed rice, corn flakes, Rice Krispies, Special K   Vegetables: tender cooked and canned, vegetable broths  Fruits and fruit juices: Strained fruit juice, canned fruit without seeds or skin (not pineapple), applesauce, pear sauce, ripe bananas, melons (not watermelon)   Milk products: Milk (plain or flavored), cheese, cottage cheese, yogurt (no berries), custard, ice cream    Proteins: Tender, well-cooked ground beef, lamb, veal, ham, pork, chicken, turkey, fish or organ meats; eggs; creamy peanut butter   Fats and condiments:  Margarine, butter, oils, mayonnaise, sour cream, salad dressing, plain gravy; spices, cooked herbs; sugar, clear jelly, honey, syrup   Snacks, sweets and drinks: Pretzels, hard candy; plain cakes and cookies (no nuts or seeds); gelatin, plain pudding, sherbet, Popsicles; coffee, tea, carbonated ( fizzy ) drinks Do not have:    Starches: Breads or rolls that contain nuts, seeds or fruit; whole wheat or whole grain breads that  contain more than 1 gram of fiber per slice; cornbread; corn or whole wheat tortillas; potatoes with skin; brown rice, wild rice, kasha (buckwheat), and oatmeal   Vegetables: Any raw or steamed vegetables; vegetables with seeds; corn in any form   Fruits and fruit juices: Prunes, prune juice, raisins and other dried fruits, berries and other fruits with seeds, canned pineapple juices with pulp such as orange, grapefruit, pineapple or tomato juice  Milk products: Any yogurt with nuts, seeds or berries   Proteins: Tough, fibrous meats with gristle; cooked dried beans, peas or lentils; crunchy peanut butter  Fats and condiments: Pickles, olives, relish, horseradish; jam, marmalade, preserves   Snacks, sweets and drinks: Popcorn, nuts, seeds, granola, coconut, candies made with nuts or seeds; all desserts that contain nuts, seeds, raisins and other dried fruits, coconut, whole grains or bran.        FAQ:    How do you know if your colon is cleaned out?   After completing the bowel prep, your bowel movements should be all liquid and yellow. Your bowel movements will look similar to urine in the toilet. If there are pieces of stool (poop) in the toilet, or if you can't see to the bottom of the toilet, please call our office for advice. Call [phone] and ask to speak with a nurse.   Why do you need a responsible  to take you home and stay with you?  We require a responsible adult to take you home for your safety. The sedation medicines used to relax you during the procedure can impair your judgement and reaction time, make you forgetful and possible a little unsteady. Do not drive, make any important decisions, or sign any legal documents for 24 hours after your procedure.   It is normal to feel bloated and gassy after your procedure. Walking will help move the air through your colon. You can take non-aspirin pain relievers that contain acetaminophen (Tylenol).   When can you eat after your procedure?  You may  resume your normal diet when you feel ready, unless advised otherwise by the doctor performing your procedure. Do not drink alcohol for 24 hours after your procedure.   You many resume normal activities (work, exercise, etc.) after 24 hours.   When will you get test results?  You should have your procedure results and any lab results (if applicable) by letter, MyChart message, or phone call within 2 weeks. If you have any questions, please call the doctor that referred you for the procedure.       Thank you for choosing [facility] for your procedure. If you are sent a survey regarding your care, please take the time to complete the questionnaire. We value your feedback!             Updated: 6/22/2022

## 2024-03-28 NOTE — TELEPHONE ENCOUNTER
Pre visit planning completed.      Procedure details:    Patient scheduled for Colonoscopy  on 4/11/24.     Arrival time: 0855. Procedure time 090    Facility location: [facility]; [address] Floor, [place] [address]. Check in location: 2nd Floor at Surgery desk.    Sedation type: Conscious sedation     Pre op exam needed? N/A    Indication for procedure: Screen for colon cancer       Chart review:     Electronic implanted devices? No    Recent diagnosis of diverticulitis within the last 6 weeks? No    Diabetic? Yes. Diabetic medication HOLDING recommendations: Oral diabetic medications: Yes:  Metformin (glucophage): HOLD day of procedure.       Medication review:    Anticoagulants? No    NSAIDS? No    Other medication HOLDING recommendations:  N/A      Prep for procedure:     Bowel prep recommendation: Standard Golytely. Bowel prep prescription sent to [id number] - [place], [place] - [address] [facility] [address] AT [facility]  Due to: diabetes.     Prep instructions sent via [facility]         Corinne Kliber, RN  Endoscopy Procedure Pre Assessment RN  [phone] option 4

## 2024-03-28 NOTE — TELEPHONE ENCOUNTER
Pre assessment completed for upcoming procedure.   (Please see previous telephone encounter notes for complete details)      Procedure details:    Arrival time and facility location reviewed.    Pre op exam needed? N/A    Designated  policy reviewed. Instructed to have someone stay 6  hours post procedure.       Medication review:    Medications reviewed. Please see supporting documentation below. Holding recommendations discussed (if applicable).       Prep for procedure:     Procedure prep instructions reviewed.        Any additional information needed:  N/A      Patient  verbalized understanding and had no questions or concerns at this time.      Anju Suresh RN  Endoscopy Procedure Pre Assessment RN  [phone] option 4

## 2024-04-02 ENCOUNTER — PRE VISIT (OUTPATIENT)
Dept: SURGERY | Facility: CLINIC | Age: 54
End: 2024-04-02

## 2024-04-02 ENCOUNTER — OFFICE VISIT (OUTPATIENT)
Dept: SURGERY | Facility: CLINIC | Age: 54
End: 2024-04-02
Payer: COMMERCIAL

## 2024-04-02 VITALS
HEIGHT: 64 IN | OXYGEN SATURATION: 99 % | HEART RATE: 85 BPM | SYSTOLIC BLOOD PRESSURE: 126 MMHG | WEIGHT: 137.4 LBS | DIASTOLIC BLOOD PRESSURE: 79 MMHG | BODY MASS INDEX: 23.46 KG/M2

## 2024-04-02 DIAGNOSIS — K64.8 HEMORRHOID PROLAPSE: Primary | ICD-10-CM

## 2024-04-02 PROCEDURE — 46600 DIAGNOSTIC ANOSCOPY SPX: CPT | Performed by: NURSE PRACTITIONER

## 2024-04-02 PROCEDURE — 99203 OFFICE O/P NEW LOW 30 MIN: CPT | Mod: 25 | Performed by: NURSE PRACTITIONER

## 2024-04-02 ASSESSMENT — PAIN SCALES - GENERAL: PAINLEVEL: NO PAIN (0)

## 2024-04-02 NOTE — PROGRESS NOTES
"Colon and Rectal Surgery Consult Clinic Note    Date: 2024     Referring provider:  Referred Self, MD  No address on file     RE: Martin Salvador  : 1970  AYAN: 2024    Martin Salvador is a very pleasant 54 year old male here for hemorrhoids.    HPI:  Has hemorrhoids that have to be manually reduced after a bowel movement. No pain. No bleeding. Is using a hemorrhoid cream and that has helped but they still come out. Bowel movements are normal and soft. Not on any bowel medications. No anorectal surgeries in the past. Not on any blood thinners.  Colonoscopy scheduled for 24.     Physical Examination: Exam was chaperoned by Kayla Salcedo MA   /79 (BP Location: Left arm, Patient Position: Sitting, Cuff Size: Adult Regular)   Pulse 85   Ht 5' 4\"   Wt 137 lb 6.4 oz   SpO2 99%   BMI 23.58 kg/m    General: alert, oriented, in no acute distress, sitting comfortably  HEENT: mucous membranes moist    Perianal external examination:  Perianal skin: Intact with no excoriation or lichenification.  Lesions: No evidence of an external lesion, nodularity, or induration in the perianal region.  Eversion of buttocks: There was not evidence of an anal fissure. Details: N/A.  Skin tags or external hemorrhoids: None.    Digital rectal examination: Was performed.   Sphincter tone: Good.  Palpable lesions: No.  Prostate: Normal.  Other: None.    Anoscopy: Was performed.   Hemorrhoids: Yes. Grade 3 internal hemorrhoids without bleeding.  Lesions: No    Assessment/Plan: 54 year old male with hemorrhoid prolapse. We discussed serial banding versus surgical hemorrhoidectomy as these are significantly large. He is inclined to try banding first as he really wants to avoid surgery and symptoms are fairly manageable. Will have him start a daily fiber supplement and return in one month to reevaluate with possible banding as he has a colonoscopy schedule for next week. Patient's questions were answered to his stated " satisfaction and he is in agreement with this plan.     Medical history:  Past Medical History:   Diagnosis Date    External hemorrhoids     Hemorrhoids, internal     Paraseptal emphysema (H) 1/26/2016    Tobacco dependence        Surgical history:  Past Surgical History:   Procedure Laterality Date    LIGATION OF HEMORRHOID(S)         Problem list:    Patient Active Problem List    Diagnosis Date Noted    Ptosis of both eyelids 03/13/2024     Priority: Medium    Dermatochalasis of both upper eyelids 03/13/2024     Priority: Medium    Centrilobular emphysema (H) 01/29/2024     Priority: Medium    Diabetes mellitus, type 2 (H) 12/23/2020     Priority: Medium    Hyperlipidemia LDL goal <100 07/25/2018     Priority: Medium    Personal history of tobacco use, presenting hazards to health 01/06/2017     Priority: Medium    Gastroesophageal reflux disease without esophagitis 01/06/2017     Priority: Medium    Bladder calculi 01/27/2016     Priority: Medium    Impaired fasting glucose 01/27/2016     Priority: Medium    BPH (benign prostatic hyperplasia) 01/27/2016     Priority: Medium    Pulmonary nodules 01/26/2016     Priority: Medium    Internal hemorrhoid 07/11/2014     Priority: Medium    External hemorrhoids 03/31/2014     Priority: Medium     Problem list name updated by automated process. Provider to review         Medications:  Current Outpatient Medications   Medication Sig Dispense Refill    aspirin (ASA) 81 MG EC tablet Take 1 tablet (81 mg) by mouth daily 90 tablet 3    hydrocortisone, Perianal, (HYDROCORTISONE) 2.5 % cream Place rectally 2 times daily as needed for hemorrhoids 28 g 3    metFORMIN (GLUCOPHAGE) 500 MG tablet Take 1 tablet (500 mg) by mouth 2 times daily (with meals) 180 tablet 3    tamsulosin (FLOMAX) 0.4 MG capsule Take 1 capsule (0.4 mg) by mouth daily 90 capsule 3    atorvastatin (LIPITOR) 20 MG tablet Take 1 tablet (20 mg) by mouth daily (Patient not taking: Reported on 4/2/2024) 90 tablet  "3    bisacodyl (DULCOLAX) 5 MG EC tablet Take 2 tablets at 3 pm the day before your procedure. If your procedure is before 11 am, take 2 additional tablets at 11 pm. If your procedure is after 11 am, take 2 additional tablets at 6 am. For additional instructions refer to your colonoscopy prep instructions. (Patient not taking: Reported on 2024) 4 tablet 0    polyethylene glycol (GOLYTELY) 236 g suspension The night before the exam at 6 pm drink an 8-ounce glass every 15 minutes until the jug is half empty. If you arrive before 11 AM: Drink the other half of the Golytely jug at 11 PM night before procedure. If you arrive after 11 AM: Drink the other half of the Golytely jug at 6 AM day of procedure. For additional instructions refer to your colonoscopy prep instructions. (Patient not taking: Reported on 2024) 4000 mL 0       Allergies:  No Known Allergies    Family history:  Family History   Problem Relation Age of Onset    Diabetes Mother        Social history:  Social History     Tobacco Use    Smoking status: Former     Packs/day: 1     Types: Cigarettes     Start date: 1984     Quit date: 2016     Years since quittin.2    Smokeless tobacco: Never   Substance Use Topics    Alcohol use: Yes    Marital status: .  Nursing Notes:   Kayla Salcedo  2024  8:48 AM  Signed  Chief Complaint   Patient presents with    Hemorrhoids       Vitals:    24 0846   BP: 126/79   BP Location: Left arm   Patient Position: Sitting   Cuff Size: Adult Regular   Pulse: 85   SpO2: 99%   Weight: 137 lb 6.4 oz   Height: 5' 4\"       Body mass index is 23.58 kg/m .    Kayla Salcedo CMA       20 minutes spent on the date of encounter performing chart review, history and exam, documentation and further activities as noted above with an additional 2 minutes for anoscopy.     TONI Posada, NP-C  Colon and Rectal Surgery   [facility]    This note was created " using speech recognition software and may contain unintended word substitutions.

## 2024-04-02 NOTE — LETTER
"2024       RE: Martin Salvador  [address]  [place] [address]     Dear Colleague,    Thank you for referring your patient, Martin Salvador, to the [facility] COLON AND RECTAL SURGERY [facility]INIC [place] [facility]. Please see a copy of my visit note below.    Colon and Rectal Surgery Consult Clinic Note    Date: 2024     Referring provider:  Referred Self, MD  No address on file     RE: Martin Salvador  : 1970  AYAN: 2024    Martin Salvador is a very pleasant 54 year old male here for hemorrhoids.    HPI:  Has hemorrhoids that have to be manually reduced after a bowel movement. No pain. No bleeding. Is using a hemorrhoid cream and that has helped but they still come out. Bowel movements are normal and soft. Not on any bowel medications. No anorectal surgeries in the past. Not on any blood thinners.  Colonoscopy scheduled for 24.     Physical Examination: Exam was chaperoned by Kayla Salcedo MA   /79 (BP Location: Left arm, Patient Position: Sitting, Cuff Size: Adult Regular)   Pulse 85   Ht 5' 4\"   Wt 137 lb 6.4 oz   SpO2 99%   BMI 23.58 kg/m    General: alert, oriented, in no acute distress, sitting comfortably  HEENT: mucous membranes moist    Perianal external examination:  Perianal skin: Intact with no excoriation or lichenification.  Lesions: No evidence of an external lesion, nodularity, or induration in the perianal region.  Eversion of buttocks: There was not evidence of an anal fissure. Details: N/A.  Skin tags or external hemorrhoids: None.    Digital rectal examination: Was performed.   Sphincter tone: Good.  Palpable lesions: No.  Prostate: Normal.  Other: None.    Anoscopy: Was performed.   Hemorrhoids: Yes. Grade 3 internal hemorrhoids without bleeding.  Lesions: No    Assessment/Plan: 54 year old male with hemorrhoid prolapse. We discussed serial banding versus surgical hemorrhoidectomy as these are " significantly large. He is inclined to try banding first as he really wants to avoid surgery and symptoms are fairly manageable. Will have him start a daily fiber supplement and return in one month to reevaluate with possible banding as he has a colonoscopy schedule for next week. Patient's questions were answered to his stated satisfaction and he is in agreement with this plan.     Medical history:  Past Medical History:   Diagnosis Date    External hemorrhoids     Hemorrhoids, internal     Paraseptal emphysema (H) 1/26/2016    Tobacco dependence        Surgical history:  Past Surgical History:   Procedure Laterality Date    LIGATION OF HEMORRHOID(S)         Problem list:    Patient Active Problem List    Diagnosis Date Noted    Ptosis of both eyelids 03/13/2024     Priority: Medium    Dermatochalasis of both upper eyelids 03/13/2024     Priority: Medium    Centrilobular emphysema (H) 01/29/2024     Priority: Medium    Diabetes mellitus, type 2 (H) 12/23/2020     Priority: Medium    Hyperlipidemia LDL goal <100 07/25/2018     Priority: Medium    Personal history of tobacco use, presenting hazards to health 01/06/2017     Priority: Medium    Gastroesophageal reflux disease without esophagitis 01/06/2017     Priority: Medium    Bladder calculi 01/27/2016     Priority: Medium    Impaired fasting glucose 01/27/2016     Priority: Medium    BPH (benign prostatic hyperplasia) 01/27/2016     Priority: Medium    Pulmonary nodules 01/26/2016     Priority: Medium    Internal hemorrhoid 07/11/2014     Priority: Medium    External hemorrhoids 03/31/2014     Priority: Medium     Problem list name updated by automated process. Provider to review         Medications:  Current Outpatient Medications   Medication Sig Dispense Refill    aspirin (ASA) 81 MG EC tablet Take 1 tablet (81 mg) by mouth daily 90 tablet 3    hydrocortisone, Perianal, (HYDROCORTISONE) 2.5 % cream Place rectally 2 times daily as needed for hemorrhoids 28 g 3  "   metFORMIN (GLUCOPHAGE) 500 MG tablet Take 1 tablet (500 mg) by mouth 2 times daily (with meals) 180 tablet 3    tamsulosin (FLOMAX) 0.4 MG capsule Take 1 capsule (0.4 mg) by mouth daily 90 capsule 3    atorvastatin (LIPITOR) 20 MG tablet Take 1 tablet (20 mg) by mouth daily (Patient not taking: Reported on 2024) 90 tablet 3    bisacodyl (DULCOLAX) 5 MG EC tablet Take 2 tablets at 3 pm the day before your procedure. If your procedure is before 11 am, take 2 additional tablets at 11 pm. If your procedure is after 11 am, take 2 additional tablets at 6 am. For additional instructions refer to your colonoscopy prep instructions. (Patient not taking: Reported on 2024) 4 tablet 0    polyethylene glycol (GOLYTELY) 236 g suspension The night before the exam at 6 pm drink an 8-ounce glass every 15 minutes until the jug is half empty. If you arrive before 11 AM: Drink the other half of the Golytely jug at 11 PM night before procedure. If you arrive after 11 AM: Drink the other half of the Golytely jug at 6 AM day of procedure. For additional instructions refer to your colonoscopy prep instructions. (Patient not taking: Reported on 2024) 4000 mL 0       Allergies:  No Known Allergies    Family history:  Family History   Problem Relation Age of Onset    Diabetes Mother        Social history:  Social History     Tobacco Use    Smoking status: Former     Packs/day: 1     Types: Cigarettes     Start date: 1984     Quit date: 2016     Years since quittin.2    Smokeless tobacco: Never   Substance Use Topics    Alcohol use: Yes    Marital status: .  Nursing Notes:   Kayla Salcedo  2024  8:48 AM  Signed  Chief Complaint   Patient presents with    Hemorrhoids       Vitals:    24 0846   BP: 126/79   BP Location: Left arm   Patient Position: Sitting   Cuff Size: Adult Regular   Pulse: 85   SpO2: 99%   Weight: 137 lb 6.4 oz   Height: 5' 4\"       Body mass index is 23.58 kg/m Reed Garza " JOSÉ MIGUEL Salcedo       20 minutes spent on the date of encounter performing chart review, history and exam, documentation and further activities as noted above with an additional 2 minutes for anoscopy.       This note was created using speech recognition software and may contain unintended word substitutions.        Again, thank you for allowing me to participate in the care of your patient.      Sincerely,    TONI Portillo CNP

## 2024-04-02 NOTE — PATIENT INSTRUCTIONS
Start a daily fiber supplement such as Citrucel or Metamucil. Start with once a day and slowly increase up to three times a day, if needed, over the next 4-6 weeks    Colonoscopy on 4/11/2024    Follow up after 1 month of fiber

## 2024-04-02 NOTE — NURSING NOTE
"Chief Complaint   Patient presents with    Hemorrhoids       Vitals:    04/02/24 0846   BP: 126/79   BP Location: Left arm   Patient Position: Sitting   Cuff Size: Adult Regular   Pulse: 85   SpO2: 99%   Weight: 137 lb 6.4 oz   Height: 5' 4\"       Body mass index is 23.58 kg/m .    Kayla Salcedo CMA    "

## 2024-04-04 ENCOUNTER — TELEPHONE (OUTPATIENT)
Dept: SURGERY | Facility: CLINIC | Age: 54
End: 2024-04-04
Payer: COMMERCIAL

## 2024-04-04 NOTE — TELEPHONE ENCOUNTER
[facility] Call Center    Phone Message    May a detailed message be left on voicemail: yes     Reason for Call: Other: Patient saw Gosia on 4/2/24 and she mentioned a medication.  Patient went to pharmacy and there is nothing to .  Was he to buy the medication?  Please follow up with patient - he uses [facility] #[id number].     Action Taken: Message routed to:  [facility] ([facility]    Travel Screening: Not Applicable

## 2024-04-11 ENCOUNTER — HOSPITAL ENCOUNTER (OUTPATIENT)
Facility: AMBULATORY SURGERY CENTER | Age: 54
Discharge: HOME | End: 2024-04-11
Attending: INTERNAL MEDICINE | Admitting: INTERNAL MEDICINE
Payer: COMMERCIAL

## 2024-04-11 VITALS
BODY MASS INDEX: 23.52 KG/M2 | WEIGHT: 137 LBS | SYSTOLIC BLOOD PRESSURE: 116 MMHG | TEMPERATURE: 97.5 F | RESPIRATION RATE: 16 BRPM | DIASTOLIC BLOOD PRESSURE: 85 MMHG | HEART RATE: 74 BPM | OXYGEN SATURATION: 97 %

## 2024-04-11 LAB
COLONOSCOPY: NORMAL
GLUCOSE BLDC GLUCOMTR-MCNC: 93 MG/DL (ref 70–99)
GLUCOSE BLDC GLUCOMTR-MCNC: 94 MG/DL (ref 70–99)

## 2024-04-11 PROCEDURE — 45380 COLONOSCOPY AND BIOPSY: CPT | Mod: PT

## 2024-04-11 PROCEDURE — G8918 PT W/O PREOP ORDER IV AB PRO: HCPCS

## 2024-04-11 PROCEDURE — 88305 TISSUE EXAM BY PATHOLOGIST: CPT | Performed by: PATHOLOGY

## 2024-04-11 PROCEDURE — G8907 PT DOC NO EVENTS ON DISCHARG: HCPCS

## 2024-04-11 RX ORDER — ONDANSETRON 2 MG/ML
4 INJECTION INTRAMUSCULAR; INTRAVENOUS EVERY 6 HOURS PRN
Status: DISCONTINUED | OUTPATIENT
Start: 2024-04-11 | End: 2024-04-12 | Stop reason: HOSPADM

## 2024-04-11 RX ORDER — ONDANSETRON 4 MG/1
4 TABLET, ORALLY DISINTEGRATING ORAL EVERY 6 HOURS PRN
Status: DISCONTINUED | OUTPATIENT
Start: 2024-04-11 | End: 2024-04-12 | Stop reason: HOSPADM

## 2024-04-11 RX ORDER — LIDOCAINE 40 MG/G
CREAM TOPICAL
Status: DISCONTINUED | OUTPATIENT
Start: 2024-04-11 | End: 2024-04-12 | Stop reason: HOSPADM

## 2024-04-11 RX ORDER — NALOXONE HYDROCHLORIDE 0.4 MG/ML
0.4 INJECTION, SOLUTION INTRAMUSCULAR; INTRAVENOUS; SUBCUTANEOUS
Status: DISCONTINUED | OUTPATIENT
Start: 2024-04-11 | End: 2024-04-12 | Stop reason: HOSPADM

## 2024-04-11 RX ORDER — PROCHLORPERAZINE MALEATE 10 MG
10 TABLET ORAL EVERY 6 HOURS PRN
Status: DISCONTINUED | OUTPATIENT
Start: 2024-04-11 | End: 2024-04-12 | Stop reason: HOSPADM

## 2024-04-11 RX ORDER — FLUMAZENIL 0.1 MG/ML
0.2 INJECTION, SOLUTION INTRAVENOUS
Status: ACTIVE | OUTPATIENT
Start: 2024-04-11 | End: 2024-04-11

## 2024-04-11 RX ORDER — NALOXONE HYDROCHLORIDE 0.4 MG/ML
0.2 INJECTION, SOLUTION INTRAMUSCULAR; INTRAVENOUS; SUBCUTANEOUS
Status: DISCONTINUED | OUTPATIENT
Start: 2024-04-11 | End: 2024-04-12 | Stop reason: HOSPADM

## 2024-04-11 RX ORDER — ONDANSETRON 2 MG/ML
4 INJECTION INTRAMUSCULAR; INTRAVENOUS
Status: DISCONTINUED | OUTPATIENT
Start: 2024-04-11 | End: 2024-04-12 | Stop reason: HOSPADM

## 2024-04-11 RX ORDER — FENTANYL CITRATE 50 UG/ML
INJECTION, SOLUTION INTRAMUSCULAR; INTRAVENOUS PRN
Status: DISCONTINUED | OUTPATIENT
Start: 2024-04-11 | End: 2024-04-11 | Stop reason: HOSPADM

## 2024-04-11 NOTE — H&P
[facility] Anesthesia Pre-op History and Physical    Martin Salvador MRN# [id number]   Age: 54 year old YOB: 1970     Date of Exam 4/11/2024       Primary care provider: No Ref-Primary, Physician         Chief Complaint and/or Reason for Procedure:     CRC screening         Active problem list:     Patient Active Problem List    Diagnosis Date Noted    Ptosis of both eyelids 03/13/2024     Priority: Medium    Dermatochalasis of both upper eyelids 03/13/2024     Priority: Medium    Centrilobular emphysema (H) 01/29/2024     Priority: Medium    Diabetes mellitus, type 2 (H) 12/23/2020     Priority: Medium    Hyperlipidemia LDL goal <100 07/25/2018     Priority: Medium    Personal history of tobacco use, presenting hazards to health 01/06/2017     Priority: Medium    Gastroesophageal reflux disease without esophagitis 01/06/2017     Priority: Medium    Bladder calculi 01/27/2016     Priority: Medium    Impaired fasting glucose 01/27/2016     Priority: Medium    BPH (benign prostatic hyperplasia) 01/27/2016     Priority: Medium    Pulmonary nodules 01/26/2016     Priority: Medium    Internal hemorrhoid 07/11/2014     Priority: Medium    External hemorrhoids 03/31/2014     Priority: Medium     Problem list name updated by automated process. Provider to review              Medications (include herbals and vitamins):   Any Plavix use in the last 7 days? No     Current Outpatient Medications   Medication Sig Dispense Refill    aspirin (ASA) 81 MG EC tablet Take 1 tablet (81 mg) by mouth daily 90 tablet 3    atorvastatin (LIPITOR) 20 MG tablet Take 1 tablet (20 mg) by mouth daily 90 tablet 3    hydrocortisone, Perianal, (HYDROCORTISONE) 2.5 % cream Place rectally 2 times daily as needed for hemorrhoids 28 g 3    metFORMIN (GLUCOPHAGE) 500 MG tablet Take 1 tablet (500 mg) by mouth 2 times daily (with meals) 180 tablet 3    tamsulosin (FLOMAX) 0.4 MG capsule Take 1 capsule (0.4 mg) by mouth daily 90 capsule  3    bisacodyl (DULCOLAX) 5 MG EC tablet Take 2 tablets at 3 pm the day before your procedure. If your procedure is before 11 am, take 2 additional tablets at 11 pm. If your procedure is after 11 am, take 2 additional tablets at 6 am. For additional instructions refer to your colonoscopy prep instructions. (Patient not taking: Reported on 4/2/2024) 4 tablet 0    polyethylene glycol (GOLYTELY) 236 g suspension The night before the exam at 6 pm drink an 8-ounce glass every 15 minutes until the jug is half empty. If you arrive before 11 AM: Drink the other half of the Golytely jug at 11 PM night before procedure. If you arrive after 11 AM: Drink the other half of the Golytely jug at 6 AM day of procedure. For additional instructions refer to your colonoscopy prep instructions. (Patient not taking: Reported on 4/2/2024) 4000 mL 0     Current Facility-Administered Medications   Medication Dose Route Frequency Provider Last Rate Last Admin    lidocaine (LMX4) kit   Topical Q1H PRN McBeath, Leidy, DO        lidocaine 1 % 0.1-1 mL  0.1-1 mL Other Q1H PRN McBeath, Leidy, DO        ondansetron (ZOFRAN) injection 4 mg  4 mg Intravenous Once PRN McBeath, Leidy, DO        sodium chloride (PF) 0.9% PF flush 3 mL  3 mL Intracatheter Q8H McBeath, Leidy, DO        sodium chloride (PF) 0.9% PF flush 3 mL  3 mL Intracatheter q1 min prn McBeath, Leidy, DO                 Allergies:    No Known Allergies  Allergy to Latex? No  Allergy to tape?   No  Intolerances:             Physical Exam:   All vitals have been reviewed  Patient Vitals for the past 8 hrs:   BP Temp Temp src Pulse Resp SpO2 Weight   04/11/24 0910 116/74 97.5  F (36.4  C) Temporal 72 16 100 % 62.1 kg (137 lb)     No intake/output data recorded.  Lungs:   No increased work of breathing, good air exchange, clear to auscultation bilaterally, no crackles or wheezing     Cardiovascular:   normal S1 and S2             Lab / Radiology Results:            Anesthetic  risk and/or ASA classification:   2    Leidy Multani, DO

## 2024-04-15 ENCOUNTER — OFFICE VISIT (OUTPATIENT)
Dept: FAMILY MEDICINE | Facility: CLINIC | Age: 54
End: 2024-04-15
Payer: COMMERCIAL

## 2024-04-15 VITALS
SYSTOLIC BLOOD PRESSURE: 125 MMHG | HEART RATE: 80 BPM | WEIGHT: 135.2 LBS | DIASTOLIC BLOOD PRESSURE: 77 MMHG | BODY MASS INDEX: 23.08 KG/M2 | TEMPERATURE: 97.8 F | HEIGHT: 64 IN | OXYGEN SATURATION: 99 % | RESPIRATION RATE: 18 BRPM

## 2024-04-15 DIAGNOSIS — Z01.818 PREOP GENERAL PHYSICAL EXAM: Primary | ICD-10-CM

## 2024-04-15 DIAGNOSIS — H02.831 DERMATOCHALASIS OF BOTH UPPER EYELIDS: ICD-10-CM

## 2024-04-15 DIAGNOSIS — N40.1 BENIGN PROSTATIC HYPERPLASIA WITH URINARY FREQUENCY: ICD-10-CM

## 2024-04-15 DIAGNOSIS — H02.403 PTOSIS OF BOTH EYELIDS: ICD-10-CM

## 2024-04-15 DIAGNOSIS — H02.834 DERMATOCHALASIS OF BOTH UPPER EYELIDS: ICD-10-CM

## 2024-04-15 DIAGNOSIS — R35.0 BENIGN PROSTATIC HYPERPLASIA WITH URINARY FREQUENCY: ICD-10-CM

## 2024-04-15 DIAGNOSIS — E78.5 HYPERLIPIDEMIA LDL GOAL <100: ICD-10-CM

## 2024-04-15 DIAGNOSIS — J43.2 CENTRILOBULAR EMPHYSEMA (H): ICD-10-CM

## 2024-04-15 DIAGNOSIS — E11.9 TYPE 2 DIABETES MELLITUS WITHOUT COMPLICATION, WITHOUT LONG-TERM CURRENT USE OF INSULIN (H): ICD-10-CM

## 2024-04-15 LAB
HBA1C MFR BLD: 6.5 % (ref 0–5.6)
PATH REPORT.COMMENTS IMP SPEC: NORMAL
PATH REPORT.COMMENTS IMP SPEC: NORMAL
PATH REPORT.FINAL DX SPEC: NORMAL
PATH REPORT.GROSS SPEC: NORMAL
PATH REPORT.MICROSCOPIC SPEC OTHER STN: NORMAL
PATH REPORT.RELEVANT HX SPEC: NORMAL
PHOTO IMAGE: NORMAL

## 2024-04-15 PROCEDURE — G0103 PSA SCREENING: HCPCS

## 2024-04-15 PROCEDURE — 99214 OFFICE O/P EST MOD 30 MIN: CPT

## 2024-04-15 PROCEDURE — 36415 COLL VENOUS BLD VENIPUNCTURE: CPT

## 2024-04-15 PROCEDURE — 83036 HEMOGLOBIN GLYCOSYLATED A1C: CPT

## 2024-04-15 ASSESSMENT — PAIN SCALES - GENERAL: PAINLEVEL: NO PAIN (0)

## 2024-04-15 NOTE — PROGRESS NOTES
Preoperative Evaluation  [address] [address]  Phone: [phone]  Primary Provider: No Ref-Primary, Physician  Pre-op Performing Provider: DWIGHT BAZAN  Apr 15, 2024       Martin is a 54 year old, presenting for the following:  Pre-Op Exam        4/15/2024     8:56 AM   Additional Questions   Roomed by DM     Surgical Information  Surgery/Procedure: Both upper eyelid blepharoplasty and ptosis repair  Surgery Location: [facility]  Surgeon: Savannah Henderson MD   Surgery Date: 5/13/24  Time of Surgery:   Where patient plans to recover: At home with family  Fax number for surgical facility: Note does not need to be faxed, will be available electronically in Epic.    Assessment & Plan     The proposed surgical procedure is considered LOW risk.    Preop general physical exam  - Reviewed risk factors and medical and family history.  - Preoperative instructions and medication hold times reviewed with patient.    Dermatochalasis of both upper eyelids  - Cleared for surgery as planned.    Ptosis of both eyelids  - Cleared for surgery as planned.    Type 2 diabetes mellitus without complication, without long-term current use of insulin (H)  - Stable on metformin. Due for A1C. Patient instructed to hold metformin the morning of surgery and voices understanding.  - HEMOGLOBIN A1C    Benign prostatic hyperplasia with urinary frequency  - Continue Flomax. Patient has been taking inconsistently. Restrict fluids before bed.  - PSA, screen    Hyperlipidemia LDL goal <100  - Stable. Tolerating atorvastatin without side effects.    Centrilobular emphysema (H)  - Stable. No cough, wheezing, or dyspnea.     Risks and Recommendations  The patient has the following additional risks and recommendations for perioperative complications:  Diabetes:  - Patient is not on insulin therapy: regular NPO guidelines can be followed.      Antiplatelet or Anticoagulation Medication Instructions   - aspirin: Discontinue aspirin 7-10 days prior to procedure to reduce bleeding risk. It should be resumed postoperatively.     Additional Medication Instructions   - Statins: Continue taking on the day of surgery.    - metformin: HOLD day of surgery.    Recommendation  APPROVAL GIVEN to proceed with proposed procedure, without further diagnostic evaluation.    Subjective     HPI related to upcoming procedure: gradual onset of droopy eyelids over the past years. The droopy eyelid is interfering with activities of daily living including driving, and reading. The patient denies double vision, variability of the eyelid position. Sometimes his eyes feel tired and dry.         4/15/2024     8:58 AM   Preop Questions   1. Have you ever had a heart attack or stroke? No   2. Have you ever had surgery on your heart or blood vessels, such as a stent placement, a coronary artery bypass, or surgery on an artery in your head, neck, heart, or legs? No   3. Do you have chest pain with activity? No   4. Do you have a history of  heart failure? No   5. Do you currently have a cold, bronchitis or symptoms of other infection? No   6. Do you have a cough, shortness of breath, or wheezing? No   7. Do you or anyone in your family have previous history of blood clots? No   8. Do you or does anyone in your family have a serious bleeding problem such as prolonged bleeding following surgeries or cuts? No   9. Have you ever had problems with anemia or been told to take iron pills? No   10. Have you had any abnormal blood loss such as black, tarry or bloody stools? No   11. Have you ever had a blood transfusion? No   12. Are you willing to have a blood transfusion if it is medically needed before, during, or after your surgery? Yes   13. Have you or any of your relatives ever had problems with anesthesia? No   14. Do you have sleep apnea, excessive snoring or daytime drowsiness? No    15. Do you have any artifical heart valves or other implanted medical devices like a pacemaker, defibrillator, or continuous glucose monitor? No   16. Do you have artificial joints? No   17. Are you allergic to latex? No     Health Care Directive  Patient does not have a Health Care Directive or Living Will: Discussed advance care planning with patient; however, patient declined at this time.    Preoperative Review of    reviewed - no record of controlled substances prescribed.    Status of Chronic Conditions:  DIABETES - Patient has a longstanding history of DiabetesType Type II . Patient is being treated with oral agents and denies significant side effects. Control has been good. Complicating factors include but are not limited to: hyperlipidemia.     Patient Active Problem List    Diagnosis Date Noted    Ptosis of both eyelids 03/13/2024     Priority: Medium    Dermatochalasis of both upper eyelids 03/13/2024     Priority: Medium    Centrilobular emphysema (H) 01/29/2024     Priority: Medium    Diabetes mellitus, type 2 (H) 12/23/2020     Priority: Medium    Hyperlipidemia LDL goal <100 07/25/2018     Priority: Medium    Personal history of tobacco use, presenting hazards to health 01/06/2017     Priority: Medium    Gastroesophageal reflux disease without esophagitis 01/06/2017     Priority: Medium    Bladder calculi 01/27/2016     Priority: Medium    Impaired fasting glucose 01/27/2016     Priority: Medium    BPH (benign prostatic hyperplasia) 01/27/2016     Priority: Medium    Pulmonary nodules 01/26/2016     Priority: Medium    Internal hemorrhoid 07/11/2014     Priority: Medium    External hemorrhoids 03/31/2014     Priority: Medium     Problem list name updated by automated process. Provider to review        Past Medical History:   Diagnosis Date    External hemorrhoids     Hemorrhoids, internal     Paraseptal emphysema (H) 1/26/2016    Tobacco dependence      Past Surgical History:   Procedure  Laterality Date    COLONOSCOPY N/A 2024    Procedure: COLONOSCOPY, WITH POLYPECTOMY AND BIOPSY;  Surgeon: Leidy Multani DO;  Location: [facility] OR    COLONOSCOPY WITH CO2 INSUFFLATION N/A 2024    Procedure: Colonoscopy with CO2 insufflation;  Surgeon: Leidy Multani DO;  Location: [facility] OR    LIGATION OF HEMORRHOID(S)       Current Outpatient Medications   Medication Sig Dispense Refill    aspirin (ASA) 81 MG EC tablet Take 1 tablet (81 mg) by mouth daily 90 tablet 3    atorvastatin (LIPITOR) 20 MG tablet Take 1 tablet (20 mg) by mouth daily 90 tablet 3    metFORMIN (GLUCOPHAGE) 500 MG tablet Take 1 tablet (500 mg) by mouth 2 times daily (with meals) 180 tablet 3    tamsulosin (FLOMAX) 0.4 MG capsule Take 1 capsule (0.4 mg) by mouth daily 90 capsule 3    hydrocortisone, Perianal, (HYDROCORTISONE) 2.5 % cream Place rectally 2 times daily as needed for hemorrhoids 28 g 3     No Known Allergies     Social History     Tobacco Use    Smoking status: Former     Current packs/day: 0.00     Average packs/day: 1 pack/day for 32.0 years (32.0 ttl pk-yrs)     Types: Cigarettes     Start date: 1984     Quit date: 2016     Years since quittin.2    Smokeless tobacco: Never   Substance Use Topics    Alcohol use: Yes     History   Drug Use No         Review of Systems    Review of Systems  CONSTITUTIONAL: NEGATIVE for fever, chills, change in weight  INTEGUMENTARY/SKIN: NEGATIVE for worrisome rashes, moles or lesions  EYES: NEGATIVE for vision changes or irritation  ENT/MOUTH: NEGATIVE for ear, mouth and throat problems  RESP: NEGATIVE for significant cough or SOB  BREAST: NEGATIVE for masses, tenderness or discharge  CV: NEGATIVE for chest pain, palpitations or peripheral edema  GI: NEGATIVE for nausea, abdominal pain, heartburn, or change in bowel habits  : NEGATIVE for frequency, dysuria, or hematuria  MUSCULOSKELETAL: NEGATIVE for significant arthralgias or myalgia  NEURO: NEGATIVE for weakness, dizziness  "or paresthesias  ENDOCRINE: NEGATIVE for temperature intolerance, skin/hair changes  HEME: NEGATIVE for bleeding problems  PSYCHIATRIC: NEGATIVE for changes in mood or affect    Objective    /77   Pulse 80   Temp 97.8  F (36.6  C)   Resp 18   Ht 1.626 m (5' 4\")   Wt 61.3 kg (135 lb 3.2 oz)   SpO2 99%   BMI 23.21 kg/m     Estimated body mass index is 23.21 kg/m  as calculated from the following:    Height as of this encounter: 1.626 m (5' 4\").    Weight as of this encounter: 61.3 kg (135 lb 3.2 oz).    Physical Exam  GENERAL: alert and no distress  EYES: Eyes grossly normal to inspection, PERRL and conjunctivae and sclerae normal  HENT: ear canals and TM's normal, nose and mouth without ulcers or lesions  NECK: no adenopathy, no asymmetry, masses, or scars  RESP: lungs clear to auscultation - no rales, rhonchi or wheezes  CV: regular rate and rhythm, normal S1 S2, no S3 or S4, no murmur, click or rub, no peripheral edema  ABDOMEN: soft, nontender, no hepatosplenomegaly, no masses and bowel sounds normal  MS: no gross musculoskeletal defects noted, no edema  SKIN: no suspicious lesions or rashes  NEURO: Normal strength and tone, mentation intact and speech normal  PSYCH: mentation appears normal, affect normal/bright    Recent Labs   Lab Test 09/14/23  1327 03/17/23  1729 07/06/22  1007     --  138   POTASSIUM 4.4  --  5.0   CR 0.81  --  0.73   A1C 6.2* 6.0* 5.9*      Diagnostics  No labs were ordered during this visit.   No EKG required for low risk surgery (cataract, skin procedure, breast biopsy, etc).    Revised Cardiac Risk Index (RCRI)  The patient has the following serious cardiovascular risks for perioperative complications:   - No serious cardiac risks = 0 points     RCRI Interpretation: 0 points: Class I (very low risk - 0.4% complication rate)     Signed Electronically by: TONI Flowers CNP  Copy of this evaluation report is provided to requesting physician.         "

## 2024-04-15 NOTE — LETTER
April 17, 2024      Martin Salvador  [address]  [facility] [place] [address]        Dear ,    We are writing to inform you of your test results.    I have reviewed your results. Your A1C is stable and your diabetes is under control. Your prostate screening is in normal range.     Please let me know if you have any questions or concerns. Have a great day!     Resulted Orders   HEMOGLOBIN A1C   Result Value Ref Range    Hemoglobin A1C 6.5 (H) 0.0 - 5.6 %      Comment:      Normal <5.7%   Prediabetes 5.7-6.4%    Diabetes 6.5% or higher     Note: Adopted from ADA consensus guidelines.   PSA, screen   Result Value Ref Range    Prostate Specific Antigen Screen 1.43 0.00 - 3.50 ng/mL    Narrative    This result is obtained using the Roche Elecsys total PSA method on the padmaja e801 immunoassay analyzer. Results obtained with different assay methods or kits cannot be used interchangeably.       If you have any questions or concerns, please call the clinic at the number listed above.       Sincerely,      TONI Flowers CNP

## 2024-04-15 NOTE — PATIENT INSTRUCTIONS
Preparing for Your Surgery  Getting started  A nurse will call you to review your health history and instructions. They will give you an arrival time based on your scheduled surgery time. Please be ready to share:  Your doctor's clinic name and phone number  Your medical, surgical, and anesthesia history  A list of allergies and sensitivities  A list of medicines, including herbal treatments and over-the-counter drugs  Whether the patient has a legal guardian (ask how to send us the papers in advance)  Please tell us if you're pregnant--or if there's any chance you might be pregnant. Some surgeries may injure a fetus (unborn baby), so they require a pregnancy test. Surgeries that are safe for a fetus don't always need a test, and you can choose whether to have one.   Preparing for surgery  Within 10 to 30 days of surgery: Have a pre-op exam (sometimes called an H&P, or History and Physical). This can be done at a clinic or pre-operative center.  If you're having a , you may not need this exam. Talk to your care team.  At your pre-op exam, talk to your care team about all medicines you take. If you need to stop any medicines before surgery, ask when to start taking them again.  We do this for your safety. Many medicines can make you bleed too much during surgery. Some change how well surgery (anesthesia) drugs work.  Call your insurance company to let them know you're having surgery. (If you don't have insurance, call [phone].)  Call your clinic if there's any change in your health. This includes signs of a cold or flu (sore throat, runny nose, cough, rash, fever). It also includes a scrape or scratch near the surgery site.  If you have questions on the day of surgery, call your hospital or surgery center.  Eating and drinking guidelines  For your safety: Unless your surgeon tells you otherwise, follow the guidelines below.  Eat and drink as usual until 8 hours before you arrive for surgery. After that,  no food or milk.  Drink clear liquids until 2 hours before you arrive. These are liquids you can see through, like water, Gatorade, and Propel Water. They also include plain black coffee and tea (no cream or milk), candy, and breath mints. You can spit out gum when you arrive.  If you drink alcohol: Stop drinking it the night before surgery.  If your care team tells you to take medicine on the morning of surgery, it's okay to take it with a sip of water.  Preventing infection  Shower or bathe the night before and morning of your surgery. Follow the instructions your clinic gave you. (If no instructions, use regular soap.)  Don't shave or clip hair near your surgery site. We'll remove the hair if needed.  Don't smoke or vape the morning of surgery. You may chew nicotine gum up to 2 hours before surgery. A nicotine patch is okay.  Note: Some surgeries require you to completely quit smoking and nicotine. Check with your surgeon.  Your care team will make every effort to keep you safe from infection. We will:  Clean our hands often with soap and water (or an alcohol-based hand rub).  Clean the skin at your surgery site with a special soap that kills germs.  Give you a special gown to keep you warm. (Cold raises the risk of infection.)  Wear special hair covers, masks, gowns and gloves during surgery.  Give antibiotic medicine, if prescribed. Not all surgeries need antibiotics.  What to bring on the day of surgery  Photo ID and insurance card  Copy of your health care directive, if you have one  Glasses and hearing aids (bring cases)  You can't wear contacts during surgery  Inhaler and eye drops, if you use them (tell us about these when you arrive)  CPAP machine or breathing device, if you use them  A few personal items, if spending the night  If you have . . .  A pacemaker, ICD (cardiac defibrillator) or other implant: Bring the ID card.  An implanted stimulator: Bring the remote control.  A legal guardian: Bring a  copy of the certified (court-stamped) guardianship papers.  Please remove any jewelry, including body piercings. Leave jewelry and other valuables at home.  If you're going home the day of surgery  You must have a responsible adult drive you home. They should stay with you overnight as well.  If you don't have someone to stay with you, and you aren't safe to go home alone, we may keep you overnight. Insurance often won't pay for this.  After surgery  If it's hard to control your pain or you need more pain medicine, please call your surgeon's office.  Questions?   If you have any questions for your care team, list them here: _________________________________________________________________________________________________________________________________________________________________________ ____________________________________ ____________________________________ ____________________________________  For informational purposes only. Not to replace the advice of your health care provider. Copyright   2003, 2019 [employer]. All rights reserved. Clinically reviewed by Nina Romero MD. [employer] [id number] - REV 12/22.    How to Take Your Medication Before Surgery  - HOLD (do not take) your METFORMIN on the morning of surgery.  - HOLD (do not take) Aspirin for 7 days.

## 2024-04-16 DIAGNOSIS — K64.4 EXTERNAL HEMORRHOIDS: ICD-10-CM

## 2024-04-16 LAB — PSA SERPL DL<=0.01 NG/ML-MCNC: 1.43 NG/ML (ref 0–3.5)

## 2024-04-16 RX ORDER — HYDROCORTISONE 25 MG/G
CREAM TOPICAL
Qty: 30 G | Refills: 0 | Status: SHIPPED | OUTPATIENT
Start: 2024-04-16

## 2024-04-30 ENCOUNTER — PATIENT OUTREACH (OUTPATIENT)
Dept: CARE COORDINATION | Facility: CLINIC | Age: 54
End: 2024-04-30
Payer: COMMERCIAL

## 2024-04-30 NOTE — PROGRESS NOTES
Clinic Care Coordination Contact  Program:   [facility]: [place]   Renewal: [employer]   Date Applied:      LEONARDO Outreach:   4/30/24: CTA called to see if patient needed assistance with their [employer] Renewal. Patient declined needing assistance and no follow up needed   Brigette Rosario  Care   [facility]  Clinic Care Coordination  [phone]      Health Insurance:        Referral/Screening:

## 2024-05-10 RX ORDER — ONDANSETRON 2 MG/ML
4 INJECTION INTRAMUSCULAR; INTRAVENOUS EVERY 30 MIN PRN
Status: CANCELLED | OUTPATIENT
Start: 2024-05-10

## 2024-05-10 RX ORDER — DEXAMETHASONE SODIUM PHOSPHATE 4 MG/ML
4 INJECTION, SOLUTION INTRA-ARTICULAR; INTRALESIONAL; INTRAMUSCULAR; INTRAVENOUS; SOFT TISSUE
Status: CANCELLED | OUTPATIENT
Start: 2024-05-10

## 2024-05-10 RX ORDER — FENTANYL CITRATE 50 UG/ML
25 INJECTION, SOLUTION INTRAMUSCULAR; INTRAVENOUS EVERY 5 MIN PRN
Status: CANCELLED | OUTPATIENT
Start: 2024-05-10

## 2024-05-10 RX ORDER — SODIUM CHLORIDE, SODIUM LACTATE, POTASSIUM CHLORIDE, CALCIUM CHLORIDE 600; 310; 30; 20 MG/100ML; MG/100ML; MG/100ML; MG/100ML
INJECTION, SOLUTION INTRAVENOUS CONTINUOUS
Status: CANCELLED | OUTPATIENT
Start: 2024-05-10

## 2024-05-10 RX ORDER — FENTANYL CITRATE 50 UG/ML
50 INJECTION, SOLUTION INTRAMUSCULAR; INTRAVENOUS EVERY 5 MIN PRN
Status: CANCELLED | OUTPATIENT
Start: 2024-05-10

## 2024-05-10 RX ORDER — ONDANSETRON 4 MG/1
4 TABLET, ORALLY DISINTEGRATING ORAL EVERY 30 MIN PRN
Status: CANCELLED | OUTPATIENT
Start: 2024-05-10

## 2024-05-10 RX ORDER — OXYCODONE HYDROCHLORIDE 5 MG/1
5 TABLET ORAL
Status: CANCELLED | OUTPATIENT
Start: 2024-05-10

## 2024-05-10 RX ORDER — NALOXONE HYDROCHLORIDE 0.4 MG/ML
0.1 INJECTION, SOLUTION INTRAMUSCULAR; INTRAVENOUS; SUBCUTANEOUS
Status: CANCELLED | OUTPATIENT
Start: 2024-05-10

## 2024-05-10 RX ORDER — OXYCODONE HYDROCHLORIDE 5 MG/1
10 TABLET ORAL
Status: CANCELLED | OUTPATIENT
Start: 2024-05-10

## 2024-05-13 ENCOUNTER — HOSPITAL ENCOUNTER (OUTPATIENT)
Facility: AMBULATORY SURGERY CENTER | Age: 54
Discharge: HOME | End: 2024-05-13
Attending: OPHTHALMOLOGY | Admitting: OPHTHALMOLOGY
Payer: COMMERCIAL

## 2024-05-13 ENCOUNTER — TELEPHONE (OUTPATIENT)
Dept: OPHTHALMOLOGY | Facility: CLINIC | Age: 54
End: 2024-05-13
Payer: COMMERCIAL

## 2024-05-13 VITALS
SYSTOLIC BLOOD PRESSURE: 126 MMHG | HEART RATE: 76 BPM | TEMPERATURE: 97.3 F | BODY MASS INDEX: 23.21 KG/M2 | DIASTOLIC BLOOD PRESSURE: 73 MMHG | WEIGHT: 135.2 LBS | RESPIRATION RATE: 16 BRPM | OXYGEN SATURATION: 98 %

## 2024-05-13 DIAGNOSIS — H02.834 DERMATOCHALASIS OF BOTH UPPER EYELIDS: Primary | ICD-10-CM

## 2024-05-13 DIAGNOSIS — H02.831 DERMATOCHALASIS OF BOTH UPPER EYELIDS: Primary | ICD-10-CM

## 2024-05-13 LAB
GLUCOSE BLDC GLUCOMTR-MCNC: 111 MG/DL (ref 70–99)
GLUCOSE BLDC GLUCOMTR-MCNC: 124 MG/DL (ref 70–99)

## 2024-05-13 RX ORDER — ACETAMINOPHEN 325 MG/1
975 TABLET ORAL ONCE
Status: COMPLETED | OUTPATIENT
Start: 2024-05-13 | End: 2024-05-13

## 2024-05-13 RX ORDER — LIDOCAINE 40 MG/G
CREAM TOPICAL
Status: DISCONTINUED | OUTPATIENT
Start: 2024-05-13 | End: 2024-08-14 | Stop reason: HOSPADM

## 2024-05-13 RX ORDER — SODIUM CHLORIDE, SODIUM LACTATE, POTASSIUM CHLORIDE, CALCIUM CHLORIDE 600; 310; 30; 20 MG/100ML; MG/100ML; MG/100ML; MG/100ML
INJECTION, SOLUTION INTRAVENOUS CONTINUOUS
Status: DISCONTINUED | OUTPATIENT
Start: 2024-05-13 | End: 2024-08-14 | Stop reason: HOSPADM

## 2024-05-13 RX ORDER — ERYTHROMYCIN 5 MG/G
OINTMENT OPHTHALMIC
Qty: 3.5 G | Refills: 0 | Status: SHIPPED | OUTPATIENT
Start: 2024-05-13 | End: 2024-08-07

## 2024-05-13 RX ADMIN — SODIUM CHLORIDE, SODIUM LACTATE, POTASSIUM CHLORIDE, CALCIUM CHLORIDE: 600; 310; 30; 20 INJECTION, SOLUTION INTRAVENOUS at 08:25

## 2024-05-13 RX ADMIN — ACETAMINOPHEN 975 MG: 325 TABLET ORAL at 08:14

## 2024-05-13 NOTE — DISCHARGE INSTRUCTIONS
Post-operative Instructions  Ophthalmic Plastic and Reconstructive Surgery    Savannah Henderson M.D.     All instructions apply to the operated eye(s) or eyelid(s).    Wound care and personal care  ? Apply ice compresses and gentle pressure 15 minutes on, 15 minutes off, for 2 days. If you are sleeping, you don't need to wake up to ice. As long as there is no further bleeding, after two days, switch to warm water compresses for five minutes, four times a day until seen by your physician.   ? You may shower or wash your hair the day after surgery. Do not go swimming for at least 2 weeks to prevent contamination of your wounds.  ? You may go for walks and light activity is ok, but no heavy (over 15 pounds) lifting, bending or excessive straining for one week.   ? Do not apply make-up to the eyes or eyelids for 2 weeks after surgery.  ? Expect some swelling, bruising, black eye (even into the lower eyelids and cheeks). Also expect serum caking, crusting and discharge from the eye and/or incisions. A small amount of surface bleeding, and depending on the type of surgery, bleeding from the inside of the eyelid, is normal for the first 48 hours.  ? Avoid straining, bending at the waist, or lifting more than 15 pounds for 1 week. Sleeping with your head elevated, such as in a recliner, for the first several days can help swelling resolve more quickly.   ? Do continue to ambulate (walk) as you normally would - being sedentary after surgery can cause blood clots.   ? Your eye(s) and eyelid(s) may be painful and tender. This is normal after surgery.      Contact information and follow-up  ? Return to the Eye Clinic for a follow-up appointment with your physician as scheduled. If no appointment has been scheduled:   - [phone] for an appointment with Dr. Henderson within 2 to 3 weeks from your date of surgery.     ? For severe pain, bleeding, or loss of vision, call the [facility] at [phone].    After hours or on weekends and holidays, call [phone] and ask to speak with the ophthalmologist on call.    An on call person can be reached after hours for concerns. The on call doctor should not call in medication refill requests after hours or on weekends, so please plan accordingly. An effort has been made to provide adequate pain medications following every surgery, and refills will not be provided in most instances.     Medications  ? Restart all regular home medications and eye drops. If you take Plavix or Aspirin on a regular basis, wait for 72 hours after your surgery before restarting these in order to decrease the risk of bleeding complications.  ? Avoid aspirin and aspirin-like medications (Motrin, Aleve, Ibuprofen, [place] etc) for 72 hours to reduce the risk of bleeding. You may take Tylenol (acetaminophen) for pain.  ? In addition to your home medications, take the following post-operative medications as prescribed by your physician.    ? Apply antibiotic ointment to all sutures three times a day, and into the operated eye(s) at night.   Once you run out, you can apply Vaseline or Aquaphor (over the counter) to the incisions. Don't put the Vaseline or Aquaphor into your eyes.   ? If you have ocular irritation, you can use over the counter artificial tears such as Refresh, Systane, or Blink. Do not use Visine, Clear Eyes, or any other drop that gets the red out.

## 2024-05-13 NOTE — TELEPHONE ENCOUNTER
Spoke with the patient about rescheduling surgery as he ate breakfast and did not have a . He said he will call back as he needs to confirm with his daughter before he reschedules.     Kacy Ramos  Surgery Scheduling Coordinator  Ph: [phone]

## 2024-05-13 NOTE — PLAN OF CARE
Patient's 0930 procedure rescheduled to 1330 today due to NPO status (had cream in his coffee at 0730). Patient arrived at 1300 and did not have a  arranged for post-op. Inquired if he could drive himself; then stated his wife would be on her way around 15:30-16:00 as she has to  their son and is too busy at work to come get patient after surgery. Patient asked if he could Uber home. Informed patient he would need to reschedule his procedure when he has transportation arranged. Patient was very pleasant and accepted this plan; told to expect a call from Dr. Henderson's office.

## 2024-06-05 ENCOUNTER — OFFICE VISIT (OUTPATIENT)
Dept: FAMILY MEDICINE | Facility: CLINIC | Age: 54
End: 2024-06-05
Payer: COMMERCIAL

## 2024-06-05 VITALS
HEIGHT: 64 IN | HEART RATE: 81 BPM | SYSTOLIC BLOOD PRESSURE: 123 MMHG | OXYGEN SATURATION: 100 % | TEMPERATURE: 97.6 F | WEIGHT: 131.6 LBS | BODY MASS INDEX: 22.47 KG/M2 | RESPIRATION RATE: 18 BRPM | DIASTOLIC BLOOD PRESSURE: 72 MMHG

## 2024-06-05 DIAGNOSIS — H02.834 DERMATOCHALASIS OF BOTH UPPER EYELIDS: ICD-10-CM

## 2024-06-05 DIAGNOSIS — J43.2 CENTRILOBULAR EMPHYSEMA (H): ICD-10-CM

## 2024-06-05 DIAGNOSIS — Z01.818 PREOP GENERAL PHYSICAL EXAM: Primary | ICD-10-CM

## 2024-06-05 DIAGNOSIS — E78.5 HYPERLIPIDEMIA LDL GOAL <100: ICD-10-CM

## 2024-06-05 DIAGNOSIS — H02.403 PTOSIS OF BOTH EYELIDS: ICD-10-CM

## 2024-06-05 DIAGNOSIS — E11.9 TYPE 2 DIABETES MELLITUS WITHOUT COMPLICATION, WITHOUT LONG-TERM CURRENT USE OF INSULIN (H): ICD-10-CM

## 2024-06-05 DIAGNOSIS — H02.831 DERMATOCHALASIS OF BOTH UPPER EYELIDS: ICD-10-CM

## 2024-06-05 PROCEDURE — 99214 OFFICE O/P EST MOD 30 MIN: CPT

## 2024-06-05 ASSESSMENT — PAIN SCALES - GENERAL: PAINLEVEL: NO PAIN (0)

## 2024-06-05 NOTE — PATIENT INSTRUCTIONS
How to Take Your Medication Before Surgery  Preoperative Medication Instructions   Antiplatelet or Anticoagulation Medication Instructions   - aspirin: Discontinue aspirin 7-10 days prior to procedure to reduce bleeding risk. It should be resumed postoperatively.     Additional Medication Instructions  Take all scheduled medications on the day of surgery EXCEPT for modifications listed below:   - metformin: DO NOT TAKE day of surgery.       Patient Education   Preparing for Your Surgery  Getting started  A nurse will call you to review your health history and instructions. They will give you an arrival time based on your scheduled surgery time. Please be ready to share:  Your doctor's clinic name and phone number  Your medical, surgical, and anesthesia history  A list of allergies and sensitivities  A list of medicines, including herbal treatments and over-the-counter drugs  Whether the patient has a legal guardian (ask how to send us the papers in advance)  Please tell us if you're pregnant--or if there's any chance you might be pregnant. Some surgeries may injure a fetus (unborn baby), so they require a pregnancy test. Surgeries that are safe for a fetus don't always need a test, and you can choose whether to have one.   If you have a child who's having surgery, please ask for a copy of Preparing for Your Child's Surgery.    Preparing for surgery  Within 10 to 30 days of surgery: Have a pre-op exam (sometimes called an H&P, or History and Physical). This can be done at a clinic or pre-operative center.  If you're having a , you may not need this exam. Talk to your care team.  At your pre-op exam, talk to your care team about all medicines you take. If you need to stop any medicines before surgery, ask when to start taking them again.  We do this for your safety. Many medicines can make you bleed too much during surgery. Some change how well surgery (anesthesia) drugs work.  Call your insurance company to  let them know you're having surgery. (If you don't have insurance, call [phone].)  Call your clinic if there's any change in your health. This includes signs of a cold or flu (sore throat, runny nose, cough, rash, fever). It also includes a scrape or scratch near the surgery site.  If you have questions on the day of surgery, call your hospital or surgery center.  Eating and drinking guidelines  For your safety: Unless your surgeon tells you otherwise, follow the guidelines below.  Eat and drink as usual until 8 hours before you arrive for surgery. After that, no food or milk.  Drink clear liquids until 2 hours before you arrive. These are liquids you can see through, like water, Gatorade, and Propel Water. They also include plain black coffee and tea (no cream or milk), candy, and breath mints. You can spit out gum when you arrive.  If you drink alcohol: Stop drinking it the night before surgery.  If your care team tells you to take medicine on the morning of surgery, it's okay to take it with a sip of water.  Preventing infection  Shower or bathe the night before and morning of your surgery. Follow the instructions your clinic gave you. (If no instructions, use regular soap.)  Don't shave or clip hair near your surgery site. We'll remove the hair if needed.  Don't smoke or vape the morning of surgery. You may chew nicotine gum up to 2 hours before surgery. A nicotine patch is okay.  Note: Some surgeries require you to completely quit smoking and nicotine. Check with your surgeon.  Your care team will make every effort to keep you safe from infection. We will:  Clean our hands often with soap and water (or an alcohol-based hand rub).  Clean the skin at your surgery site with a special soap that kills germs.  Give you a special gown to keep you warm. (Cold raises the risk of infection.)  Wear special hair covers, masks, gowns and gloves during surgery.  Give antibiotic medicine, if prescribed. Not all surgeries  need antibiotics.  What to bring on the day of surgery  Photo ID and insurance card  Copy of your health care directive, if you have one  Glasses and hearing aids (bring cases)  You can't wear contacts during surgery  Inhaler and eye drops, if you use them (tell us about these when you arrive)  CPAP machine or breathing device, if you use them  A few personal items, if spending the night  If you have . . .  A pacemaker, ICD (cardiac defibrillator) or other implant: Bring the ID card.  An implanted stimulator: Bring the remote control.  A legal guardian: Bring a copy of the certified (court-stamped) guardianship papers.  Please remove any jewelry, including body piercings. Leave jewelry and other valuables at home.  If you're going home the day of surgery  You must have a responsible adult drive you home. They should stay with you overnight as well.  If you don't have someone to stay with you, and you aren't safe to go home alone, we may keep you overnight. Insurance often won't pay for this.  After surgery  If it's hard to control your pain or you need more pain medicine, please call your surgeon's office.  Questions?   If you have any questions for your care team, list them here: _________________________________________________________________________________________________________________________________________________________________________ ____________________________________ ____________________________________ ____________________________________  For informational purposes only. Not to replace the advice of your health care provider. Copyright   2003, 2019 [facility]. All rights reserved. Clinically reviewed by Nina Romero MD. SMARTworks [id number] - REV 12/22.

## 2024-06-05 NOTE — PROGRESS NOTES
Preoperative Evaluation  [address] [address]  Phone: [phone]  Primary Provider: Physician No Ref-Primary  Pre-op Performing Provider: TONI Flowers CNP  Jun 5, 2024 6/5/2024   Surgical Information   What procedure is being done? Both upper eyelid blepharoplasty and ptosis repair      Facility or Hospital where procedure/surgery will be performed: [facility]   Who is doing the procedure / surgery? Savannah Henderson MD    Date of surgery / procedure: 6/10/24   Time of surgery / procedure: 11   Where do you plan to recover after surgery? at home with family     Fax number for surgical facility: Note does not need to be faxed, will be available electronically in Epic.    Assessment & Plan     The proposed surgical procedure is considered LOW risk.    Preop general physical exam  - Reviewed risk factors and medical and family history.  - Preoperative instructions and medication hold times reviewed with patient. Special attention paid to NPO guidelines. He has a  set up for his upcoming procedure.      Dermatochalasis of both upper eyelids  - Cleared for surgery as planned.     Ptosis of both eyelids  - Cleared for surgery as planned.     Type 2 diabetes mellitus without complication, without long-term current use of insulin (H)  - Stable on metformin. Patient instructed to hold metformin the morning of surgery and voices understanding.  - HEMOGLOBIN A1C     Hyperlipidemia LDL goal <100  - Stable. Tolerating atorvastatin without side effects.     Centrilobular emphysema (H)  - Stable. No cough, wheezing, or dyspnea.      Risks and Recommendations  The patient has the following additional risks and recommendations for perioperative complications:  Diabetes:  - Patient is not on insulin therapy: regular NPO guidelines can be followed.      Antiplatelet or Anticoagulation Medication Instructions   - aspirin: Discontinue  aspirin 7-10 days prior to procedure to reduce bleeding risk. It should be resumed postoperatively. He has been holding for the past 7 days.      Additional Medication Instructions   - Statins: Continue taking on the day of surgery.    - metformin: HOLD day of surgery.     Recommendation  APPROVAL GIVEN to proceed with proposed procedure, without further diagnostic evaluation.    Ashley Salazar is a 54 year old, presenting for the following:  Pre-Op Exam        6/5/2024    10:51 AM   Additional Questions   Roomed by DM   Accompanied by self     HPI related to upcoming procedure: gradual onset of droopy eyelids over the past years. The droopy eyelid is interfering with activities of daily living including driving, and reading. The patient denies double vision, variability of the eyelid position. Sometimes his eyes feel tired and dry. Surgery scheduled for 5/13 had to be cancelled as patient had coffee prior to procedure and did not have a . No major changes from preop completed 4/15/2024.        6/5/2024   Pre-Op Questionnaire   Have you ever had a heart attack or stroke? No   Have you ever had surgery on your heart or blood vessels, such as a stent placement, a coronary artery bypass, or surgery on an artery in your head, neck, heart, or legs? No   Do you have chest pain with activity? No   Do you have a history of heart failure? No   Do you currently have a cold, bronchitis or symptoms of other infection? No   Do you have a cough, shortness of breath, or wheezing? No   Do you or anyone in your family have previous history of blood clots? No   Do you or does anyone in your family have a serious bleeding problem such as prolonged bleeding following surgeries or cuts? No   Have you ever had problems with anemia or been told to take iron pills? No   Have you had any abnormal blood loss such as black, tarry or bloody stools? No   Have you ever had a blood transfusion? No   Are you willing to have a blood  transfusion if it is medically needed before, during, or after your surgery? Yes   Have you or any of your relatives ever had problems with anesthesia? No   Do you have sleep apnea, excessive snoring or daytime drowsiness? No   Do you have any artifical heart valves or other implanted medical devices like a pacemaker, defibrillator, or continuous glucose monitor? No   Do you have artificial joints? No   Are you allergic to latex? No     Health Care Directive  Patient does not have a Health Care Directive or Living Will: Discussed advance care planning with patient; however, patient declined at this time.    Preoperative Review of    reviewed - no record of controlled substances prescribed.}    Status of Chronic Conditions:  DIABETES - Patient has a longstanding history of DiabetesType Type II . Patient is being treated with oral agents and denies significant side effects. Control has been good. Complicating factors include but are not limited to: hyperlipidemia.     Patient Active Problem List    Diagnosis Date Noted    Ptosis of both eyelids 03/13/2024     Priority: Medium    Dermatochalasis of both upper eyelids 03/13/2024     Priority: Medium    Centrilobular emphysema (H) 01/29/2024     Priority: Medium    Diabetes mellitus, type 2 (H) 12/23/2020     Priority: Medium    Hyperlipidemia LDL goal <100 07/25/2018     Priority: Medium    Personal history of tobacco use, presenting hazards to health 01/06/2017     Priority: Medium    Gastroesophageal reflux disease without esophagitis 01/06/2017     Priority: Medium    Bladder calculi 01/27/2016     Priority: Medium    Impaired fasting glucose 01/27/2016     Priority: Medium    BPH (benign prostatic hyperplasia) 01/27/2016     Priority: Medium    Pulmonary nodules 01/26/2016     Priority: Medium    Internal hemorrhoid 07/11/2014     Priority: Medium    External hemorrhoids 03/31/2014     Priority: Medium     Problem list name updated by automated process.  Provider to review        Past Medical History:   Diagnosis Date    External hemorrhoids     Hemorrhoids, internal     Paraseptal emphysema (H) 2016    Tobacco dependence      Past Surgical History:   Procedure Laterality Date    COLONOSCOPY N/A 2024    Procedure: COLONOSCOPY, WITH POLYPECTOMY AND BIOPSY;  Surgeon: Leidy Multani DO;  Location: [facility] OR    COLONOSCOPY WITH CO2 INSUFFLATION N/A 2024    Procedure: Colonoscopy with CO2 insufflation;  Surgeon: Leidy Multani DO;  Location: [facility] OR    LIGATION OF HEMORRHOID(S)       Current Outpatient Medications   Medication Sig Dispense Refill    atorvastatin (LIPITOR) 20 MG tablet Take 1 tablet (20 mg) by mouth daily 90 tablet 3    erythromycin (ROMYCIN) 5 MG/GM ophthalmic ointment Apply small amount to incision sites three times daily and apply a half inch strip into both eyes at bedtime. 3.5 g 0    metFORMIN (GLUCOPHAGE) 500 MG tablet Take 1 tablet (500 mg) by mouth 2 times daily (with meals) 180 tablet 3    tamsulosin (FLOMAX) 0.4 MG capsule Take 1 capsule (0.4 mg) by mouth daily 90 capsule 3    aspirin (ASA) 81 MG EC tablet Take 1 tablet (81 mg) by mouth daily (Patient not taking: Reported on 2024) 90 tablet 3    hydrocortisone, Perianal, (ANUSOL-HC) 2.5 % cream PLACE RECTALLY TWICE DAILY AS NEEDED FOR HEMORRHOIDS 30 g 0       No Known Allergies     Social History     Tobacco Use    Smoking status: Former     Current packs/day: 0.00     Average packs/day: 1 pack/day for 32.0 years (32.0 ttl pk-yrs)     Types: Cigarettes     Start date: 1984     Quit date: 2016     Years since quittin.3     Passive exposure: Never    Smokeless tobacco: Never   Substance Use Topics    Alcohol use: Yes     History   Drug Use No         Review of Systems  CONSTITUTIONAL: NEGATIVE for fever, chills, change in weight  INTEGUMENTARY/SKIN: NEGATIVE for worrisome rashes, moles or lesions  EYES: NEGATIVE for vision changes or irritation  ENT/MOUTH: NEGATIVE  "for ear, mouth and throat problems  RESP: NEGATIVE for significant cough or SOB  CV: NEGATIVE for chest pain, palpitations or peripheral edema  GI: NEGATIVE for nausea, abdominal pain, heartburn, or change in bowel habits  : NEGATIVE for frequency, dysuria, or hematuria  MUSCULOSKELETAL: NEGATIVE for significant arthralgias or myalgia  NEURO: NEGATIVE for weakness, dizziness or paresthesias  ENDOCRINE: NEGATIVE for temperature intolerance, skin/hair changes  HEME: NEGATIVE for bleeding problems  PSYCHIATRIC: NEGATIVE for changes in mood or affect    Objective    /72   Pulse 81   Temp 97.6  F (36.4  C)   Resp 18   Ht 1.626 m (5' 4\")   Wt 59.7 kg (131 lb 9.6 oz)   SpO2 100%   BMI 22.59 kg/m     Estimated body mass index is 22.59 kg/m  as calculated from the following:    Height as of this encounter: 1.626 m (5' 4\").    Weight as of this encounter: 59.7 kg (131 lb 9.6 oz).    Physical Exam  GENERAL: alert and no distress  EYES: Eyes grossly normal to inspection, PERRL and conjunctivae and sclerae normal  HENT: ear canals and TM's normal, nose and mouth without ulcers or lesions  NECK: no adenopathy, no asymmetry, masses, or scars  RESP: lungs clear to auscultation - no rales, rhonchi or wheezes  CV: regular rate and rhythm, normal S1 S2, no S3 or S4, no murmur, click or rub, no peripheral edema  ABDOMEN: soft, nontender, no hepatosplenomegaly, no masses and bowel sounds normal  MS: no gross musculoskeletal defects noted, no edema  SKIN: no suspicious lesions or rashes  NEURO: Normal strength and tone, mentation intact and speech normal  PSYCH: mentation appears normal, affect normal/bright    Recent Labs   Lab Test 04/15/24  0953 09/14/23  1327   NA  --  139   POTASSIUM  --  4.4   CR  --  0.81   A1C 6.5* 6.2*      Diagnostics  No labs were ordered during this visit.   No EKG required for low risk surgery (cataract, skin procedure, breast biopsy, etc).    Revised Cardiac Risk Index (RCRI)  The patient has " the following serious cardiovascular risks for perioperative complications:   - No serious cardiac risks = 0 points     RCRI Interpretation: 0 points: Class I (very low risk - 0.4% complication rate)     Signed Electronically by: TONI Flowers CNP  Copy of this evaluation report is provided to requesting physician.

## 2024-06-07 ENCOUNTER — ANESTHESIA EVENT (OUTPATIENT)
Dept: SURGERY | Facility: AMBULATORY SURGERY CENTER | Age: 54
End: 2024-06-07
Payer: COMMERCIAL

## 2024-06-07 NOTE — ANESTHESIA PREPROCEDURE EVALUATION
Anesthesia Pre-Procedure Evaluation    Patient: Martin Salvador   MRN: [id number] : 1970        Procedure : Procedure(s):  Both upper eyelid blepharoplasty and ptosis repair          Past Medical History:   Diagnosis Date    External hemorrhoids     Hemorrhoids, internal     Paraseptal emphysema (H) 2016    Tobacco dependence       Past Surgical History:   Procedure Laterality Date    COLONOSCOPY N/A 2024    Procedure: COLONOSCOPY, WITH POLYPECTOMY AND BIOPSY;  Surgeon: Leidy Multani DO;  Location: [facility]    COLONOSCOPY WITH CO2 INSUFFLATION N/A 2024    Procedure: Colonoscopy with CO2 insufflation;  Surgeon: Leidy Multani DO;  Location: [facility] OR    LIGATION OF HEMORRHOID(S)        No Known Allergies   Social History     Tobacco Use    Smoking status: Former     Current packs/day: 0.00     Average packs/day: 1 pack/day for 32.0 years (32.0 ttl pk-yrs)     Types: Cigarettes     Start date: 1984     Quit date: 2016     Years since quittin.3     Passive exposure: Never    Smokeless tobacco: Never   Substance Use Topics    Alcohol use: Yes      Wt Readings from Last 1 Encounters:   24 59.7 kg (131 lb 9.6 oz)        Anesthesia Evaluation            ROS/MED HX  ENT/Pulmonary: Comment: Emphysema      Neurologic:  - neg neurologic ROS     Cardiovascular:     (+) Dyslipidemia - -   -  - -                                      METS/Exercise Tolerance:     Hematologic:  - neg hematologic  ROS     Musculoskeletal:  - neg musculoskeletal ROS     GI/Hepatic:     (+) GERD,                   Renal/Genitourinary:     (+)        BPH,      Endo:     (+)  type II DM,                    Psychiatric/Substance Use:  - neg psychiatric ROS     Infectious Disease:       Malignancy:       Other:            Physical Exam    Airway  airway exam normal      Mallampati: II       Respiratory Devices and Support         Dental       (+) Minor Abnormalities - some fillings, tiny chips      Cardiovascular    "cardiovascular exam normal          Pulmonary   pulmonary exam normal                OUTSIDE LABS:  CBC:   Lab Results   Component Value Date    WBC 7.9 11/27/2020    WBC 8.9 01/06/2017    HGB 15.8 11/27/2020    HGB 14.2 01/06/2017    HCT 47.2 11/27/2020    HCT 43.3 01/06/2017     11/27/2020     01/06/2017     BMP:   Lab Results   Component Value Date     09/14/2023     07/06/2022    POTASSIUM 4.4 09/14/2023    POTASSIUM 5.0 07/06/2022    CHLORIDE 99 09/14/2023    CHLORIDE 107 07/06/2022    CO2 23 09/14/2023    CO2 28 07/06/2022    BUN 18.4 09/14/2023    BUN 24 07/06/2022    CR 0.81 09/14/2023    CR 0.73 07/06/2022     (H) 05/13/2024     (H) 05/13/2024     COAGS: No results found for: \"PTT\", \"INR\", \"FIBR\"  POC: No results found for: \"BGM\", \"HCG\", \"HCGS\"  HEPATIC:   Lab Results   Component Value Date    ALBUMIN 4.0 11/27/2020    PROTTOTAL 8.2 11/27/2020    ALT 40 11/27/2020    AST 21 11/27/2020    ALKPHOS 90 11/27/2020    BILITOTAL 0.4 11/27/2020     OTHER:   Lab Results   Component Value Date    A1C 6.5 (H) 04/15/2024    HORACE 9.7 09/14/2023    TSH 3.92 02/17/2021       Anesthesia Plan    ASA Status:  2    NPO Status:  NPO Appropriate    Anesthesia Type: MAC.     - Reason for MAC: straight local not clinically adequate   Induction: Intravenous.   Maintenance: TIVA.        Consents    Anesthesia Plan(s) and associated risks, benefits, and realistic alternatives discussed. Questions answered and patient/representative(s) expressed understanding.     - Discussed: Risks, Benefits and Alternatives for BOTH SEDATION and the PROCEDURE were discussed     - Discussed with:  Patient            Postoperative Care    Pain management: IV analgesics, Oral pain medications, Multi-modal analgesia.   PONV prophylaxis: Ondansetron (or other 5HT-3), Dexamethasone or Solumedrol     Comments:               Ashu Stallings MD    I have reviewed the pertinent notes and labs in the chart from the past " 30 days and (re)examined the patient.  Any updates or changes from those notes are reflected in this note.              # DMII: A1C = 6.5 % (Ref range: 0.0 - 5.6 %) within past 6 months

## 2024-06-10 ENCOUNTER — HOSPITAL ENCOUNTER (OUTPATIENT)
Facility: AMBULATORY SURGERY CENTER | Age: 54
Discharge: HOME | End: 2024-06-10
Attending: OPHTHALMOLOGY | Admitting: OPHTHALMOLOGY
Payer: COMMERCIAL

## 2024-06-10 ENCOUNTER — ANESTHESIA (OUTPATIENT)
Dept: SURGERY | Facility: AMBULATORY SURGERY CENTER | Age: 54
End: 2024-06-10
Payer: COMMERCIAL

## 2024-06-10 VITALS
TEMPERATURE: 97.6 F | OXYGEN SATURATION: 98 % | HEART RATE: 62 BPM | DIASTOLIC BLOOD PRESSURE: 63 MMHG | SYSTOLIC BLOOD PRESSURE: 122 MMHG | RESPIRATION RATE: 16 BRPM

## 2024-06-10 DIAGNOSIS — Z98.890 POSTOPERATIVE EYE STATE: Primary | ICD-10-CM

## 2024-06-10 LAB — GLUCOSE BLDC GLUCOMTR-MCNC: 107 MG/DL (ref 70–99)

## 2024-06-10 PROCEDURE — 67904 REPAIR EYELID DEFECT: CPT | Performed by: NURSE ANESTHETIST, CERTIFIED REGISTERED

## 2024-06-10 PROCEDURE — G8916 PT W IV AB GIVEN ON TIME: HCPCS

## 2024-06-10 PROCEDURE — G8907 PT DOC NO EVENTS ON DISCHARG: HCPCS

## 2024-06-10 PROCEDURE — 67904 REPAIR EYELID DEFECT: CPT | Performed by: ANESTHESIOLOGY

## 2024-06-10 PROCEDURE — 67904 REPAIR EYELID DEFECT: CPT | Mod: E1

## 2024-06-10 PROCEDURE — 67904 REPAIR EYELID DEFECT: CPT | Mod: 50 | Performed by: OPHTHALMOLOGY

## 2024-06-10 RX ORDER — LIDOCAINE HYDROCHLORIDE 20 MG/ML
INJECTION, SOLUTION INFILTRATION; PERINEURAL PRN
Status: DISCONTINUED | OUTPATIENT
Start: 2024-06-10 | End: 2024-06-10

## 2024-06-10 RX ORDER — FENTANYL CITRATE 50 UG/ML
25 INJECTION, SOLUTION INTRAMUSCULAR; INTRAVENOUS
Status: DISCONTINUED | OUTPATIENT
Start: 2024-06-10 | End: 2024-06-11 | Stop reason: HOSPADM

## 2024-06-10 RX ORDER — PROPOFOL 10 MG/ML
INJECTION, EMULSION INTRAVENOUS PRN
Status: DISCONTINUED | OUTPATIENT
Start: 2024-06-10 | End: 2024-06-10

## 2024-06-10 RX ORDER — OXYCODONE HYDROCHLORIDE 5 MG/1
5 TABLET ORAL
Status: DISCONTINUED | OUTPATIENT
Start: 2024-06-10 | End: 2024-06-11 | Stop reason: HOSPADM

## 2024-06-10 RX ORDER — ERYTHROMYCIN 5 MG/G
OINTMENT OPHTHALMIC PRN
Status: DISCONTINUED | OUTPATIENT
Start: 2024-06-10 | End: 2024-06-10 | Stop reason: HOSPADM

## 2024-06-10 RX ORDER — FENTANYL CITRATE 50 UG/ML
50 INJECTION, SOLUTION INTRAMUSCULAR; INTRAVENOUS EVERY 5 MIN PRN
Status: DISCONTINUED | OUTPATIENT
Start: 2024-06-10 | End: 2024-06-11 | Stop reason: HOSPADM

## 2024-06-10 RX ORDER — ONDANSETRON 4 MG/1
4 TABLET, ORALLY DISINTEGRATING ORAL EVERY 30 MIN PRN
Status: DISCONTINUED | OUTPATIENT
Start: 2024-06-10 | End: 2024-06-11 | Stop reason: HOSPADM

## 2024-06-10 RX ORDER — NALOXONE HYDROCHLORIDE 0.4 MG/ML
0.1 INJECTION, SOLUTION INTRAMUSCULAR; INTRAVENOUS; SUBCUTANEOUS
Status: DISCONTINUED | OUTPATIENT
Start: 2024-06-10 | End: 2024-06-11 | Stop reason: HOSPADM

## 2024-06-10 RX ORDER — FENTANYL CITRATE 50 UG/ML
INJECTION, SOLUTION INTRAMUSCULAR; INTRAVENOUS PRN
Status: DISCONTINUED | OUTPATIENT
Start: 2024-06-10 | End: 2024-06-10

## 2024-06-10 RX ORDER — ACETAMINOPHEN 325 MG/1
975 TABLET ORAL ONCE
Status: COMPLETED | OUTPATIENT
Start: 2024-06-10 | End: 2024-06-10

## 2024-06-10 RX ORDER — ONDANSETRON 2 MG/ML
4 INJECTION INTRAMUSCULAR; INTRAVENOUS EVERY 30 MIN PRN
Status: DISCONTINUED | OUTPATIENT
Start: 2024-06-10 | End: 2024-06-11 | Stop reason: HOSPADM

## 2024-06-10 RX ORDER — TETRACAINE HYDROCHLORIDE 5 MG/ML
SOLUTION OPHTHALMIC PRN
Status: DISCONTINUED | OUTPATIENT
Start: 2024-06-10 | End: 2024-06-10 | Stop reason: HOSPADM

## 2024-06-10 RX ORDER — SODIUM CHLORIDE, SODIUM LACTATE, POTASSIUM CHLORIDE, CALCIUM CHLORIDE 600; 310; 30; 20 MG/100ML; MG/100ML; MG/100ML; MG/100ML
INJECTION, SOLUTION INTRAVENOUS CONTINUOUS
Status: DISCONTINUED | OUTPATIENT
Start: 2024-06-10 | End: 2024-06-11 | Stop reason: HOSPADM

## 2024-06-10 RX ORDER — LIDOCAINE 40 MG/G
CREAM TOPICAL
Status: DISCONTINUED | OUTPATIENT
Start: 2024-06-10 | End: 2024-06-11 | Stop reason: HOSPADM

## 2024-06-10 RX ORDER — OXYCODONE HYDROCHLORIDE 5 MG/1
10 TABLET ORAL
Status: DISCONTINUED | OUTPATIENT
Start: 2024-06-10 | End: 2024-06-11 | Stop reason: HOSPADM

## 2024-06-10 RX ORDER — FENTANYL CITRATE 50 UG/ML
25 INJECTION, SOLUTION INTRAMUSCULAR; INTRAVENOUS EVERY 5 MIN PRN
Status: DISCONTINUED | OUTPATIENT
Start: 2024-06-10 | End: 2024-06-11 | Stop reason: HOSPADM

## 2024-06-10 RX ORDER — DEXAMETHASONE SODIUM PHOSPHATE 4 MG/ML
4 INJECTION, SOLUTION INTRA-ARTICULAR; INTRALESIONAL; INTRAMUSCULAR; INTRAVENOUS; SOFT TISSUE
Status: DISCONTINUED | OUTPATIENT
Start: 2024-06-10 | End: 2024-06-11 | Stop reason: HOSPADM

## 2024-06-10 RX ORDER — ERYTHROMYCIN 5 MG/G
OINTMENT OPHTHALMIC
Qty: 3.5 G | Refills: 0 | Status: SHIPPED | OUTPATIENT
Start: 2024-06-10 | End: 2024-08-07

## 2024-06-10 RX ADMIN — SODIUM CHLORIDE, SODIUM LACTATE, POTASSIUM CHLORIDE, CALCIUM CHLORIDE: 600; 310; 30; 20 INJECTION, SOLUTION INTRAVENOUS at 13:43

## 2024-06-10 RX ADMIN — ACETAMINOPHEN 975 MG: 325 TABLET ORAL at 12:01

## 2024-06-10 RX ADMIN — LIDOCAINE HYDROCHLORIDE 60 MG: 20 INJECTION, SOLUTION INFILTRATION; PERINEURAL at 13:50

## 2024-06-10 RX ADMIN — FENTANYL CITRATE 25 MCG: 50 INJECTION, SOLUTION INTRAMUSCULAR; INTRAVENOUS at 13:47

## 2024-06-10 RX ADMIN — PROPOFOL 100 MG: 10 INJECTION, EMULSION INTRAVENOUS at 13:50

## 2024-06-10 NOTE — ANESTHESIA POSTPROCEDURE EVALUATION
Patient: Martin HORTON Salvador    Procedure: Procedure(s):  Both upper eyelid blepharoplasty and ptosis repair       Anesthesia Type:  MAC    Note:  Disposition: Outpatient   Postop Pain Control: Uneventful            Sign Out: Well controlled pain   PONV: No   Neuro/Psych: Uneventful            Sign Out: Acceptable/Baseline neuro status   Airway/Respiratory: Uneventful            Sign Out: Acceptable/Baseline resp. status   CV/Hemodynamics: Uneventful            Sign Out: Acceptable CV status; No obvious hypovolemia; No obvious fluid overload   Other NRE: NONE   DID A NON-ROUTINE EVENT OCCUR?            Last vitals:  Vitals Value Taken Time   /63 06/10/24 1457   Temp 97.6  F (36.4  C) 06/10/24 1430   Pulse 62 06/10/24 1457   Resp 16 06/10/24 1457   SpO2 98 % 06/10/24 1457       Electronically Signed By: Ashu Stallings MD  Rachele 10, 2024  3:59 PM

## 2024-06-10 NOTE — OP NOTE
PREOPERATIVE DIAGNOSES:   1. Bilateral upper eyelid dermatochalasis.   2. Bilateral upper eyelid ptosis.   POSTOPERATIVE DIAGNOSES:   1. Bilateral upper eyelid dermatochalasis.   2. Bilateral upper eyelid ptosis.   PROCEDURES:  1. Bilateral upper eyelid ptosis repair by external levator resection.  2. Bilateral upper eyelid blepharoplasty.  SURGEON: Savannah Henderson MD.  ASSISTANT: Michael Lira MD  ANESTHESIA: Monitored with local infiltration of a 50/50 mixture of 2% lidocaine with epinephrine and 0.5% Marcaine.   COMPLICATIONS: None.   ESTIMATED BLOOD LOSS: 3 mL.   SPECIMEN: * No specimens in log *  HISTORY: Martin Salvador presented with upper eyelid drooping secondary to dermatochalasis and ptosis of the upper eyelids leading to blockage of the superior visual field and interfering with activities of daily living. After the risks, benefits and alternatives to the proposed procedure were explained, informed consent was obtained.   PROCEDURE: The patient was brought to the operating room and placed supine on the operating table. IV sedation was given. Each upper lid crease and the excess upper eyelid skin  was marked in a blepharoplasty ellipse with a marking pen.  These areas were all infiltrated with local anesthetic and  was prepped and draped in the typical sterile fashion for oculoplastic surgery. Attention was directed to the right side. The skin was incised following the marked lines. The skin flap was excised with a high temperature cautery. Hemostasis was obtained with high temperature cautery. The orbicularis and orbital septum were opened horizontally and levator aponeurosis identified and dissected from the superior tarsal border. Nasal and central fat was conservatively debulked. A strip of pretarsal orbicularis was removed. A 7-0 Vicryl was used to advance levator aponeurosis and to help create a lid crease. Attention was directed to the left side where the same procedure was performed. The sutures  were adjusted for symmetry then tied in a permanent fashion.  Each skin incision was closed with a running 6-0 plain gut suture. Ophthalmic antibiotic ointment was applied to the incisions and into both eyes. The patient tolerated the procedure well and left the operating room in stable condition.   Savannah Henderson MD

## 2024-06-10 NOTE — BRIEF OP NOTE
[facility]    Brief Operative Note    Pre-operative diagnosis: Ptosis of both eyelids [H02.403]  Dermatochalasis of both upper eyelids [H02.831, H02.834]  Post-operative diagnosis Same as pre-operative diagnosis    Procedure: Both upper eyelid blepharoplasty and ptosis repair, Bilateral - Eye    Surgeon: Surgeons and Role:     * Savannah Henderson MD - Primary  Anesthesia: MAC with Local   Estimated Blood Loss: Minimal    Drains: None  Specimens: * No specimens in log *  Findings:   None.  Complications: None.  Implants: * No implants in log *

## 2024-06-10 NOTE — DISCHARGE INSTRUCTIONS
Post-operative Instructions  Ophthalmic Plastic and Reconstructive Surgery    Savannah Henderson M.D.     All instructions apply to the operated eye(s) or eyelid(s).    Wound care and personal care  ? Apply ice compresses and gentle pressure 15 minutes on, 15 minutes off, for 2 days. If you are sleeping, you don't need to wake up to ice. As long as there is no further bleeding, after two days, switch to warm water compresses for five minutes, four times a day until seen by your physician.   ? You may shower or wash your hair the day after surgery. Do not go swimming for at least 2 weeks to prevent contamination of your wounds.  ? You may go for walks and light activity is ok, but no heavy (over 15 pounds) lifting, bending or excessive straining for one week.   ? Do not apply make-up to the eyes or eyelids for 2 weeks after surgery.  ? Expect some swelling, bruising, black eye (even into the lower eyelids and cheeks). Also expect serum caking, crusting and discharge from the eye and/or incisions. A small amount of surface bleeding, and depending on the type of surgery, bleeding from the inside of the eyelid, is normal for the first 48 hours.  ? Avoid straining, bending at the waist, or lifting more than 15 pounds for 1 week. Sleeping with your head elevated, such as in a recliner, for the first several days can help swelling resolve more quickly.   ? Do continue to ambulate (walk) as you normally would - being sedentary after surgery can cause blood clots.   ? Your eye(s) and eyelid(s) may be painful and tender. This is normal after surgery.      Contact information and follow-up  ? Return to the Eye Clinic for a follow-up appointment with your physician as scheduled. If no appointment has been scheduled:   - [phone] for an appointment with Dr. Henderson within 2 to 3 weeks from your date of surgery.     ? For severe pain, bleeding, or loss of vision, call the [facility] at [phone].    After hours or on weekends and holidays, call [phone] and ask to speak with the ophthalmologist on call.    An on call person can be reached after hours for concerns. The on call doctor should not call in medication refill requests after hours or on weekends, so please plan accordingly. An effort has been made to provide adequate pain medications following every surgery, and refills will not be provided in most instances.     Medications  ? Restart all regular home medications and eye drops. If you take Plavix or Aspirin on a regular basis, wait for 72 hours after your surgery before restarting these in order to decrease the risk of bleeding complications.  ? Avoid aspirin and aspirin-like medications (Motrin, Aleve, Ibuprofen, [place]ie[place] etc) for 72 hours to reduce the risk of bleeding. You may take Tylenol (acetaminophen) for pain. Next dose may be taken at 6:00 PM.  Do not exceed 4,000 mg in 24 hours.  ? In addition to your home medications, take the following post-operative medications as prescribed by your physician.    ? Apply antibiotic ointment to all sutures three times a day, and into the operated eye(s) at night.   Once you run out, you can apply Vaseline or Aquaphor (over the counter) to the incisions. Don't put the Vaseline or Aquaphor into your eyes.   ? If you have ocular irritation, you can use over the counter artificial tears such as Refresh, Systane, or Blink. Do not use Visine, Clear Eyes, or any other drop that gets the red out.   ? In many cases, postoperative discomfort can be managed with Tylenol alone.

## 2024-06-10 NOTE — ANESTHESIA CARE TRANSFER NOTE
Patient: Martin HORTON Salvador    Procedure: Procedure(s):  Both upper eyelid blepharoplasty and ptosis repair       Diagnosis: Ptosis of both eyelids [H02.403]  Dermatochalasis of both upper eyelids [H02.831, H02.834]  Diagnosis Additional Information: No value filed.    Anesthesia Type:   MAC     Note:    Oropharynx: oropharynx clear of all foreign objects  Level of Consciousness: awake  Oxygen Supplementation: room air    Independent Airway: airway patency not satisfactory and stable  Dentition: dentition changed  Vital Signs Stable: post-procedure vital signs reviewed and stable  Report to RN Given: handoff report given  Patient transferred to: Phase II  Comments: To Phase II. Report to RN.  VSS Resp status stable.  Handoff Report: Identifed the Patient, Identified the Reponsible Provider, Reviewed the pertinent medical history, Discussed the surgical course, Reviewed Intra-OP anesthesia mangement and issues during anesthesia, Set expectations for post-procedure period and Allowed opportunity for questions and acknowledgement of understanding    Vitals:  Vitals Value Taken Time   BP     Temp     Pulse     Resp     SpO2         Electronically Signed By: TONI Raymundo CRNA  Rachele 10, 2024  2:36 PM

## 2024-06-12 ENCOUNTER — OFFICE VISIT (OUTPATIENT)
Dept: OPHTHALMOLOGY | Facility: CLINIC | Age: 54
End: 2024-06-12
Payer: COMMERCIAL

## 2024-06-12 DIAGNOSIS — H02.403 PTOSIS OF BOTH EYELIDS: Primary | ICD-10-CM

## 2024-06-12 DIAGNOSIS — H02.831 DERMATOCHALASIS OF BOTH UPPER EYELIDS: ICD-10-CM

## 2024-06-12 DIAGNOSIS — N40.1 BENIGN PROSTATIC HYPERPLASIA WITH URINARY FREQUENCY: ICD-10-CM

## 2024-06-12 DIAGNOSIS — H02.834 DERMATOCHALASIS OF BOTH UPPER EYELIDS: ICD-10-CM

## 2024-06-12 DIAGNOSIS — R35.0 BENIGN PROSTATIC HYPERPLASIA WITH URINARY FREQUENCY: ICD-10-CM

## 2024-06-12 DIAGNOSIS — E11.9 TYPE 2 DIABETES MELLITUS WITHOUT COMPLICATION, WITHOUT LONG-TERM CURRENT USE OF INSULIN (H): ICD-10-CM

## 2024-06-12 PROCEDURE — 99024 POSTOP FOLLOW-UP VISIT: CPT | Performed by: OPHTHALMOLOGY

## 2024-06-12 RX ORDER — TAMSULOSIN HYDROCHLORIDE 0.4 MG/1
0.4 CAPSULE ORAL DAILY
Qty: 90 CAPSULE | Refills: 3 | OUTPATIENT
Start: 2024-06-12

## 2024-06-12 ASSESSMENT — VISUAL ACUITY
CORRECTION_TYPE: GLASSES
METHOD: SNELLEN - LINEAR
OD_CC+: -1
OD_CC: 20/25
OS_CC: 20/20
OS_CC+: -2

## 2024-06-12 ASSESSMENT — TONOMETRY
IOP_METHOD: ICARE
OD_IOP_MMHG: 17
OS_IOP_MMHG: 18

## 2024-06-12 NOTE — NURSING NOTE
Chief Complaints and History of Present Illnesses   Patient presents with    Post Op (Ophthalmology) Both Eyes     Chief Complaint(s) and History of Present Illness(es)       Post Op (Ophthalmology) Both Eyes              Laterality: both eyes              Comments    Pt returns for 2 day post-op following bilateral upper lid blepharoplasty and bilateral ptosis repair 6/10/2024. Pt notes some mild bruising and swelling of the lower lids in the morning, but no discomfort. He continues on the erythromycin alana as directed.

## 2024-06-12 NOTE — PROGRESS NOTES
"Chief Complaint(s) and History of Present Illness(es)     Post Op (Ophthalmology) Both Eyes            Laterality: both eyes          Comments    Pt returns for 2 day post-op following bilateral upper lid blepharoplasty   and bilateral ptosis repair 6/10/2024. Pt notes some mild bruising and   swelling of the lower lids in the morning, but no discomfort. He continues   on the erythromycin alana as directed.               Doing well. Happy with outcome.    Patient Instructions   Use ice frequently over both eyes for one more day, then start to apply warm compresses for 1 minute two to three times a day until your bruising has resolved. You can continue this for one more month.   - Cool compresses can help with swelling and itching, you can alternate cool compresses with warm compresses if you find it helpful.  - Apply the prescribed ointment to your incision three times a day. After you run out, you can use Aquaphor or Vaseline to the incision at bedtime until it is smooth.    - If you have symptoms of eye irritation, it is good to use over the counter artificial tears. Good brands include Refresh, Blink, and Systane. Do NOT get drops that are for \"red eyes.\"   - It is normal for the incision to appear raised, red, itch and have small lumps. You can gently massage any small bumps along the incision line. These can take up to six months to resolve.    Return in about 2 months (around 8/12/2024).      Attending Physician Attestation: Complete documentation of historical and exam elements from today's encounter can be found in the full encounter summary report (not reduplicated in this progress note). I personally obtained the chief complaint(s) and history of present illness. I confirmed and edited as necessary the review of systems, past medical/surgical history, family history, social history, and examination findings as documented by others; and I examined the patient myself. I personally reviewed the relevant tests, " images, and reports as documented above. I formulated and edited as necessary the assessment and plan and discussed the findings and management plan with the patient and family. I personally reviewed the ophthalmic test(s) associated with this encounter, agree with the interpretation(s) as documented by the resident/fellow, and have edited the corresponding report(s) as necessary. Savannah Henderson MD

## 2024-06-12 NOTE — PATIENT INSTRUCTIONS
"Use ice frequently over both eyes for one more day, then start to apply warm compresses for 1 minute two to three times a day until your bruising has resolved. You can continue this for one more month.   - Cool compresses can help with swelling and itching, you can alternate cool compresses with warm compresses if you find it helpful.  - Apply the prescribed ointment to your incision three times a day. After you run out, you can use Aquaphor or Vaseline to the incision at bedtime until it is smooth.    - If you have symptoms of eye irritation, it is good to use over the counter artificial tears. Good brands include Refresh, Blink, and Systane. Do NOT get drops that are for \"red eyes.\"   - It is normal for the incision to appear raised, red, itch and have small lumps. You can gently massage any small bumps along the incision line. These can take up to six months to resolve.    "

## 2024-07-02 ENCOUNTER — OFFICE VISIT (OUTPATIENT)
Dept: SURGERY | Facility: CLINIC | Age: 54
End: 2024-07-02
Payer: COMMERCIAL

## 2024-07-02 VITALS
BODY MASS INDEX: 23.06 KG/M2 | SYSTOLIC BLOOD PRESSURE: 126 MMHG | OXYGEN SATURATION: 98 % | WEIGHT: 135.1 LBS | DIASTOLIC BLOOD PRESSURE: 74 MMHG | HEART RATE: 81 BPM | HEIGHT: 64 IN

## 2024-07-02 DIAGNOSIS — K64.8 HEMORRHOID PROLAPSE: Primary | ICD-10-CM

## 2024-07-02 PROCEDURE — 46221 LIGATION OF HEMORRHOID(S): CPT | Mod: 58 | Performed by: NURSE PRACTITIONER

## 2024-07-02 PROCEDURE — 99213 OFFICE O/P EST LOW 20 MIN: CPT | Mod: 24 | Performed by: NURSE PRACTITIONER

## 2024-07-02 ASSESSMENT — PAIN SCALES - GENERAL: PAINLEVEL: NO PAIN (0)

## 2024-07-02 NOTE — LETTER
"2024       RE: Martin Salvador  [address]  [place] [address]       Dear Colleague,    Thank you for referring your patient, Martin Salvador, to the [facility] COLON AND RE[facility]AL SURGERY [facility]IN[facility] [place] at [facility]. Please see a copy of my visit note below.    Colon and Rectal Surgery Consult Clinic Note     Referring provider:  Referred Self, MD  No address on file     RE: Martin Salvador  : 1970  AYAN: 2024    Martin Salvador is a very pleasant 54 year old male here for hemorrhoids.    HPI:  I saw Martin in April with hemorrhoid prolapse. We discussed surgical hemorrhoidectomy versus serial banding but he wanted to try a fiber supplement and then banding. He has been on three fiber tablets a day. Symptoms are the same with prolapsing tissue. No bleeding or pain. Is not on any blood thinners.  Colonoscopy 24 was normal.    Physical Examination: Exam was chaperoned by Kayla Salcedo MA   /74 (BP Location: Left arm, Patient Position: Sitting, Cuff Size: Adult Regular)   Pulse 81   Ht 5' 4\"   Wt 135 lb 1.6 oz   SpO2 98%   BMI 23.19 kg/m    General: alert, oriented, in no acute distress, sitting comfortably  HEENT: mucous membranes moist    Perianal external examination:  Perianal skin: Intact with no excoriation or lichenification.  Lesions: No evidence of an external lesion, nodularity, or induration in the perianal region.  Eversion of buttocks: There was not evidence of an anal fissure. Details: N/A.  Skin tags or external hemorrhoids: None.    Digital rectal examination: Was performed.   Sphincter tone: Good.  Palpable lesions: No.  Prostate: Normal.  Other: None.    Anoscopy: Was performed.   Hemorrhoids: Yes. Grade 3 internal hemorrhoids without bleeding.  Lesions: No    Procedure:  After discussing the risks and benefits, the patient agreed to proceed with internal hemorrhoidal banding.    Prior to the start of the " procedure and with procedural staff participation, I verbally confirmed the patient s identity using two indicators, relevant allergies, that the procedure was appropriate and matched the consent or emergent situation, and that the correct equipment/implants were available. Immediately prior to starting the procedure I conducted the Time Out with the procedural staff and re-confirmed the patient s name, procedure, and site/side. (The Joint Commission universal protocol was followed.)  Yes    Sedation (Moderate or Deep): None    A suction hemorrhoidal  was used to place a total of 1 band(s) in the right posterior position(s).    There was no significant bleeding. The patient tolerated the procedure well.    This procedure was performed under a collaborative privileging agreement with Dr. Jase Monterroso, Chief Division of Colon and Rectal Surgery    Assessment/Plan: 54 year old male with hemorrhoid prolapse. Discussed managing these with hemorrhoidal banding. Discussed that several banding procedures may be needed if symptoms persist. Patient states an understanding of this and states an understanding that there is a small risk of bleeding today and when the band falls off in 1-2 weeks. Also advised patient that there is a remote chance of infection. Recommended avoiding heavy lifting and remain off aspirin for two weeks. Patient verbalized an understanding of these risks and wished to proceed today.       Medical history:  Past Medical History:   Diagnosis Date    External hemorrhoids     Hemorrhoids, internal     Paraseptal emphysema (H) 1/26/2016    Tobacco dependence        Surgical history:  Past Surgical History:   Procedure Laterality Date    COLONOSCOPY N/A 4/11/2024    Procedure: COLONOSCOPY, WITH POLYPECTOMY AND BIOPSY;  Surgeon: Leidy Multani DO;  Location: [facility] OR    COLONOSCOPY WITH CO2 INSUFFLATION N/A 4/11/2024    Procedure: Colonoscopy with CO2 insufflation;  Surgeon: Leidy Multani DO;   Location: [facility] OR    COMBINED REPAIR PTOSIS WITH BLEPHAROPLASTY BILATERAL Bilateral 6/10/2024    Procedure: Both upper eyelid blepharoplasty and ptosis repair;  Surgeon: Savannah Henderson MD;  Location: [facility] OR    LIGATION OF HEMORRHOID(S)         Problem list:    Patient Active Problem List    Diagnosis Date Noted    Ptosis of both eyelids 03/13/2024     Priority: Medium    Dermatochalasis of both upper eyelids 03/13/2024     Priority: Medium    Centrilobular emphysema (H) 01/29/2024     Priority: Medium    Diabetes mellitus, type 2 (H) 12/23/2020     Priority: Medium    Hyperlipidemia LDL goal <100 07/25/2018     Priority: Medium    Personal history of tobacco use, presenting hazards to health 01/06/2017     Priority: Medium    Gastroesophageal reflux disease without esophagitis 01/06/2017     Priority: Medium    Bladder calculi 01/27/2016     Priority: Medium    Impaired fasting glucose 01/27/2016     Priority: Medium    BPH (benign prostatic hyperplasia) 01/27/2016     Priority: Medium    Pulmonary nodules 01/26/2016     Priority: Medium    Internal hemorrhoid 07/11/2014     Priority: Medium    External hemorrhoids 03/31/2014     Priority: Medium     Problem list name updated by automated process. Provider to review         Medications:  Current Outpatient Medications   Medication Sig Dispense Refill    atorvastatin (LIPITOR) 20 MG tablet Take 1 tablet (20 mg) by mouth daily 90 tablet 3    erythromycin (ROMYCIN) 5 MG/GM ophthalmic ointment Apply small amount to incision sites three times daily, then apply to inner lower lid of operative eye(s) at bedtime, as directed. 3.5 g 0    erythromycin (ROMYCIN) 5 MG/GM ophthalmic ointment Apply small amount to incision sites three times daily and apply a half inch strip into both eyes at bedtime. 3.5 g 0    hydrocortisone, Perianal, (ANUSOL-HC) 2.5 % cream PLACE RECTALLY TWICE DAILY AS NEEDED FOR HEMORRHOIDS 30 g 0    metFORMIN (GLUCOPHAGE) 500 MG tablet Take 1 tablet (500  "mg) by mouth 2 times daily (with meals) 180 tablet 3    tamsulosin (FLOMAX) 0.4 MG capsule Take 1 capsule (0.4 mg) by mouth daily 90 capsule 3    aspirin (ASA) 81 MG EC tablet Take 1 tablet (81 mg) by mouth daily (Patient not taking: Reported on 2024) 90 tablet 3       Allergies:  No Known Allergies    Family history:  Family History   Problem Relation Age of Onset    Diabetes Mother        Social history:  Social History     Tobacco Use    Smoking status: Former     Current packs/day: 0.00     Average packs/day: 1 pack/day for 32.0 years (32.0 ttl pk-yrs)     Types: Cigarettes     Start date: 1984     Quit date: 2016     Years since quittin.4     Passive exposure: Never    Smokeless tobacco: Never   Substance Use Topics    Alcohol use: Yes    Marital status: .  Nursing Notes:   Kayla Salcedo  2024 12:53 PM  Signed  Chief Complaint   Patient presents with    Follow Up       Vitals:    24 1251   BP: 126/74   BP Location: Left arm   Patient Position: Sitting   Cuff Size: Adult Regular   Pulse: 81   SpO2: 98%   Weight: 135 lb 1.6 oz   Height: 5' 4\"       Body mass index is 23.19 kg/m .    Kayla Salcedo CMA       10 minutes spent on the date of encounter performing chart review, history and exam, documentation and further activities as noted above with an additional 5 minutes for anoscopy and banding.       This note was created using speech recognition software and may contain unintended word substitutions.        Again, thank you for allowing me to participate in the care of your patient.      Sincerely,    TONI Portillo CNP    "

## 2024-07-02 NOTE — PROGRESS NOTES
"Colon and Rectal Surgery Consult Clinic Note     Referring provider:  Referred Self, MD  No address on file     RE: Martin Salvador  : 1970  AYAN: 2024    Martin Salvador is a very pleasant 54 year old male here for hemorrhoids.    HPI:  I saw Martin in April with hemorrhoid prolapse. We discussed surgical hemorrhoidectomy versus serial banding but he wanted to try a fiber supplement and then banding. He has been on three fiber tablets a day. Symptoms are the same with prolapsing tissue. No bleeding or pain. Is not on any blood thinners.  Colonoscopy 24 was normal.    Physical Examination: Exam was chaperoned by Kayla Salcedo MA   /74 (BP Location: Left arm, Patient Position: Sitting, Cuff Size: Adult Regular)   Pulse 81   Ht 5' 4\"   Wt 135 lb 1.6 oz   SpO2 98%   BMI 23.19 kg/m    General: alert, oriented, in no acute distress, sitting comfortably  HEENT: mucous membranes moist    Perianal external examination:  Perianal skin: Intact with no excoriation or lichenification.  Lesions: No evidence of an external lesion, nodularity, or induration in the perianal region.  Eversion of buttocks: There was not evidence of an anal fissure. Details: N/A.  Skin tags or external hemorrhoids: None.    Digital rectal examination: Was performed.   Sphincter tone: Good.  Palpable lesions: No.  Prostate: Normal.  Other: None.    Anoscopy: Was performed.   Hemorrhoids: Yes. Grade 3 internal hemorrhoids without bleeding.  Lesions: No    Procedure:  After discussing the risks and benefits, the patient agreed to proceed with internal hemorrhoidal banding.    Prior to the start of the procedure and with procedural staff participation, I verbally confirmed the patient s identity using two indicators, relevant allergies, that the procedure was appropriate and matched the consent or emergent situation, and that the correct equipment/implants were available. Immediately prior to starting the procedure I conducted the " Time Out with the procedural staff and re-confirmed the patient s name, procedure, and site/side. (The Joint Commission universal protocol was followed.)  Yes    Sedation (Moderate or Deep): None    A suction hemorrhoidal  was used to place a total of 1 band(s) in the right posterior position(s).    There was no significant bleeding. The patient tolerated the procedure well.    This procedure was performed under a collaborative privileging agreement with Dr. Jase Monterroso, Chief Division of Colon and Rectal Surgery    Assessment/Plan: 54 year old male with hemorrhoid prolapse. Discussed managing these with hemorrhoidal banding. Discussed that several banding procedures may be needed if symptoms persist. Patient states an understanding of this and states an understanding that there is a small risk of bleeding today and when the band falls off in 1-2 weeks. Also advised patient that there is a remote chance of infection. Recommended avoiding heavy lifting and remain off aspirin for two weeks. Patient verbalized an understanding of these risks and wished to proceed today.       Medical history:  Past Medical History:   Diagnosis Date    External hemorrhoids     Hemorrhoids, internal     Paraseptal emphysema (H) 1/26/2016    Tobacco dependence        Surgical history:  Past Surgical History:   Procedure Laterality Date    COLONOSCOPY N/A 4/11/2024    Procedure: COLONOSCOPY, WITH POLYPECTOMY AND BIOPSY;  Surgeon: Leidy Multani DO;  Location: [facility] OR    COLONOSCOPY WITH CO2 INSUFFLATION N/A 4/11/2024    Procedure: Colonoscopy with CO2 insufflation;  Surgeon: Leidy Multani DO;  Location: [facility] OR    COMBINED REPAIR PTOSIS WITH BLEPHAROPLASTY BILATERAL Bilateral 6/10/2024    Procedure: Both upper eyelid blepharoplasty and ptosis repair;  Surgeon: Savannah Henderson MD;  Location: [facility] OR    LIGATION OF HEMORRHOID(S)         Problem list:    Patient Active Problem List    Diagnosis Date Noted    Ptosis of both  eyelids 03/13/2024     Priority: Medium    Dermatochalasis of both upper eyelids 03/13/2024     Priority: Medium    Centrilobular emphysema (H) 01/29/2024     Priority: Medium    Diabetes mellitus, type 2 (H) 12/23/2020     Priority: Medium    Hyperlipidemia LDL goal <100 07/25/2018     Priority: Medium    Personal history of tobacco use, presenting hazards to health 01/06/2017     Priority: Medium    Gastroesophageal reflux disease without esophagitis 01/06/2017     Priority: Medium    Bladder calculi 01/27/2016     Priority: Medium    Impaired fasting glucose 01/27/2016     Priority: Medium    BPH (benign prostatic hyperplasia) 01/27/2016     Priority: Medium    Pulmonary nodules 01/26/2016     Priority: Medium    Internal hemorrhoid 07/11/2014     Priority: Medium    External hemorrhoids 03/31/2014     Priority: Medium     Problem list name updated by automated process. Provider to review         Medications:  Current Outpatient Medications   Medication Sig Dispense Refill    atorvastatin (LIPITOR) 20 MG tablet Take 1 tablet (20 mg) by mouth daily 90 tablet 3    erythromycin (ROMYCIN) 5 MG/GM ophthalmic ointment Apply small amount to incision sites three times daily, then apply to inner lower lid of operative eye(s) at bedtime, as directed. 3.5 g 0    erythromycin (ROMYCIN) 5 MG/GM ophthalmic ointment Apply small amount to incision sites three times daily and apply a half inch strip into both eyes at bedtime. 3.5 g 0    hydrocortisone, Perianal, (ANUSOL-HC) 2.5 % cream PLACE RECTALLY TWICE DAILY AS NEEDED FOR HEMORRHOIDS 30 g 0    metFORMIN (GLUCOPHAGE) 500 MG tablet Take 1 tablet (500 mg) by mouth 2 times daily (with meals) 180 tablet 3    tamsulosin (FLOMAX) 0.4 MG capsule Take 1 capsule (0.4 mg) by mouth daily 90 capsule 3    aspirin (ASA) 81 MG EC tablet Take 1 tablet (81 mg) by mouth daily (Patient not taking: Reported on 6/5/2024) 90 tablet 3       Allergies:  No Known Allergies    Family history:  Family  "History   Problem Relation Age of Onset    Diabetes Mother        Social history:  Social History     Tobacco Use    Smoking status: Former     Current packs/day: 0.00     Average packs/day: 1 pack/day for 32.0 years (32.0 ttl pk-yrs)     Types: Cigarettes     Start date: 1984     Quit date: 2016     Years since quittin.4     Passive exposure: Never    Smokeless tobacco: Never   Substance Use Topics    Alcohol use: Yes    Marital status: .  Nursing Notes:   Kayla Salcedo  2024 12:53 PM  Signed  Chief Complaint   Patient presents with    Follow Up       Vitals:    24 1251   BP: 126/74   BP Location: Left arm   Patient Position: Sitting   Cuff Size: Adult Regular   Pulse: 81   SpO2: 98%   Weight: 135 lb 1.6 oz   Height: 5' 4\"       Body mass index is 23.19 kg/m .    Kayla Salcedo CMA       10 minutes spent on the date of encounter performing chart review, history and exam, documentation and further activities as noted above with an additional 5 minutes for anoscopy and banding.     TONI Posada, NP-C  Colon and Rectal Surgery   [facility]    This note was created using speech recognition software and may contain unintended word substitutions.    "

## 2024-07-02 NOTE — NURSING NOTE
"Chief Complaint   Patient presents with    Follow Up       Vitals:    07/02/24 1251   BP: 126/74   BP Location: Left arm   Patient Position: Sitting   Cuff Size: Adult Regular   Pulse: 81   SpO2: 98%   Weight: 135 lb 1.6 oz   Height: 5' 4\"       Body mass index is 23.19 kg/m .    Kayla Salcedo CMA    "

## 2024-07-02 NOTE — PATIENT INSTRUCTIONS
1) I performed hemorrhoidal banding today  2) You may return in 4 weeks for another banding if symptoms continue.  3) There is a small risk of bleeding and remote chance of infection. Contact us in the interim if there are any concerns.   5) Try a fiber supplement such as Citrucel or Metamucil powder 2-3 times a day for constipation  6) Drink 8-10 glasses of water with this  7) Can additionally us stool softeners or a laxative such as Miralax to keep stools soft  8) No heavy lifting or aspirin use for 2 weeks

## 2024-08-07 ENCOUNTER — OFFICE VISIT (OUTPATIENT)
Dept: OPHTHALMOLOGY | Facility: CLINIC | Age: 54
End: 2024-08-07
Payer: COMMERCIAL

## 2024-08-07 DIAGNOSIS — H02.834 DERMATOCHALASIS OF BOTH UPPER EYELIDS: ICD-10-CM

## 2024-08-07 DIAGNOSIS — H02.831 DERMATOCHALASIS OF BOTH UPPER EYELIDS: ICD-10-CM

## 2024-08-07 DIAGNOSIS — H02.403 PTOSIS OF BOTH EYELIDS: Primary | ICD-10-CM

## 2024-08-07 PROCEDURE — 99024 POSTOP FOLLOW-UP VISIT: CPT | Performed by: OPHTHALMOLOGY

## 2024-08-07 ASSESSMENT — VISUAL ACUITY
OS_CC: 20/20
OD_CC: 20/15
METHOD: SNELLEN - LINEAR
CORRECTION_TYPE: GLASSES

## 2024-08-07 ASSESSMENT — TONOMETRY
IOP_METHOD: ICARE
OS_IOP_MMHG: 17
OD_IOP_MMHG: 19

## 2024-08-07 NOTE — LETTER
2024         RE:  :  MRN: Martin Salvador  1970  [id number]     Dear Dr. Dr. Thomas and Mrs. Sharpe,    Your patient, Martin Salvador, returned for oculoplastic follow up. My assessment and plan are below.  For further details, please see my attached clinic note.      Chief Complaint(s) and History of Present Illness(es)     Post Op (Ophthalmology) Both Eyes            Laterality: both eyes          Comments    S/p 1. Bilateral upper eyelid ptosis repair by external levator   resection.2. Bilateral upper eyelid blepharoplasty. 6/10/24. No   problems/concerns today. Feels things have healed well. Not using any   drops or ointments at this time  Adilia Cerrato COT.       Martin Salvador is about 2 months status post op.  He is doing well and he is pleased with the improvement in peripheral vision and aesthetic improvement. He is wondering if he can have a complete eye exam, as he would like new glasses. He is 20/15 and 20/20 today. Ok to return to Dr. Thomas for a complete eye exam.     He will follow up with me on an as needed basis.    Referred by TONI Flowers CNP.   Regular eye doctor Ajit Thomas, OD          Again, thank you for allowing me to participate in the care of your patient.      Sincerely,  Savannah Henderson MD    Oculoplastic and Orbital Surgery   Department of Ophthalmology and Visual Neurosciences  [facility]        CC: Ajit Thomas  [facility]  [address]  [place] [address]  Via Fax: [phone]     TONI Flowers CNP  [address] [facility] [address]  Via In Basket

## 2024-08-07 NOTE — NURSING NOTE
Chief Complaints and History of Present Illnesses   Patient presents with    Post Op (Ophthalmology) Both Eyes       Chief Complaint(s) and History of Present Illness(es)       Post Op (Ophthalmology) Both Eyes              Laterality: both eyes              Comments    S/p 1. Bilateral upper eyelid ptosis repair by external levator resection.2. Bilateral upper eyelid blepharoplasty. 6/10/24. No problems/concerns today. Feels things have healed well. Not using any drops or ointments at this time                     Adilia Cerrato, COT

## 2024-08-07 NOTE — PROGRESS NOTES
Chief Complaint(s) and History of Present Illness(es)     Post Op (Ophthalmology) Both Eyes            Laterality: both eyes          Comments    S/p 1. Bilateral upper eyelid ptosis repair by external levator   resection.2. Bilateral upper eyelid blepharoplasty. 6/10/24. No   problems/concerns today. Feels things have healed well. Not using any   drops or ointments at this time  Adilia Salvador is about 2 months status post op.  He is doing well and he is pleased with the improvement in peripheral vision and aesthetic improvement. He is wondering if he can have a complete eye exam, as he would like new glasses. He is 20/15 and 20/20 today. Ok to return to Dr. Thomas for a complete eye exam.     He will follow up with me on an as needed basis.    Referred by TONI Flowers CNP.   Regular eye doctor Ajit Thomas, DIONISIO     Complete documentation of historical and exam elements from today's encounter can be found in the full encounter summary report (not reduplicated in this progress note). I personally obtained the chief complaint(s) and history of present illness.  I confirmed and edited as necessary the review of systems, past medical/surgical history, family history, social history, and examination findings as documented by others; and I examined the patient myself. I personally reviewed the relevant tests, images, and reports as documented above. I formulated and edited as necessary the assessment and plan and discussed the findings and management plan with the patient and family. - Savannah Henderson MD

## 2024-08-22 ENCOUNTER — OFFICE VISIT (OUTPATIENT)
Dept: SURGERY | Facility: CLINIC | Age: 54
End: 2024-08-22
Payer: COMMERCIAL

## 2024-08-22 VITALS
HEIGHT: 64 IN | HEART RATE: 77 BPM | BODY MASS INDEX: 23.05 KG/M2 | DIASTOLIC BLOOD PRESSURE: 72 MMHG | OXYGEN SATURATION: 100 % | WEIGHT: 135 LBS | SYSTOLIC BLOOD PRESSURE: 116 MMHG

## 2024-08-22 DIAGNOSIS — K64.8 HEMORRHOID PROLAPSE: Primary | ICD-10-CM

## 2024-08-22 PROCEDURE — 46221 LIGATION OF HEMORRHOID(S): CPT | Mod: 58 | Performed by: NURSE PRACTITIONER

## 2024-08-22 PROCEDURE — 99213 OFFICE O/P EST LOW 20 MIN: CPT | Mod: 25 | Performed by: NURSE PRACTITIONER

## 2024-08-22 ASSESSMENT — PAIN SCALES - GENERAL: PAINLEVEL: NO PAIN (0)

## 2024-08-22 NOTE — LETTER
"2024       RE: Martin Salvador  [address]  [place] [address]     Dear Colleague,    Thank you for referring your patient, Martin Salvador, to the [facility] COLON AND RE[facility]AL SURGERY CLINIC [place] [facility]. Please see a copy of my visit note below.    Colon and Rectal Surgery Consult Clinic Note     Referring provider:  Referred Self, MD  No address on file     RE: Martin Salvador  : 1970  AYAN: 2024    Martin Salvador is a very pleasant 54 year old male here for hemorrhoids.    HPI:  I saw Martin in the past with hemorrhoid prolapse. He continues on a daily fiber supplement. Symptoms have improved but still some prolapsing tissue. No bleeding or pain. Is not on any blood thinners.  Colonoscopy 24 was normal.    Physical Examination: Exam was chaperoned by Kayla Salcedo MA   /72 (BP Location: Left arm, Patient Position: Sitting, Cuff Size: Adult Regular)   Pulse 77   Ht 5' 4\"   Wt 135 lb   SpO2 100%   BMI 23.17 kg/m    General: alert, oriented, in no acute distress, sitting comfortably  HEENT: mucous membranes moist    Perianal external examination:  Perianal skin: Intact with no excoriation or lichenification.  Lesions: No evidence of an external lesion, nodularity, or induration in the perianal region.  Eversion of buttocks: There was not evidence of an anal fissure. Details: N/A.  Skin tags or external hemorrhoids: None.    Digital rectal examination: Was performed.   Sphincter tone: Good.  Palpable lesions: No.  Prostate: Normal.  Other: None.    Anoscopy: Was performed.   Hemorrhoids: Yes. Grade 2-3 internal hemorrhoids without bleeding.  Lesions: No    Procedure:  After discussing the risks and benefits, the patient agreed to proceed with internal hemorrhoidal banding.    Prior to the start of the procedure and with procedural staff participation, I verbally confirmed the patient s identity using two " indicators, relevant allergies, that the procedure was appropriate and matched the consent or emergent situation, and that the correct equipment/implants were available. Immediately prior to starting the procedure I conducted the Time Out with the procedural staff and re-confirmed the patient s name, procedure, and site/side. (The Joint Commission universal protocol was followed.)  Yes    Sedation (Moderate or Deep): None    A suction hemorrhoidal  was used to place a total of 2 band(s) in the right posterior and anterior position(s).    There was no significant bleeding. The patient tolerated the procedure well.    This procedure was performed under a collaborative privileging agreement with Dr. Jase Monterroso, Chief Division of Colon and Rectal Surgery    Assessment/Plan: 54 year old male with hemorrhoid prolapse. Discussed managing these with hemorrhoidal banding. Discussed that several banding procedures may be needed if symptoms persist. Patient states an understanding of this and states an understanding that there is a small risk of bleeding today and when the band falls off in 1-2 weeks. Also advised patient that there is a remote chance of infection. Recommended avoiding heavy lifting and remain off aspirin for two weeks. Patient verbalized an understanding of these risks and wished to proceed today. He tolerated banding well. Will have him follow up anytime after 4 weeks with any persistent symptoms. Patient's questions were answered to his stated satisfaction and he is in agreement with this plan.       Medical history:  Past Medical History:   Diagnosis Date     External hemorrhoids      Hemorrhoids, internal      Paraseptal emphysema (H) 1/26/2016     Tobacco dependence        Surgical history:  Past Surgical History:   Procedure Laterality Date     COLONOSCOPY N/A 4/11/2024    Procedure: COLONOSCOPY, WITH POLYPECTOMY AND BIOPSY;  Surgeon: Leidy Multani DO;  Location:  OR     COLONOSCOPY  WITH CO2 INSUFFLATION N/A 4/11/2024    Procedure: Colonoscopy with CO2 insufflation;  Surgeon: Leidy Multani DO;  Location: [facility] OR     COMBINED REPAIR PTOSIS WITH BLEPHAROPLASTY BILATERAL Bilateral 6/10/2024    Procedure: Both upper eyelid blepharoplasty and ptosis repair;  Surgeon: Savannah Henderson MD;  Location: [facility] OR     LIGATION OF HEMORRHOID(S)         Problem list:    Patient Active Problem List    Diagnosis Date Noted     Ptosis of both eyelids 03/13/2024     Priority: Medium     Dermatochalasis of both upper eyelids 03/13/2024     Priority: Medium     Centrilobular emphysema (H) 01/29/2024     Priority: Medium     Diabetes mellitus, type 2 (H) 12/23/2020     Priority: Medium     Hyperlipidemia LDL goal <100 07/25/2018     Priority: Medium     Personal history of tobacco use, presenting hazards to health 01/06/2017     Priority: Medium     Gastroesophageal reflux disease without esophagitis 01/06/2017     Priority: Medium     Bladder calculi 01/27/2016     Priority: Medium     Impaired fasting glucose 01/27/2016     Priority: Medium     BPH (benign prostatic hyperplasia) 01/27/2016     Priority: Medium     Pulmonary nodules 01/26/2016     Priority: Medium     Internal hemorrhoid 07/11/2014     Priority: Medium     External hemorrhoids 03/31/2014     Priority: Medium     Problem list name updated by automated process. Provider to review         Medications:  Current Outpatient Medications   Medication Sig Dispense Refill     aspirin (ASA) 81 MG EC tablet Take 1 tablet (81 mg) by mouth daily 90 tablet 3     atorvastatin (LIPITOR) 20 MG tablet Take 1 tablet (20 mg) by mouth daily 90 tablet 3     hydrocortisone, Perianal, (ANUSOL-HC) 2.5 % cream PLACE RECTALLY TWICE DAILY AS NEEDED FOR HEMORRHOIDS 30 g 0     metFORMIN (GLUCOPHAGE) 500 MG tablet Take 1 tablet (500 mg) by mouth 2 times daily (with meals) 180 tablet 3     tamsulosin (FLOMAX) 0.4 MG capsule Take 1 capsule (0.4 mg) by mouth daily 90 capsule 3  "      Allergies:  No Known Allergies    Family history:  Family History   Problem Relation Age of Onset     Diabetes Mother        Social history:  Social History     Tobacco Use     Smoking status: Former     Current packs/day: 0.00     Average packs/day: 1 pack/day for 32.0 years (32.0 ttl pk-yrs)     Types: Cigarettes     Start date: 1984     Quit date: 2016     Years since quittin.6     Passive exposure: Never     Smokeless tobacco: Never   Substance Use Topics     Alcohol use: Yes    Marital status: .  Nursing Notes:   Kayla Salcedo  2024  9:08 AM  Signed  Chief Complaint   Patient presents with     Follow Up       Vitals:    24 0907   BP: 116/72   BP Location: Left arm   Patient Position: Sitting   Cuff Size: Adult Regular   Pulse: 77   SpO2: 100%   Weight: 135 lb   Height: 5' 4\"       Body mass index is 23.17 kg/m .    Kayla Salcedo CMA       5 minutes spent on the date of encounter performing chart review, history and exam, documentation and further activities as noted above with an additional 5 minutes for anoscopy and banding.     TONI Posada, NP-C  Colon and Rectal Surgery   [facility]    This note was created using speech recognition software and may contain unintended word substitutions.      Again, thank you for allowing me to participate in the care of your patient.      Sincerely,    TONI Posada CNP    "

## 2024-08-22 NOTE — PROGRESS NOTES
"Colon and Rectal Surgery Consult Clinic Note     Referring provider:  Referred Self, MD  No address on file     RE: Martin Salvador  : 1970  AYAN: 2024    Martin Salvador is a very pleasant 54 year old male here for hemorrhoids.    HPI:  I saw Martin in the past with hemorrhoid prolapse. He continues on a daily fiber supplement. Symptoms have improved but still some prolapsing tissue. No bleeding or pain. Is not on any blood thinners.  Colonoscopy 24 was normal.    Physical Examination: Exam was chaperoned by Kayla Salcedo MA   /72 (BP Location: Left arm, Patient Position: Sitting, Cuff Size: Adult Regular)   Pulse 77   Ht 5' 4\"   Wt 135 lb   SpO2 100%   BMI 23.17 kg/m    General: alert, oriented, in no acute distress, sitting comfortably  HEENT: mucous membranes moist    Perianal external examination:  Perianal skin: Intact with no excoriation or lichenification.  Lesions: No evidence of an external lesion, nodularity, or induration in the perianal region.  Eversion of buttocks: There was not evidence of an anal fissure. Details: N/A.  Skin tags or external hemorrhoids: None.    Digital rectal examination: Was performed.   Sphincter tone: Good.  Palpable lesions: No.  Prostate: Normal.  Other: None.    Anoscopy: Was performed.   Hemorrhoids: Yes. Grade 2-3 internal hemorrhoids without bleeding.  Lesions: No    Procedure:  After discussing the risks and benefits, the patient agreed to proceed with internal hemorrhoidal banding.    Prior to the start of the procedure and with procedural staff participation, I verbally confirmed the patient s identity using two indicators, relevant allergies, that the procedure was appropriate and matched the consent or emergent situation, and that the correct equipment/implants were available. Immediately prior to starting the procedure I conducted the Time Out with the procedural staff and re-confirmed the patient s name, procedure, and site/side. (The Joint " Commission universal protocol was followed.)  Yes    Sedation (Moderate or Deep): None    A suction hemorrhoidal  was used to place a total of 2 band(s) in the right posterior and anterior position(s).    There was no significant bleeding. The patient tolerated the procedure well.    This procedure was performed under a collaborative privileging agreement with Dr. Jase Monterroso, Chief Division of Colon and Rectal Surgery    Assessment/Plan: 54 year old male with hemorrhoid prolapse. Discussed managing these with hemorrhoidal banding. Discussed that several banding procedures may be needed if symptoms persist. Patient states an understanding of this and states an understanding that there is a small risk of bleeding today and when the band falls off in 1-2 weeks. Also advised patient that there is a remote chance of infection. Recommended avoiding heavy lifting and remain off aspirin for two weeks. Patient verbalized an understanding of these risks and wished to proceed today. He tolerated banding well. Will have him follow up anytime after 4 weeks with any persistent symptoms. Patient's questions were answered to his stated satisfaction and he is in agreement with this plan.       Medical history:  Past Medical History:   Diagnosis Date    External hemorrhoids     Hemorrhoids, internal     Paraseptal emphysema (H) 1/26/2016    Tobacco dependence        Surgical history:  Past Surgical History:   Procedure Laterality Date    COLONOSCOPY N/A 4/11/2024    Procedure: COLONOSCOPY, WITH POLYPECTOMY AND BIOPSY;  Surgeon: Leidy Multani DO;  Location: [facility] OR    COLONOSCOPY WITH CO2 INSUFFLATION N/A 4/11/2024    Procedure: Colonoscopy with CO2 insufflation;  Surgeon: Leidy Multani DO;  Location: [facility] OR    COMBINED REPAIR PTOSIS WITH BLEPHAROPLASTY BILATERAL Bilateral 6/10/2024    Procedure: Both upper eyelid blepharoplasty and ptosis repair;  Surgeon: Savannah Henderson MD;  Location: [facility] OR    LIGATION OF  HEMORRHOID(S)         Problem list:    Patient Active Problem List    Diagnosis Date Noted    Ptosis of both eyelids 03/13/2024     Priority: Medium    Dermatochalasis of both upper eyelids 03/13/2024     Priority: Medium    Centrilobular emphysema (H) 01/29/2024     Priority: Medium    Diabetes mellitus, type 2 (H) 12/23/2020     Priority: Medium    Hyperlipidemia LDL goal <100 07/25/2018     Priority: Medium    Personal history of tobacco use, presenting hazards to health 01/06/2017     Priority: Medium    Gastroesophageal reflux disease without esophagitis 01/06/2017     Priority: Medium    Bladder calculi 01/27/2016     Priority: Medium    Impaired fasting glucose 01/27/2016     Priority: Medium    BPH (benign prostatic hyperplasia) 01/27/2016     Priority: Medium    Pulmonary nodules 01/26/2016     Priority: Medium    Internal hemorrhoid 07/11/2014     Priority: Medium    External hemorrhoids 03/31/2014     Priority: Medium     Problem list name updated by automated process. Provider to review         Medications:  Current Outpatient Medications   Medication Sig Dispense Refill    aspirin (ASA) 81 MG EC tablet Take 1 tablet (81 mg) by mouth daily 90 tablet 3    atorvastatin (LIPITOR) 20 MG tablet Take 1 tablet (20 mg) by mouth daily 90 tablet 3    hydrocortisone, Perianal, (ANUSOL-HC) 2.5 % cream PLACE RECTALLY TWICE DAILY AS NEEDED FOR HEMORRHOIDS 30 g 0    metFORMIN (GLUCOPHAGE) 500 MG tablet Take 1 tablet (500 mg) by mouth 2 times daily (with meals) 180 tablet 3    tamsulosin (FLOMAX) 0.4 MG capsule Take 1 capsule (0.4 mg) by mouth daily 90 capsule 3       Allergies:  No Known Allergies    Family history:  Family History   Problem Relation Age of Onset    Diabetes Mother        Social history:  Social History     Tobacco Use    Smoking status: Former     Current packs/day: 0.00     Average packs/day: 1 pack/day for 32.0 years (32.0 ttl pk-yrs)     Types: Cigarettes     Start date: 1/1/1984     Quit date:  "2016     Years since quittin.6     Passive exposure: Never    Smokeless tobacco: Never   Substance Use Topics    Alcohol use: Yes    Marital status: .  Nursing Notes:   Kayla Salcedo  2024  9:08 AM  Signed  Chief Complaint   Patient presents with    Follow Up       Vitals:    24 0907   BP: 116/72   BP Location: Left arm   Patient Position: Sitting   Cuff Size: Adult Regular   Pulse: 77   SpO2: 100%   Weight: 135 lb   Height: 5' 4\"       Body mass index is 23.17 kg/m .    Kayla Salcedo CMA       5 minutes spent on the date of encounter performing chart review, history and exam, documentation and further activities as noted above with an additional 5 minutes for anoscopy and banding.     TONI Posada, NP-C  Colon and Rectal Surgery   [facility]    This note was created using speech recognition software and may contain unintended word substitutions.    "

## 2024-08-22 NOTE — NURSING NOTE
"Chief Complaint   Patient presents with    Follow Up       Vitals:    08/22/24 0907   BP: 116/72   BP Location: Left arm   Patient Position: Sitting   Cuff Size: Adult Regular   Pulse: 77   SpO2: 100%   Weight: 135 lb   Height: 5' 4\"       Body mass index is 23.17 kg/m .    Kayla Salcedo CMA    "

## 2024-09-18 ENCOUNTER — OFFICE VISIT (OUTPATIENT)
Dept: FAMILY MEDICINE | Facility: CLINIC | Age: 54
End: 2024-09-18
Payer: COMMERCIAL

## 2024-09-18 VITALS
SYSTOLIC BLOOD PRESSURE: 123 MMHG | OXYGEN SATURATION: 100 % | TEMPERATURE: 97.1 F | RESPIRATION RATE: 20 BRPM | WEIGHT: 131 LBS | HEART RATE: 64 BPM | HEIGHT: 65 IN | BODY MASS INDEX: 21.83 KG/M2 | DIASTOLIC BLOOD PRESSURE: 78 MMHG

## 2024-09-18 DIAGNOSIS — E11.9 TYPE 2 DIABETES MELLITUS WITHOUT COMPLICATION, WITHOUT LONG-TERM CURRENT USE OF INSULIN (H): ICD-10-CM

## 2024-09-18 DIAGNOSIS — Z87.891 HISTORY OF TOBACCO USE: ICD-10-CM

## 2024-09-18 DIAGNOSIS — R35.1 BENIGN PROSTATIC HYPERPLASIA WITH NOCTURIA: Primary | ICD-10-CM

## 2024-09-18 DIAGNOSIS — N40.1 BENIGN PROSTATIC HYPERPLASIA WITH NOCTURIA: Primary | ICD-10-CM

## 2024-09-18 LAB
ALBUMIN UR-MCNC: NEGATIVE MG/DL
APPEARANCE UR: CLEAR
BILIRUB UR QL STRIP: NEGATIVE
COLOR UR AUTO: YELLOW
EST. AVERAGE GLUCOSE BLD GHB EST-MCNC: 137 MG/DL
GLUCOSE UR STRIP-MCNC: NEGATIVE MG/DL
HBA1C MFR BLD: 6.4 % (ref 0–5.6)
HGB UR QL STRIP: NEGATIVE
KETONES UR STRIP-MCNC: NEGATIVE MG/DL
LEUKOCYTE ESTERASE UR QL STRIP: NEGATIVE
NITRATE UR QL: NEGATIVE
PH UR STRIP: 5.5 [PH] (ref 5–7)
RBC #/AREA URNS AUTO: NORMAL /HPF
SP GR UR STRIP: 1.02 (ref 1–1.03)
UROBILINOGEN UR STRIP-ACNC: 0.2 E.U./DL
WBC #/AREA URNS AUTO: NORMAL /HPF

## 2024-09-18 PROCEDURE — 99213 OFFICE O/P EST LOW 20 MIN: CPT | Mod: 25

## 2024-09-18 PROCEDURE — 81001 URINALYSIS AUTO W/SCOPE: CPT

## 2024-09-18 PROCEDURE — 36415 COLL VENOUS BLD VENIPUNCTURE: CPT

## 2024-09-18 PROCEDURE — 90715 TDAP VACCINE 7 YRS/> IM: CPT

## 2024-09-18 PROCEDURE — 80048 BASIC METABOLIC PNL TOTAL CA: CPT

## 2024-09-18 PROCEDURE — 83036 HEMOGLOBIN GLYCOSYLATED A1C: CPT

## 2024-09-18 PROCEDURE — 80061 LIPID PANEL: CPT

## 2024-09-18 PROCEDURE — 82570 ASSAY OF URINE CREATININE: CPT

## 2024-09-18 PROCEDURE — 82043 UR ALBUMIN QUANTITATIVE: CPT

## 2024-09-18 PROCEDURE — 90471 IMMUNIZATION ADMIN: CPT

## 2024-09-18 RX ORDER — TAMSULOSIN HYDROCHLORIDE 0.4 MG/1
0.4 CAPSULE ORAL DAILY
Qty: 90 CAPSULE | Refills: 3 | Status: SHIPPED | OUTPATIENT
Start: 2024-09-18

## 2024-09-18 ASSESSMENT — PAIN SCALES - GENERAL: PAINLEVEL: NO PAIN (0)

## 2024-09-18 NOTE — PROGRESS NOTES
Assessment & Plan     Benign prostatic hyperplasia with nocturia  - Symptoms consistent with BPH. Discussed etiology and treatment at length, stressed the importance of taking tamsulosin daily for symptoms.   - Discussed bladder irritants including caffeine, alcohol, spicy foods. Recommended limiting fluid intake prior to bed, double voiding.  - Consider urology referral if symptoms do not improve with consistent treatment.  - tamsulosin (FLOMAX) 0.4 MG capsule  Dispense: 90 capsule; Refill: 3  - UA with Microscopic reflex to Culture - lab collect    Type 2 diabetes mellitus without complication, without long-term current use of insulin (H)  - Due for labs. Continue metformin.   - HEMOGLOBIN A1C  - BASIC METABOLIC PANEL  - Lipid panel reflex to direct LDL Non-fasting  - Albumin Random Urine Quantitative with Creat Ratio    History of tobacco use  - Due for Chest CT for surveillance. Discussed with patient and ordered.   - CT Chest Lung Cancer Scrn Low Dose wo    Due for annual physical and preventive services, encouraged him to schedule.     Ashley Salazar is a 54 year old, presenting for the following health issues:  UTI      9/18/2024    10:37 AM   Additional Questions   Roomed by sherine   Accompanied by self     UTI    History of Present Illness       Reason for visit:  Pea    He eats 2-3 servings of fruits and vegetables daily.He consumes 1 sweetened beverage(s) daily.He exercises with enough effort to increase his heart rate 9 or less minutes per day.  He exercises with enough effort to increase his heart rate 3 or less days per week. He is missing 2 dose(s) of medications per week.     Genitourinary - Male  Onset/Duration: 1 year   Description:   Dysuria (painful urination): No}  Hematuria (blood in urine): No  Frequency: YES  Waking at night to urinate: YES  Hesitancy (delay in urine): YES  Retention (unable to empty): No  Decrease in urinary flow: N/A  Incontinence: No  Progression of Symptoms:   "same  Accompanying Signs & Symptoms:  Fever: No  Back/Flank pain: No  Urethral discharge: N/A  Testicle lumps/masses/pain: N/A  Nausea and/or vomiting: N/A  Abdominal pain: No  History:   History of frequent UTI s: No  History of kidney stones: YES  History of hernias: No  Personal or Family history of Prostate problems: No  Sexually active: N/A  Precipitating or alleviating factors: None  Therapies tried and outcome: none    History of BPH. Patient reports he has tamsulosin but is not taking regularly. He was unaware that tamsulosin was to treat his urinary symptoms. Urinating frequently at night, often with an hour or less between. Feels sensation of incomplete emptying. Does have history of kidney stones, he is not having pain but is concerned he may have a stone. Symptoms are chronic. Increased urinary frequency with coffee intake. Last PSA in May 2024 in normal range.     Review of Systems  Constitutional, HEENT, cardiovascular, pulmonary, gi and gu systems are negative, except as otherwise noted.      Objective    /78 (BP Location: Left arm, Patient Position: Chair, Cuff Size: Adult Regular)   Pulse 64   Temp 97.1  F (36.2  C) (Tympanic)   Resp 20   Ht 1.638 m (5' 4.5\")   Wt 59.4 kg (131 lb)   SpO2 100%   BMI 22.14 kg/m    Body mass index is 22.14 kg/m .    Physical Exam   GENERAL: alert and no distress  NECK: no adenopathy, no asymmetry, masses, or scars  RESP: lungs clear to auscultation - no rales, rhonchi or wheezes  CV: regular rate and rhythm, normal S1 S2, no S3 or S4, no murmur, click or rub, no peripheral edema  ABDOMEN: soft, nontender, no hepatosplenomegaly, no masses and bowel sounds normal  MS: no gross musculoskeletal defects noted, no edema    Signed Electronically by: TONI Flowers CNP    "

## 2024-09-18 NOTE — NURSING NOTE
Prior to immunization administration, verified patients identity using patient s name and date of birth. Please see Immunization Activity for additional information.     Screening Questionnaire for Adult Immunization    Are you sick today?   No   Do you have allergies to medications, food, a vaccine component or latex?   No   Have you ever had a serious reaction after receiving a vaccination?   No   Do you have a long-term health problem with heart, lung, kidney, or metabolic disease (e.g., diabetes), asthma, a blood disorder, no spleen, complement component deficiency, a cochlear implant, or a spinal fluid leak?  Are you on long-term aspirin therapy?   No   Do you have cancer, leukemia, HIV/AIDS, or any other immune system problem?   No   Do you have a parent, brother, or sister with an immune system problem?   No   In the past 3 months, have you taken medications that affect  your immune system, such as prednisone, other steroids, or anticancer drugs; drugs for the treatment of rheumatoid arthritis, Crohn s disease, or psoriasis; or have you had radiation treatments?   No   Have you had a seizure, or a brain or other nervous system problem?   No   During the past year, have you received a transfusion of blood or blood    products, or been given immune (gamma) globulin or antiviral drug?   No   For women: Are you pregnant or is there a chance you could become       pregnant during the next month?   No   Have you received any vaccinations in the past 4 weeks?   No     Immunization questionnaire answers were all negative.      Patient instructed to remain in clinic for 15 minutes afterwards, and to report any adverse reactions.     Screening performed by Megan Maurer MA on 9/18/2024 at 11:32 AM.

## 2024-09-18 NOTE — LETTER
September 19, 2024      Martin Salvador  [address]  [facility], [place] [address]          [facility] have reviewed your results. Your electrolytes and kidney function are in normal range. Your A1C is stable, meaning your diabetes is well controlled. No changes are needed to your medications.     Your cholesterol has improved but is still high. It is important to take your atorvastatin every day.     Your urine test was normal.     Please let me know if you have any questions or concerns. Have a great day!     TONI Flowers CNP   Resulted Orders   HEMOGLOBIN A1C   Result Value Ref Range    Estimated Average Glucose 137 (H) <117 mg/dL    Hemoglobin A1C 6.4 (H) 0.0 - 5.6 %      Comment:      Normal <5.7%   Prediabetes 5.7-6.4%    Diabetes 6.5% or higher     Note: Adopted from ADA consensus guidelines.   BASIC METABOLIC PANEL   Result Value Ref Range    Sodium 139 135 - 145 mmol/L    Potassium 4.7 3.4 - 5.3 mmol/L    Chloride 102 98 - 107 mmol/L    Carbon Dioxide (CO2) 26 22 - 29 mmol/L    Anion Gap 11 7 - 15 mmol/L    Urea Nitrogen 16.6 6.0 - 20.0 mg/dL    Creatinine 0.75 0.67 - 1.17 mg/dL    GFR Estimate >90 >60 mL/min/1.73m2      Comment:      eGFR calculated using 2021 CKD-EPI equation.    Calcium 9.4 8.8 - 10.4 mg/dL      Comment:      Reference intervals for this test were updated on 7/16/2024 to reflect our healthy population more accurately. There may be differences in the flagging of prior results with similar values performed with this method. Those prior results can be interpreted in the context of the updated reference intervals.    Glucose 101 (H) 70 - 99 mg/dL    Patient Fasting > 8hrs? No    Lipid panel reflex to direct LDL Non-fasting   Result Value Ref Range    Cholesterol 180 <200 mg/dL    Triglycerides 177 (H) <150 mg/dL    Direct Measure HDL 36 (L) >=40 mg/dL    LDL Cholesterol Calculated 109 (H) <100 mg/dL    Non HDL Cholesterol 144 (H) <130 mg/dL    Patient Fasting > 8hrs? No      Narrative    Cholesterol  Desirable: < 200 mg/dL  Borderline High: 200 - 239 mg/dL  High: >= 240 mg/dL    Triglycerides  Normal: < 150 mg/dL  Borderline High: 150 - 199 mg/dL  High: 200-499 mg/dL  Very High: >= 500 mg/dL    Direct Measure HDL  Female: >= 50 mg/dL   Male: >= 40 mg/dL    LDL Cholesterol  Desirable: < 100 mg/dL  Above Desirable: 100 - 129 mg/dL   Borderline High: 130 - 159 mg/dL   High:  160 - 189 mg/dL   Very High: >= 190 mg/dL    Non HDL Cholesterol  Desirable: < 130 mg/dL  Above Desirable: 130 - 159 mg/dL  Borderline High: 160 - 189 mg/dL  High: 190 - 219 mg/dL  Very High: >= 220 mg/dL   Albumin Random Urine Quantitative with Creat Ratio   Result Value Ref Range    Creatinine Urine mg/dL 114.0 mg/dL      Comment:      The reference ranges have not been established in urine creatinine. The results should be integrated into the clinical context for interpretation.    Albumin Urine mg/L <12.0 mg/L      Comment:      The reference ranges have not been established in urine albumin. The results should be integrated into the clinical context for interpretation.    Albumin Urine mg/g Cr        Comment:      Unable to calculate, urine albumin and/or urine creatinine is outside detectable limits.  Microalbuminuria is defined as an albumin:creatinine ratio of 17 to 299 for males and 25 to 299 for females. A ratio of albumin:creatinine of 300 or higher is indicative of overt proteinuria.  Due to biologic variability, positive results should be confirmed by a second, first-morning random or 24-hour timed urine specimen. If there is discrepancy, a third specimen is recommended. When 2 out of 3 results are in the microalbuminuria range, this is evidence for incipient nephropathy and warrants increased efforts at glucose control, blood pressure control, and institution of therapy with an angiotensin-converting-enzyme (ACE) inhibitor (if the patient can tolerate it).     UA with Microscopic reflex to Culture - lab  collect   Result Value Ref Range    Color Urine Yellow Colorless, Straw, Light Yellow, Yellow    Appearance Urine Clear Clear    Glucose Urine Negative Negative mg/dL    Bilirubin Urine Negative Negative    Ketones Urine Negative Negative mg/dL    Specific Gravity Urine 1.025 1.003 - 1.035    Blood Urine Negative Negative    pH Urine 5.5 5.0 - 7.0    Protein Albumin Urine Negative Negative mg/dL    Urobilinogen Urine 0.2 0.2, 1.0 E.U./dL    Nitrite Urine Negative Negative    Leukocyte Esterase Urine Negative Negative   UA Microscopic with Reflex to Culture   Result Value Ref Range    RBC Urine None Seen 0-2 /HPF /HPF    WBC Urine None Seen 0-5 /HPF /HPF    Narrative    Urine Culture not indicated       If you have any questions or concerns, please call the clinic at the number listed above.

## 2024-09-19 LAB
ANION GAP SERPL CALCULATED.3IONS-SCNC: 11 MMOL/L (ref 7–15)
BUN SERPL-MCNC: 16.6 MG/DL (ref 6–20)
CALCIUM SERPL-MCNC: 9.4 MG/DL (ref 8.8–10.4)
CHLORIDE SERPL-SCNC: 102 MMOL/L (ref 98–107)
CHOLEST SERPL-MCNC: 180 MG/DL
CREAT SERPL-MCNC: 0.75 MG/DL (ref 0.67–1.17)
CREAT UR-MCNC: 114 MG/DL
EGFRCR SERPLBLD CKD-EPI 2021: >90 ML/MIN/1.73M2
FASTING STATUS PATIENT QL REPORTED: NO
FASTING STATUS PATIENT QL REPORTED: NO
GLUCOSE SERPL-MCNC: 101 MG/DL (ref 70–99)
HCO3 SERPL-SCNC: 26 MMOL/L (ref 22–29)
HDLC SERPL-MCNC: 36 MG/DL
LDLC SERPL CALC-MCNC: 109 MG/DL
MICROALBUMIN UR-MCNC: <12 MG/L
MICROALBUMIN/CREAT UR: NORMAL MG/G{CREAT}
NONHDLC SERPL-MCNC: 144 MG/DL
POTASSIUM SERPL-SCNC: 4.7 MMOL/L (ref 3.4–5.3)
SODIUM SERPL-SCNC: 139 MMOL/L (ref 135–145)
TRIGL SERPL-MCNC: 177 MG/DL

## 2024-10-15 DIAGNOSIS — E78.5 HYPERLIPIDEMIA LDL GOAL <100: ICD-10-CM

## 2024-10-15 RX ORDER — ATORVASTATIN CALCIUM 20 MG/1
20 TABLET, FILM COATED ORAL DAILY
Qty: 90 TABLET | Refills: 0 | Status: SHIPPED | OUTPATIENT
Start: 2024-10-15

## 2025-03-06 ENCOUNTER — OFFICE VISIT (OUTPATIENT)
Dept: FAMILY MEDICINE | Facility: CLINIC | Age: 55
End: 2025-03-06
Payer: COMMERCIAL

## 2025-03-06 VITALS
HEIGHT: 64 IN | OXYGEN SATURATION: 100 % | RESPIRATION RATE: 16 BRPM | SYSTOLIC BLOOD PRESSURE: 108 MMHG | WEIGHT: 133.8 LBS | TEMPERATURE: 97.2 F | DIASTOLIC BLOOD PRESSURE: 72 MMHG | HEART RATE: 91 BPM | BODY MASS INDEX: 22.84 KG/M2

## 2025-03-06 DIAGNOSIS — R35.1 BENIGN PROSTATIC HYPERPLASIA WITH NOCTURIA: Primary | ICD-10-CM

## 2025-03-06 DIAGNOSIS — J43.2 CENTRILOBULAR EMPHYSEMA (H): ICD-10-CM

## 2025-03-06 DIAGNOSIS — K21.9 GASTROESOPHAGEAL REFLUX DISEASE WITHOUT ESOPHAGITIS: ICD-10-CM

## 2025-03-06 DIAGNOSIS — E11.9 TYPE 2 DIABETES MELLITUS WITHOUT COMPLICATION, WITHOUT LONG-TERM CURRENT USE OF INSULIN (H): ICD-10-CM

## 2025-03-06 DIAGNOSIS — N40.1 BENIGN PROSTATIC HYPERPLASIA WITH NOCTURIA: Primary | ICD-10-CM

## 2025-03-06 LAB
EST. AVERAGE GLUCOSE BLD GHB EST-MCNC: 146 MG/DL
HBA1C MFR BLD: 6.7 % (ref 0–5.6)

## 2025-03-06 PROCEDURE — 3074F SYST BP LT 130 MM HG: CPT

## 2025-03-06 PROCEDURE — 99214 OFFICE O/P EST MOD 30 MIN: CPT

## 2025-03-06 PROCEDURE — 3078F DIAST BP <80 MM HG: CPT

## 2025-03-06 PROCEDURE — G2211 COMPLEX E/M VISIT ADD ON: HCPCS

## 2025-03-06 PROCEDURE — 36415 COLL VENOUS BLD VENIPUNCTURE: CPT

## 2025-03-06 PROCEDURE — 1126F AMNT PAIN NOTED NONE PRSNT: CPT

## 2025-03-06 PROCEDURE — 83036 HEMOGLOBIN GLYCOSYLATED A1C: CPT

## 2025-03-06 RX ORDER — MIRABEGRON 25 MG/1
25 TABLET, FILM COATED, EXTENDED RELEASE ORAL DAILY
Qty: 30 TABLET | Refills: 2 | Status: SHIPPED | OUTPATIENT
Start: 2025-03-06

## 2025-03-06 RX ORDER — TAMSULOSIN HYDROCHLORIDE 0.4 MG/1
0.8 CAPSULE ORAL DAILY
Qty: 90 CAPSULE | Refills: 1 | Status: SHIPPED | OUTPATIENT
Start: 2025-03-06 | End: 2025-03-06

## 2025-03-06 RX ORDER — PANTOPRAZOLE SODIUM 40 MG/1
40 TABLET, DELAYED RELEASE ORAL DAILY
Qty: 90 TABLET | Refills: 1 | Status: SHIPPED | OUTPATIENT
Start: 2025-03-06

## 2025-03-06 RX ORDER — TAMSULOSIN HYDROCHLORIDE 0.4 MG/1
0.4 CAPSULE ORAL DAILY
Qty: 90 CAPSULE | Refills: 1 | Status: SHIPPED | OUTPATIENT
Start: 2025-03-06

## 2025-03-06 ASSESSMENT — PAIN SCALES - GENERAL: PAINLEVEL_OUTOF10: NO PAIN (0)

## 2025-03-06 NOTE — LETTER
March 6, 2025      Martin Salvador  [address]  [facility] [place] [address]        Dear ,    We are writing to inform you of your test results.    I have reviewed your results. Your A1C is stable. No changes are needed to your metformin right now- take one tablet with breakfast, and one with dinner.    Resulted Orders   Hemoglobin A1c   Result Value Ref Range    Estimated Average Glucose 146 (H) <117 mg/dL    Hemoglobin A1C 6.7 (H) 0.0 - 5.6 %      Comment:      Normal <5.7%   Prediabetes 5.7-6.4%    Diabetes 6.5% or higher     Note: Adopted from ADA consensus guidelines.       If you have any questions or concerns, please call the clinic at the number listed above.       Sincerely,      TONI Flowers CNP    Electronically signed

## 2025-03-06 NOTE — PROGRESS NOTES
"  Assessment & Plan     Benign prostatic hyperplasia with nocturia  ***    Centrilobular emphysema (H)  ***    Type 2 diabetes mellitus without complication, without long-term current use of insulin (H)  ***    Gastroesophageal reflux disease without esophagitis  ***    Return to care if symptoms worsen or fail to improveReed Salazar is a 54 year old, presenting for the following health issues:  Urinary Problem        3/6/2025     9:47 AM   Additional Questions   Roomed by Jamar CASTELLANO      Genitourinary - Male  Onset/Duration: 2-3 years  Description:   Dysuria (painful urination): No}  Hematuria (blood in urine): No  Frequency: YES- only at night  Waking at night to urinate: YES  Hesitancy (delay in urine): YES  Retention (unable to empty): No  Decrease in urinary flow: YES- sometimes  Incontinence: No  Progression of Symptoms:  same and intermittent  Accompanying Signs & Symptoms:  Fever: No  Back/Flank pain: No  Urethral discharge: No  Testicle lumps/masses/pain: No  Nausea and/or vomiting: No  Abdominal pain: YES- in the morning  History:   History of frequent UTI s: No  History of kidney stones: YES- once  History of hernias: No  Personal or Family history of Prostate problems: No  Sexually active: No  Precipitating or alleviating factors: None  Therapies tried and outcome: Tamsulosin - not helpful     Goes to bed around 11:30-12:00  Wakes up to urinate every 2-3 hours. No retention, feels like he is emptying his bladder completely.     Review of Systems  Constitutional, HEENT, cardiovascular, pulmonary, gi and gu systems are negative, except as otherwise noted.      Objective    /72 (BP Location: Left arm, Patient Position: Sitting, Cuff Size: Adult Regular)   Pulse 91   Temp 97.2  F (36.2  C) (Temporal)   Resp 16   Ht 1.624 m (5' 3.94\")   Wt 60.7 kg (133 lb 12.8 oz)   SpO2 100%   BMI 23.01 kg/m    Body mass index is 23.01 kg/m .    Physical Exam   {Brief/partially selected " :143509}      Signed Electronically by: TONI Flowers CNP

## 2025-03-27 ENCOUNTER — TELEPHONE (OUTPATIENT)
Dept: SURGERY | Facility: CLINIC | Age: 55
End: 2025-03-27
Payer: COMMERCIAL

## 2025-03-27 NOTE — TELEPHONE ENCOUNTER
This writer received a call from patient stating that he would like to reschedule his canceled Clinic Procedure with Gosia Pendleton NP. He states that he was told that there would be active orders if he decided to reschedule.    This writer explained that clinic procedures are scheduled by the Surgery Clinic Coordinators, and transferred the call to their line at [phone].    This writer does not see active orders, so a message was also sent to the Clinic Coordinators, Mitch Pendleton NP, requesting that if there are no active orders, then to please let us know so that new orders may be entered, if appropriate.    Karla goldstein on 3/27/2025 at 9:24 AM

## 2025-04-17 ENCOUNTER — OFFICE VISIT (OUTPATIENT)
Dept: UROLOGY | Facility: CLINIC | Age: 55
End: 2025-04-17
Payer: COMMERCIAL

## 2025-04-17 VITALS
SYSTOLIC BLOOD PRESSURE: 133 MMHG | HEART RATE: 98 BPM | DIASTOLIC BLOOD PRESSURE: 81 MMHG | WEIGHT: 127 LBS | BODY MASS INDEX: 21.84 KG/M2

## 2025-04-17 DIAGNOSIS — N40.1 BENIGN PROSTATIC HYPERPLASIA WITH NOCTURIA: Primary | ICD-10-CM

## 2025-04-17 DIAGNOSIS — N32.81 OAB (OVERACTIVE BLADDER): ICD-10-CM

## 2025-04-17 DIAGNOSIS — R35.1 BENIGN PROSTATIC HYPERPLASIA WITH NOCTURIA: Primary | ICD-10-CM

## 2025-04-17 RX ORDER — MIRABEGRON 50 MG/1
50 TABLET, FILM COATED, EXTENDED RELEASE ORAL DAILY
Qty: 30 TABLET | Refills: 1 | Status: SHIPPED | OUTPATIENT
Start: 2025-04-17

## 2025-04-17 NOTE — Clinical Note
Thank you for the referral! I have enjoyed participating in the medical care of this very pleasant patient.  Please don't hesitate to contact me with any questions or concerns.  TONI Lincoln  [phone]

## 2025-04-17 NOTE — PROGRESS NOTES
S: Martin Salvador is a pleasant  55 year old male who was requested to be seen by  Mercedes Sharpe for a consult with regard to patient's urinary complaints.  Patient complains of Sensation of incomplete emptying, Nocturia x 2, and Frequency.    Lab Results   Component Value Date    PSA 1.43 04/15/2024    PSA 1.52 03/17/2023    PSA 1.32 12/31/2019    PSA 1.66 07/18/2014         Symptoms have been on going for   ew year years(s).  Seems to be worsened over time. He has been on Tamsulosin 0.4 mg q day for several years and was recently started on Myrbetriq with out much change.     Fluids:   Water 20, Coffe- 16oz Liquor shot at night, Tea- 1     His AUA Symptom Score:  14.  His QOL score:  5.    Current Outpatient Medications   Medication Sig Dispense Refill    aspirin (ASA) 81 MG EC tablet Take 1 tablet (81 mg) by mouth daily 90 tablet 3    atorvastatin (LIPITOR) 20 MG tablet TAKE 1 TABLET(20 MG) BY MOUTH DAILY 90 tablet 0    hydrocortisone, Perianal, (ANUSOL-HC) 2.5 % cream PLACE RECTALLY TWICE DAILY AS NEEDED FOR HEMORRHOIDS 30 g 0    metFORMIN (GLUCOPHAGE) 500 MG tablet Take 1 tablet (500 mg) by mouth 2 times daily (with meals). 180 tablet 3    mirabegron (MYRBETRIQ) 25 MG 24 hr tablet Take 1 tablet (25 mg) by mouth daily. 30 tablet 2    pantoprazole (PROTONIX) 40 MG EC tablet Take 1 tablet (40 mg) by mouth daily. 90 tablet 1    tamsulosin (FLOMAX) 0.4 MG capsule Take 1 capsule (0.4 mg) by mouth daily. 90 capsule 1     No Known Allergies  Past Medical History:   Diagnosis Date    External hemorrhoids     Hemorrhoids, internal     Paraseptal emphysema (H) 1/26/2016    Tobacco dependence      Past Surgical History:   Procedure Laterality Date    COLONOSCOPY N/A 4/11/2024    Procedure: COLONOSCOPY, WITH POLYPECTOMY AND BIOPSY;  Surgeon: Leidy Multani DO;  Location: [facility] OR    COLONOSCOPY WITH CO2 INSUFFLATION N/A 4/11/2024    Procedure: Colonoscopy with CO2 insufflation;  Surgeon: Leidy Multani DO;  Location:  [facility] OR    COMBINED REPAIR PTOSIS WITH BLEPHAROPLASTY BILATERAL Bilateral 6/10/2024    Procedure: Both upper eyelid blepharoplasty and ptosis repair;  Surgeon: Savannah Henderson MD;  Location: [facility] OR    LIGATION OF HEMORRHOID(S)        Family History   Problem Relation Age of Onset    Diabetes Mother      He does not have a family history of prostate cancer.  Social History     Socioeconomic History    Marital status:      Spouse name: Lou    Number of children: 2   Occupational History    Occupation:      Employer: OTHER   Tobacco Use    Smoking status: Former     Current packs/day: 0.00     Average packs/day: 1 pack/day for 32.0 years (32.0 ttl pk-yrs)     Types: Cigarettes     Start date: 1984     Quit date: 2016     Years since quittin.2     Passive exposure: Never    Smokeless tobacco: Never   Vaping Use    Vaping status: Never Used   Substance and Sexual Activity    Alcohol use: Yes    Drug use: No    Sexual activity: Not Currently     Partners: Female     Social Drivers of Health     Financial Resource Strain: Low Risk  (2024)    Financial Resource Strain     Within the past 12 months, have you or your family members you live with been unable to get utilities (heat, electricity) when it was really needed?: No   Food Insecurity: Low Risk  (2024)    Food Insecurity     Within the past 12 months, did you worry that your food would run out before you got money to buy more?: No     Within the past 12 months, did the food you bought just not last and you didn t have money to get more?: No   Transportation Needs: Low Risk  (2024)    Transportation Needs     Within the past 12 months, has lack of transportation kept you from medical appointments, getting your medicines, non-medical meetings or appointments, work, or from getting things that you need?: No   Physical Activity: Not on File (3/19/2021)    Received from [facility]    Physical Activity     Physical Activity: 0    Stress: Not on File (3/19/2021)    Received from [facility]    Stress     Stress: 0   Social Connections: Not on File (3/19/2021)    Received from [facility]    Social Connections     Social Connections and Isolation: 0   Interpersonal Safety: Low Risk  (6/5/2024)    Interpersonal Safety     Do you feel physically and emotionally safe where you currently live?: Yes     Within the past 12 months, have you been hit, slapped, kicked or otherwise physically hurt by someone?: No     Within the past 12 months, have you been humiliated or emotionally abused in other ways by your partner or ex-partner?: No   Housing Stability: High Risk (1/29/2024)    Housing Stability     Do you have housing? : No     Are you worried about losing your housing?: No        REVIEW OF SYSTEMS  =================  C: NEGATIVE for fever, chills, change in weight  I: NEGATIVE for worrisome rashes, moles or lesions  E/M: NEGATIVE for ear, mouth and throat problems  R: NEGATIVE for significant cough or SHORTNESS OF BREATH  CV:  NEGATIVE for chest pain, palpitations or peripheral edema  GI: NEGATIVE for nausea, abdominal pain, heartburn, or change in bowel habits  NEURO: NEGATIVE numbness/weakness  : see HPI  PSYCH: NEGATIVE depression/anxiety  LYmph: no new enlarged lymph nodes  Ortho: no new trauma/movements           O: Exam:/81   Pulse 98   Wt 57.6 kg (127 lb)   BMI 21.84 kg/m     Constitutional: healthy, alert and no distress  Cardiovascular: negative, PMI normal.   Respiratory: negative, no evidence of respiratory distress  Gastrointestinal: Abdomen soft, non-tender. BS normal. No masses, organomegaly  :      Normal penis, no penile plaques or lesions.      Orthotopic location of the urethral meatus.     Scrotum normal.  NIKKIE:     Normal rectal tone, no amount of stool in the rectal vault.     30 g  sized prostate, No tenderness, mass or asymmetry.  Musculoskeletal: extremities normal- no gross deformities noted, gait normal  and normal muscle tone  Skin: no suspicious lesions or rashes  Neurologic: Alert and oriented  Psychiatric: mentation appears normal. and affect normal/brights  PVR: 5 ml    Assessment/Plan:   1. Benign prostatic hyperplasia with nocturia (Primary)  2. OAB (overactive bladder)  It was my pleasure to meet with Mr. Salvador in clinic today to discuss his lower urinary tract symptoms. I explained that his lower urinary tract symptoms are likely secondary to benign prostatic hyperplasia (BPH). Can not rule out OAB as a contributing/causative factor. We reviewed the natural history of BPH, its prevalence, and management options. We noted that progression of BPH and associated symptoms is unpredictable and many individuals will have stable lower urinary tract symptoms for years, while others may note worsening of their symptoms with time. We discussed that, in general, treatment of BPH is based on the extent of bother caused by lower urinary tract symptoms. We then reviewed options for ongoing management including observation/watchful waiting, lifestyle modifications, medical management, office based minimally invasive procedures (Rezum), transurethral resection of the prostate (TURP).     Following our discussion, Mr. Salvador expressed an interest in proceeding with:   - Increasing Myrbetriq 50 mg po q day   - Decreasing alcohol at night   - Return to clinic for Cystoscopy with Dr. Chowdary     Signed by:  TONI Lincoln CNP

## 2025-04-17 NOTE — PATIENT INSTRUCTIONS
"Mike Salvador, it was nice to meet you!    Thank you for allowing us the privilege of caring for you. We hope we provided you with the excellent service you deserve.   Please let us know if there is anything else we can do for you.  We want you to be completely satisfied with your care experience.    To ensure the quality of our services, you may be receiving a patient satisfaction survey from an independent patient satisfaction monitoring company.    The greatest compliment you can give is a \"Likely to Recommend.\"    Your visit was with TONI Lincoln CNP today.    Instructions per today's visit:     Increase Myrbetriq to 50 mg every day   Return to clinic to have a cystoscopy, look in the bladder with a scope     ___________________________________________________________________________  Important contact and scheduling information:  Please call our contact center at [phone] to schedule your next appointments or to reach our nurse triage line.  Please call during clinic hours Monday through Friday 8:00a - 4:30p if you have questions.  You can contact us anytime via [employer] Information and we will reply during clinic hours.    Lab results will be communicated through My Chart or letter (if My Chart not used). Please call the clinic if you have not received communication after 1 week or if you have any questions.?  __________________________________________________________________________    If labs were ordered today:    Please make an appointment to have them drawn at your convenience.     To schedule the Lab Appointment using [employer]:  Select \"Schedule an Appointment\"  Select \"Lab Only\"  Answer simple questions about where you would like to be seen and your type of insurance  For \"Which locations work for you?, select the location and set up the appointment.      "

## 2025-05-13 ENCOUNTER — TRANSFERRED RECORDS (OUTPATIENT)
Dept: MULTI SPECIALTY CLINIC | Facility: CLINIC | Age: 55
End: 2025-05-13

## 2025-05-13 LAB — RETINOPATHY: NORMAL

## 2025-05-20 ENCOUNTER — OFFICE VISIT (OUTPATIENT)
Dept: UROLOGY | Facility: CLINIC | Age: 55
End: 2025-05-20
Payer: COMMERCIAL

## 2025-05-20 VITALS
BODY MASS INDEX: 22.53 KG/M2 | DIASTOLIC BLOOD PRESSURE: 78 MMHG | OXYGEN SATURATION: 100 % | WEIGHT: 131 LBS | SYSTOLIC BLOOD PRESSURE: 135 MMHG

## 2025-05-20 DIAGNOSIS — R35.1 BENIGN PROSTATIC HYPERPLASIA WITH NOCTURIA: Primary | ICD-10-CM

## 2025-05-20 DIAGNOSIS — N40.1 BENIGN PROSTATIC HYPERPLASIA WITH NOCTURIA: Primary | ICD-10-CM

## 2025-05-20 PROCEDURE — 3078F DIAST BP <80 MM HG: CPT | Performed by: UROLOGY

## 2025-05-20 PROCEDURE — 52000 CYSTOURETHROSCOPY: CPT | Performed by: UROLOGY

## 2025-05-20 PROCEDURE — 99213 OFFICE O/P EST LOW 20 MIN: CPT | Mod: 25 | Performed by: UROLOGY

## 2025-05-20 PROCEDURE — 3075F SYST BP GE 130 - 139MM HG: CPT | Performed by: UROLOGY

## 2025-05-20 PROCEDURE — 51798 US URINE CAPACITY MEASURE: CPT | Performed by: UROLOGY

## 2025-05-20 RX ORDER — FINASTERIDE 5 MG/1
5 TABLET, FILM COATED ORAL DAILY
Qty: 90 TABLET | Refills: 3 | Status: SHIPPED | OUTPATIENT
Start: 2025-05-20

## 2025-05-20 NOTE — PATIENT INSTRUCTIONS
Tony?mTM Water Vapor Therapy Patient  Instructions Pre Treatment    What is the Rezum procedure?   Rezum is a procedure to treat benign prostatic hyperplasia (BPH) that can be performed in a clinic or outpatient setting. Rezum uses targeted, controlled doses of thermal energy in water vapor (steam) to treat the extra prostate tissue that is causing symptoms such as frequency, urgency, irregular flow, weak stream, straining to urinate and frequent nighttime urination.     **To learn more about REZUM therapy and how it can safely and effectively reduce your BPH symptoms, visit: [email or web address]    What happens on the day of the Rezum procedure?   On the day of the procedure, a light meal and lots of fluids are recommended. The procedure itself is quick and simple, taking only a single office visit. Depending on the size of the prostate, the procedure consists of two to seven injections, each nine seconds long. Upon arriving, you will be instructed to eliminate urine, and the bladder is then checked. Both the prostate and the penis are numbed. The Tony?m device is inserted, a needle is deployed, and vapor is injected into the prostate for nine seconds. This vapor disperses between cells, then cools, releases heat, and gently disrupts the prostate's cells. Because of the initial swelling, a catheter is then inserted, which will remain for 7-10 days.    Rezum appointment information  The Rezum procedure with Dr. Chowdary is scheduled on  at  in [place].     Start the antibiotics THE DAY BEFORE THE PROCEDURE. Continue taking until they are gone.    2. Start the IBUPROFEN THE EVENING OF THE PROCEDURE. Take 800mg three times per day for 14 days.    3. Purchase a Fleets  enema at your local pharmacy and administer this 1 to 2 hours prior to your scheduled appointment time.    4. You may possibly need to leave a urine specimen when you arrive at the office.    5. Please follow these instructions for holding blood  thinning medications:    Vitamin E, fish oil, aspirin, baby aspirin --7 days prior    Plavix, Brilinta, ibuprofen (Advil, Motrin, Aleve) - 7 days prior    Pradaxa - 5 days prior    Coumadin/warfarin - Consult your prescribing provider before holding.    Eliquis, Xarelto - Consult your prescribing provider before holding.    6. You may drive yourself to and from the procedure.      POST REZUM APPOINTMENTS    RN for agarwal catheter removal- 7-10 days after your procedure    Dr. Chowdary- 3 month follow up appointment    How long will the procedure take?  The Rezum procedure will take about 30 minutes. Please allow 1-2 hours for the appointment (including pre and post).    If you have any questions call [facility] directly at [phone].        Tony?mTM Water Vapor Therapy Patient  Instructions Post Catheter removal     What are some of the short term side effects that may occur with Rezum?  As a minimally invasive procedure, Rezum has demonstrated fewer side effects compared to those typically seen with surgical therapies, but as with any interventional procedure, some of the following side effects may temporarily occur:    .  Painful urination  .  Blood in urine  .  Blood in semen  .  Urgency and frequency of urination  .  Inability to urinate or completely empty the bladder  .  Need for recurrent short-term catheterization    Most of these resolve within months of the procedure, but there is a possibility some of these effects may be prolonged.  Please talk with your physician about ways to potentially minimize the effects of these risks.  Patients have found that the following options may help relieve discomfort during the short term healing process:    .  Take a mild pain medication such as Acetaminophen (Tylenol) or Ibuprofen (as directed) (ie: Ibuprofen 800 mg three times a day for 14 days, Acetaminophen 1000mg every 8 hours)  .  Try a warm bath or sitting on a hot water bottle  .  Eliminate bladder irritants such as  caffeine, chocolate, and alcohol from your diet    SYMPTOMS THAT MAY BE SERIOUS. YOU SHOULD CONTACT YOUR DOCTOR IMMEDIATELY IF ANY OF THE FOLLOWING OCCUR:    Fever or chills.  If your oral temperature is greater than or equal to 101F, this may indicate that you have an infection that needs to be evaluated quickly.  Call your doctor.    Inability to urinate.  This may indicate that swelling or a blood clot is blocking the urinary passage.  If not treated this can become more and more painful.  If you cannot urinate, do not drink any more fluids.  Call your urologist during business hours. If it is after hours, proceed to the nearest Emergency Room.    Severe pain.  While there may be some pain related to the procedure, it is usually not severe.  If you are experiencing severe pain or any condition you think may be serious, call your doctor.    The number to call if you are having a problem is [phone].     CALL 911 IN CASE OF EMERGENCY.      General Recommendations, Common Symptoms, FAQ  General Recommendations    1. Take medications as prescribed by your doctor. If Ibuprofen was prescribed for you, please take this medication as prescribed regardless if pain is present or not. The anti-inflammatory benefit minimizes inflammation around the procedure site.     Common Treatment-Related Symptoms  The following are common treatment-related signs and symptoms that you may experience after the procedure. They may also occur following removal of the catheter.    1. Blood in the Urine. It is common to see some blood in the urine for a few weeks. There may also be some small clots or particles in the urine. Drink extra water to flush the  bladder but you do not need to drink excessively or be alarmed. If you think the bleeding is excessive or you are having trouble urinating after the catheter is removed, call your doctor.    2. Painful Urination. It is common to have some pain or burning with urination for several  weeks. It should gradually improve. If it persists or starts to worsen, call your doctor.    3. Slow Urinary Stream. After the procedure, swelling of the prostate occurs which may or may not make the urinary stream weaker. This should gradually improve. If you are having a lot of difficulty trying to urinate or cannot urinate, call your doctor.    4. Urinary Urgency, Frequency or Leakage. You may experience a strong urge to urinate while the prostate heals. Sometimes the urge is so strong that it is difficult to hold your urine and leakage occurs. If this occurs, it is usually only a small amount and temporary.    5. Urinary Symptoms. After the procedure, it is expected that your general urinary symptoms may temporarily worsen due to localized swelling and then gradually improve.    6. Blood in the Semen. This may occur intermittently up to several weeks following the procedure. The semen may have red or a  samm  color. This condition will generally resolve on its own without further treatment.    Frequently Asked Questions    When can I resume normal physical activities?  You may resume your daily activities immediately. There are no specific limitations if you work out in a gym, but you should probably limit vigorous workouts such as bike riding, running, treadmills, and heavy weight lifting for the first week. If you experience any blood in your urine, limit your activities and increase your water intake.    When can I resume sexual activity?  In general, once you do not see any blood in the urine you may resume sexual activity. You may experience blood in the semen as described above.    When can I resume my medications including blood thinners?  In general, you should continue with any of your regular medicines; however, check with your doctor particularly if you are a diabetic. You should continue to use your BPH medications until you are advised to stop.    When can I expect to see results?  In general,  improved voiding results may take 4-12 weeks before being noticed depending on prostate size and number of treatments.

## 2025-05-20 NOTE — PROGRESS NOTES
S: Martin Salvador is a pleasant  55 year old male who was seen for a consult with regard to patient's urinary complaints.  Patient complains of Nocturia x 2-3 and weak stream.  He has no history of elevated PSA.  Symptoms have been on going for   several years(s).  Seems to be worsened over time.  His recent PSA was found to be   PSA   Date Value Ref Range Status   12/31/2019 1.32 0 - 4 ug/L Final     Comment:     Assay Method:  Chemiluminescence using Siemens Vista analyzer     Prostate Specific Antigen Screen   Date Value Ref Range Status   04/15/2024 1.43 0.00 - 3.50 ng/mL Final   03/17/2023 1.52 0.00 - 4.00 ug/L Final   .  He is on flomax/myrbetriq.    Current Outpatient Medications   Medication Sig Dispense Refill    aspirin (ASA) 81 MG EC tablet Take 1 tablet (81 mg) by mouth daily 90 tablet 3    atorvastatin (LIPITOR) 20 MG tablet TAKE 1 TABLET(20 MG) BY MOUTH DAILY 90 tablet 0    finasteride (PROSCAR) 5 MG tablet Take 1 tablet (5 mg) by mouth daily. 90 tablet 3    hydrocortisone, Perianal, (ANUSOL-HC) 2.5 % cream PLACE RECTALLY TWICE DAILY AS NEEDED FOR HEMORRHOIDS 30 g 0    metFORMIN (GLUCOPHAGE) 500 MG tablet Take 1 tablet (500 mg) by mouth 2 times daily (with meals). 180 tablet 3    mirabegron (MYRBETRIQ) 50 MG 24 hr tablet Take 1 tablet (50 mg) by mouth daily. 30 tablet 1    pantoprazole (PROTONIX) 40 MG EC tablet Take 1 tablet (40 mg) by mouth daily. 90 tablet 1    tamsulosin (FLOMAX) 0.4 MG capsule Take 1 capsule (0.4 mg) by mouth daily. 90 capsule 1     No Known Allergies  Past Medical History:   Diagnosis Date    External hemorrhoids     Hemorrhoids, internal     Paraseptal emphysema (H) 1/26/2016    Tobacco dependence      Past Surgical History:   Procedure Laterality Date    COLONOSCOPY N/A 4/11/2024    Procedure: COLONOSCOPY, WITH POLYPECTOMY AND BIOPSY;  Surgeon: Leidy Multani DO;  Location: [facility] OR    COLONOSCOPY WITH CO2 INSUFFLATION N/A 4/11/2024    Procedure: Colonoscopy with CO2  insufflation;  Surgeon: Leidy Multani DO;  Location: [facility] OR    COMBINED REPAIR PTOSIS WITH BLEPHAROPLASTY BILATERAL Bilateral 6/10/2024    Procedure: Both upper eyelid blepharoplasty and ptosis repair;  Surgeon: Savannah Henderson MD;  Location: [facility] OR    LIGATION OF HEMORRHOID(S)        Family History   Problem Relation Age of Onset    Diabetes Mother      He does not have a family history of prostate cancer.  Social History     Socioeconomic History    Marital status:      Spouse name: Lou    Number of children: 2    Years of education: None    Highest education level: None   Occupational History    Occupation:      Employer: OTHER   Tobacco Use    Smoking status: Former     Current packs/day: 0.00     Average packs/day: 1 pack/day for 32.0 years (32.0 ttl pk-yrs)     Types: Cigarettes     Start date: 1984     Quit date: 2016     Years since quittin.3     Passive exposure: Never    Smokeless tobacco: Never   Vaping Use    Vaping status: Never Used   Substance and Sexual Activity    Alcohol use: Yes    Drug use: No    Sexual activity: Not Currently     Partners: Female     Social Drivers of Health     Financial Resource Strain: Low Risk  (2024)    Financial Resource Strain     Within the past 12 months, have you or your family members you live with been unable to get utilities (heat, electricity) when it was really needed?: No   Food Insecurity: Low Risk  (2024)    Food Insecurity     Within the past 12 months, did you worry that your food would run out before you got money to buy more?: No     Within the past 12 months, did the food you bought just not last and you didn t have money to get more?: No   Transportation Needs: Low Risk  (2024)    Transportation Needs     Within the past 12 months, has lack of transportation kept you from medical appointments, getting your medicines, non-medical meetings or appointments, work, or from getting things that you need?: No    Physical Activity: Not on File (3/19/2021)    Received from [employer]    Physical Activity     Physical Activity: 0   Stress: Not on File (3/19/2021)    Received from [employer]    Stress     Stress: 0   Social Connections: Not on File (3/19/2021)    Received from [employer]    Social Connections     Social Connections and Isolation: 0   Interpersonal Safety: Low Risk  (6/5/2024)    Interpersonal Safety     Do you feel physically and emotionally safe where you currently live?: Yes     Within the past 12 months, have you been hit, slapped, kicked or otherwise physically hurt by someone?: No     Within the past 12 months, have you been humiliated or emotionally abused in other ways by your partner or ex-partner?: No   Housing Stability: High Risk (1/29/2024)    Housing Stability     Do you have housing? : No     Are you worried about losing your housing?: No        REVIEW OF SYSTEMS  =================  C: NEGATIVE for fever, chills, change in weight  I: NEGATIVE for worrisome rashes, moles or lesions  E/M: NEGATIVE for ear, mouth and throat problems  R: NEGATIVE for significant cough or SHORTNESS OF BREATH  CV:  NEGATIVE for chest pain, palpitations or peripheral edema  GI: NEGATIVE for nausea, abdominal pain, heartburn, or change in bowel habits  NEURO: NEGATIVE numbness/weakness  : see HPI  PSYCH: NEGATIVE depression/anxiety  LYmph: no new enlarged lymph nodes  Ortho: no new trauma/movements           O: Exam:/78   Wt 59.4 kg (131 lb)   SpO2 100%   BMI 22.53 kg/m     Constitutional: healthy, alert and no distress  Cardiovascular: negative, PMI normal.   Respiratory: negative, no evidence of respiratory distress  Gastrointestinal: Abdomen soft, non-tender. BS normal. No masses, organomegaly  : penis no discharge.  Testis no masses.    Musculoskeletal: extremities normal- no gross deformities noted, gait normal and normal muscle tone  Skin: no suspicious lesions or rashes  Neurologic: Alert and oriented  Psychiatric:  mentation appears normal. and affect normal/bright  Hematologic/Lymphatic/Immunologic: normal ant/post cervical, axillary, supraclavicular and inguinal nodes    Cysto done today    Patient is draped and prepped.  Flexible cystoscopy placed under direct vision.      The anterior urethra is normal   The prostatic urethra showed trilobar enlargement.     The length is 1cm,  the coaptation is 1 cm.  Small median lobe   In the bladder there is trabeculation grade 1.    Assessment/Plan:  (N40.1,  R35.1) Benign prostatic hyperplasia with nocturia  (primary encounter diagnosis)  Comment:    Plan: CYSTOURETHROSCOPY (72964), MEASURE POST-VOID         RESIDUAL URINE/BLADDER CAPACITY, US NON-IMAGING        (77865), finasteride (PROSCAR) 5 MG tablet             Recommendations:   Medical management versus surgical management versus office treatments, were discussed with patient at length. Pros and Cons discussed.    Cont with flomax/myrbetriq.  Add proscar.

## 2025-05-22 ENCOUNTER — OFFICE VISIT (OUTPATIENT)
Dept: SURGERY | Facility: CLINIC | Age: 55
End: 2025-05-22
Payer: COMMERCIAL

## 2025-05-22 VITALS
HEART RATE: 95 BPM | OXYGEN SATURATION: 99 % | DIASTOLIC BLOOD PRESSURE: 69 MMHG | SYSTOLIC BLOOD PRESSURE: 127 MMHG | HEIGHT: 64 IN | BODY MASS INDEX: 22.42 KG/M2 | WEIGHT: 131.3 LBS

## 2025-05-22 DIAGNOSIS — K64.8 HEMORRHOID PROLAPSE: Primary | ICD-10-CM

## 2025-05-22 ASSESSMENT — PAIN SCALES - GENERAL: PAINLEVEL_OUTOF10: NO PAIN (0)

## 2025-05-22 NOTE — NURSING NOTE
"Chief Complaint   Patient presents with    Hemorrhoids       Vitals:    05/22/25 1058   BP: 127/69   BP Location: Left arm   Patient Position: Sitting   Cuff Size: Adult Regular   Pulse: 95   SpO2: 99%   Weight: 131 lb 4.8 oz   Height: 5' 4\"       Body mass index is 22.54 kg/m .    Deepa Olson, EMT  "

## 2025-05-22 NOTE — PROGRESS NOTES
"Colon and Rectal Surgery Consult Clinic Note     Referring provider:  Referred Self, MD  No address on file     RE: Martin Salvador  : 1970  AYAN: 2024    Martin Salvador is a very pleasant 54 year old male here for hemorrhoids.    HPI:  I saw Martin in the past with hemorrhoid prolapse. He continues on a daily fiber supplement. Symptoms have improved but still some prolapsing tissue. No bleeding or pain. Is not on any blood thinners.  Colonoscopy 24 was normal.    Physical Examination: Exam was chaperoned by Deepa Olson, EMT   /69 (BP Location: Left arm, Patient Position: Sitting, Cuff Size: Adult Regular)   Pulse 95   Ht 5' 4\"   Wt 131 lb 4.8 oz   SpO2 99%   BMI 22.54 kg/m    General: alert, oriented, in no acute distress, sitting comfortably  HEENT: mucous membranes moist    Perianal external examination:  Perianal skin: Intact with no excoriation or lichenification.  Lesions: No evidence of an external lesion, nodularity, or induration in the perianal region.  Eversion of buttocks: There was not evidence of an anal fissure. Details: N/A.  Skin tags or external hemorrhoids: None.    Digital rectal examination: Was performed.   Sphincter tone: Good.  Palpable lesions: No.  Prostate: Normal.  Other: None.    Anoscopy: Was performed.   Hemorrhoids: Yes. Grade 2-3 internal hemorrhoids without bleeding.  Lesions: No    Procedure:  After discussing the risks and benefits, the patient agreed to proceed with internal hemorrhoidal banding.    Prior to the start of the procedure and with procedural staff participation, I verbally confirmed the patient s identity using two indicators, relevant allergies, that the procedure was appropriate and matched the consent or emergent situation, and that the correct equipment/implants were available. Immediately prior to starting the procedure I conducted the Time Out with the procedural staff and re-confirmed the patient s name, procedure, and site/side. (The " Joint Commission universal protocol was followed.)  Yes    Sedation (Moderate or Deep): None    A suction hemorrhoidal  was used to place a total of 2 band(s) in the right posterior and anterior position(s).    There was no significant bleeding. The patient tolerated the procedure well.    This procedure was performed under a collaborative privileging agreement with Dr. Jase Monterroso, Chief Division of Colon and Rectal Surgery    Assessment/Plan: 54 year old male with hemorrhoid prolapse. Discussed managing these with hemorrhoidal banding. Discussed that several banding procedures may be needed if symptoms persist. Patient states an understanding of this and states an understanding that there is a small risk of bleeding today and when the band falls off in 1-2 weeks. Also advised patient that there is a remote chance of infection. Recommended avoiding heavy lifting and remain off aspirin for two weeks. Patient verbalized an understanding of these risks and wished to proceed today. He tolerated banding well. Will have him follow up anytime after 4 weeks with any persistent symptoms. Patient's questions were answered to his stated satisfaction and he is in agreement with this plan.       Medical history:  Past Medical History:   Diagnosis Date    External hemorrhoids     Hemorrhoids, internal     Paraseptal emphysema (H) 1/26/2016    Tobacco dependence        Surgical history:  Past Surgical History:   Procedure Laterality Date    COLONOSCOPY N/A 4/11/2024    Procedure: COLONOSCOPY, WITH POLYPECTOMY AND BIOPSY;  Surgeon: Leidy Multani DO;  Location: [facility]    COLONOSCOPY WITH CO2 INSUFFLATION N/A 4/11/2024    Procedure: Colonoscopy with CO2 insufflation;  Surgeon: Leidy Multani DO;  Location: [facility]    COMBINED REPAIR PTOSIS WITH BLEPHAROPLASTY BILATERAL Bilateral 6/10/2024    Procedure: Both upper eyelid blepharoplasty and ptosis repair;  Surgeon: Savannah Henderson MD;  Location: [facility] OR    LIGATION OF  HEMORRHOID(S)         Problem list:    Patient Active Problem List    Diagnosis Date Noted    Ptosis of both eyelids 03/13/2024     Priority: Medium    Dermatochalasis of both upper eyelids 03/13/2024     Priority: Medium    Centrilobular emphysema (H) 01/29/2024     Priority: Medium    Diabetes mellitus, type 2 (H) 12/23/2020     Priority: Medium    Hyperlipidemia LDL goal <100 07/25/2018     Priority: Medium    Personal history of tobacco use, presenting hazards to health 01/06/2017     Priority: Medium    Gastroesophageal reflux disease without esophagitis 01/06/2017     Priority: Medium    Bladder calculi 01/27/2016     Priority: Medium    Impaired fasting glucose 01/27/2016     Priority: Medium    BPH (benign prostatic hyperplasia) 01/27/2016     Priority: Medium    Pulmonary nodules 01/26/2016     Priority: Medium    Internal hemorrhoid 07/11/2014     Priority: Medium    External hemorrhoids 03/31/2014     Priority: Medium     Problem list name updated by automated process. Provider to review         Medications:  Current Outpatient Medications   Medication Sig Dispense Refill    aspirin (ASA) 81 MG EC tablet Take 1 tablet (81 mg) by mouth daily 90 tablet 3    atorvastatin (LIPITOR) 20 MG tablet TAKE 1 TABLET(20 MG) BY MOUTH DAILY 90 tablet 0    finasteride (PROSCAR) 5 MG tablet Take 1 tablet (5 mg) by mouth daily. 90 tablet 3    hydrocortisone, Perianal, (ANUSOL-HC) 2.5 % cream PLACE RECTALLY TWICE DAILY AS NEEDED FOR HEMORRHOIDS 30 g 0    metFORMIN (GLUCOPHAGE) 500 MG tablet Take 1 tablet (500 mg) by mouth 2 times daily (with meals). 180 tablet 3    mirabegron (MYRBETRIQ) 50 MG 24 hr tablet Take 1 tablet (50 mg) by mouth daily. 30 tablet 1    pantoprazole (PROTONIX) 40 MG EC tablet Take 1 tablet (40 mg) by mouth daily. 90 tablet 1    tamsulosin (FLOMAX) 0.4 MG capsule Take 1 capsule (0.4 mg) by mouth daily. 90 capsule 1       Allergies:  No Known Allergies    Family history:  Family History   Problem  "Relation Age of Onset    Diabetes Mother        Social history:  Social History     Tobacco Use    Smoking status: Former     Current packs/day: 0.00     Average packs/day: 1 pack/day for 32.0 years (32.0 ttl pk-yrs)     Types: Cigarettes     Start date: 1984     Quit date: 2016     Years since quittin.3     Passive exposure: Never    Smokeless tobacco: Never   Substance Use Topics    Alcohol use: Yes    Marital status: .  Nursing Notes:   Deepa Olson  2025 11:00 AM  Signed  Chief Complaint   Patient presents with    Hemorrhoids       Vitals:    25 1058   BP: 127/69   BP Location: Left arm   Patient Position: Sitting   Cuff Size: Adult Regular   Pulse: 95   SpO2: 99%   Weight: 131 lb 4.8 oz   Height: 5' 4\"       Body mass index is 22.54 kg/m .    DM Alexandra     5 minutes spent on the date of encounter performing chart review, history and exam, documentation and further activities as noted above with an additional 5 minutes for anoscopy and banding.     TONI Posada, NP-C  Colon and Rectal Surgery   [facility]    This note was created using speech recognition software and may contain unintended word substitutions.    "

## 2025-05-22 NOTE — LETTER
"2025       RE: Martin Salvador  [address]  [facility] [address]     Dear Colleague,    Thank you for referring your patient, Martin Salvador, to the [facility] COLON AND RECTAL SURGERY CLINIC [place] at [facility]. Please see a copy of my visit note below.    Colon and Rectal Surgery Consult Clinic Note     Referring provider:  Referred Self, MD  No address on file     RE: Martin Salvador  : 1970  AYAN: 2024    Martin Salvador is a very pleasant 54 year old male here for hemorrhoids.    HPI:  I saw Martin in the past with hemorrhoid prolapse. He continues on a daily fiber supplement. Symptoms have improved but still some prolapsing tissue. No bleeding or pain. Is not on any blood thinners.  Colonoscopy 24 was normal.    Physical Examination: Exam was chaperoned by Deepa Olson EMT   /69 (BP Location: Left arm, Patient Position: Sitting, Cuff Size: Adult Regular)   Pulse 95   Ht 5' 4\"   Wt 131 lb 4.8 oz   SpO2 99%   BMI 22.54 kg/m    General: alert, oriented, in no acute distress, sitting comfortably  HEENT: mucous membranes moist    Perianal external examination:  Perianal skin: Intact with no excoriation or lichenification.  Lesions: No evidence of an external lesion, nodularity, or induration in the perianal region.  Eversion of buttocks: There was not evidence of an anal fissure. Details: N/A.  Skin tags or external hemorrhoids: None.    Digital rectal examination: Was performed.   Sphincter tone: Good.  Palpable lesions: No.  Prostate: Normal.  Other: None.    Anoscopy: Was performed.   Hemorrhoids: Yes. Grade 2-3 internal hemorrhoids without bleeding.  Lesions: No    Procedure:  After discussing the risks and benefits, the patient agreed to proceed with internal hemorrhoidal banding.    Prior to the start of the procedure and with procedural staff participation, I verbally confirmed the patient s identity using two " indicators, relevant allergies, that the procedure was appropriate and matched the consent or emergent situation, and that the correct equipment/implants were available. Immediately prior to starting the procedure I conducted the Time Out with the procedural staff and re-confirmed the patient s name, procedure, and site/side. (The Joint Commission universal protocol was followed.)  Yes    Sedation (Moderate or Deep): None    A suction hemorrhoidal  was used to place a total of 2 band(s) in the right posterior and anterior position(s).    There was no significant bleeding. The patient tolerated the procedure well.    This procedure was performed under a collaborative privileging agreement with Dr. Jase Monterroso, Chief Division of Colon and Rectal Surgery    Assessment/Plan: 54 year old male with hemorrhoid prolapse. Discussed managing these with hemorrhoidal banding. Discussed that several banding procedures may be needed if symptoms persist. Patient states an understanding of this and states an understanding that there is a small risk of bleeding today and when the band falls off in 1-2 weeks. Also advised patient that there is a remote chance of infection. Recommended avoiding heavy lifting and remain off aspirin for two weeks. Patient verbalized an understanding of these risks and wished to proceed today. He tolerated banding well. Will have him follow up anytime after 4 weeks with any persistent symptoms. Patient's questions were answered to his stated satisfaction and he is in agreement with this plan.       Medical history:  Past Medical History:   Diagnosis Date     External hemorrhoids      Hemorrhoids, internal      Paraseptal emphysema (H) 1/26/2016     Tobacco dependence        Surgical history:  Past Surgical History:   Procedure Laterality Date     COLONOSCOPY N/A 4/11/2024    Procedure: COLONOSCOPY, WITH POLYPECTOMY AND BIOPSY;  Surgeon: Leidy Multani DO;  Location:  OR     COLONOSCOPY  WITH CO2 INSUFFLATION N/A 4/11/2024    Procedure: Colonoscopy with CO2 insufflation;  Surgeon: Leidy Multani DO;  Location: [facility] OR     COMBINED REPAIR PTOSIS WITH BLEPHAROPLASTY BILATERAL Bilateral 6/10/2024    Procedure: Both upper eyelid blepharoplasty and ptosis repair;  Surgeon: Savannah Henderson MD;  Location: [facility] OR     LIGATION OF HEMORRHOID(S)         Problem list:    Patient Active Problem List    Diagnosis Date Noted     Ptosis of both eyelids 03/13/2024     Priority: Medium     Dermatochalasis of both upper eyelids 03/13/2024     Priority: Medium     Centrilobular emphysema (H) 01/29/2024     Priority: Medium     Diabetes mellitus, type 2 (H) 12/23/2020     Priority: Medium     Hyperlipidemia LDL goal <100 07/25/2018     Priority: Medium     Personal history of tobacco use, presenting hazards to health 01/06/2017     Priority: Medium     Gastroesophageal reflux disease without esophagitis 01/06/2017     Priority: Medium     Bladder calculi 01/27/2016     Priority: Medium     Impaired fasting glucose 01/27/2016     Priority: Medium     BPH (benign prostatic hyperplasia) 01/27/2016     Priority: Medium     Pulmonary nodules 01/26/2016     Priority: Medium     Internal hemorrhoid 07/11/2014     Priority: Medium     External hemorrhoids 03/31/2014     Priority: Medium     Problem list name updated by automated process. Provider to review         Medications:  Current Outpatient Medications   Medication Sig Dispense Refill     aspirin (ASA) 81 MG EC tablet Take 1 tablet (81 mg) by mouth daily 90 tablet 3     atorvastatin (LIPITOR) 20 MG tablet TAKE 1 TABLET(20 MG) BY MOUTH DAILY 90 tablet 0     finasteride (PROSCAR) 5 MG tablet Take 1 tablet (5 mg) by mouth daily. 90 tablet 3     hydrocortisone, Perianal, (ANUSOL-HC) 2.5 % cream PLACE RECTALLY TWICE DAILY AS NEEDED FOR HEMORRHOIDS 30 g 0     metFORMIN (GLUCOPHAGE) 500 MG tablet Take 1 tablet (500 mg) by mouth 2 times daily (with meals). 180 tablet 3      "mirabegron (MYRBETRIQ) 50 MG 24 hr tablet Take 1 tablet (50 mg) by mouth daily. 30 tablet 1     pantoprazole (PROTONIX) 40 MG EC tablet Take 1 tablet (40 mg) by mouth daily. 90 tablet 1     tamsulosin (FLOMAX) 0.4 MG capsule Take 1 capsule (0.4 mg) by mouth daily. 90 capsule 1       Allergies:  No Known Allergies    Family history:  Family History   Problem Relation Age of Onset     Diabetes Mother        Social history:  Social History     Tobacco Use     Smoking status: Former     Current packs/day: 0.00     Average packs/day: 1 pack/day for 32.0 years (32.0 ttl pk-yrs)     Types: Cigarettes     Start date: 1984     Quit date: 2016     Years since quittin.3     Passive exposure: Never     Smokeless tobacco: Never   Substance Use Topics     Alcohol use: Yes    Marital status: .  Nursing Notes:   Deepa Olson  2025 11:00 AM  Signed  Chief Complaint   Patient presents with     Hemorrhoids       Vitals:    25 1058   BP: 127/69   BP Location: Left arm   Patient Position: Sitting   Cuff Size: Adult Regular   Pulse: 95   SpO2: 99%   Weight: 131 lb 4.8 oz   Height: 5' 4\"       Body mass index is 22.54 kg/m .    DM Alexandra     5 minutes spent on the date of encounter performing chart review, history and exam, documentation and further activities as noted above with an additional 5 minutes for anoscopy and banding.     TONI Portillo, NP-C  Colon and Rectal Surgery   [facility]    This note was created using speech recognition software and may contain unintended word substitutions.      Again, thank you for allowing me to participate in the care of your patient.      Sincerely,    TONI Portillo CNP    "

## 2025-06-17 DIAGNOSIS — N40.1 BENIGN PROSTATIC HYPERPLASIA WITH NOCTURIA: ICD-10-CM

## 2025-06-17 DIAGNOSIS — N32.81 OAB (OVERACTIVE BLADDER): ICD-10-CM

## 2025-06-17 DIAGNOSIS — R35.1 BENIGN PROSTATIC HYPERPLASIA WITH NOCTURIA: ICD-10-CM

## 2025-06-17 RX ORDER — MIRABEGRON 50 MG/1
50 TABLET, FILM COATED, EXTENDED RELEASE ORAL DAILY
Qty: 30 TABLET | Refills: 1 | Status: SHIPPED | OUTPATIENT
Start: 2025-06-17

## 2025-06-23 ENCOUNTER — OFFICE VISIT (OUTPATIENT)
Dept: SURGERY | Facility: CLINIC | Age: 55
End: 2025-06-23
Payer: COMMERCIAL

## 2025-06-23 VITALS
SYSTOLIC BLOOD PRESSURE: 116 MMHG | HEIGHT: 64 IN | DIASTOLIC BLOOD PRESSURE: 66 MMHG | WEIGHT: 128.8 LBS | HEART RATE: 77 BPM | OXYGEN SATURATION: 100 % | BODY MASS INDEX: 21.99 KG/M2

## 2025-06-23 DIAGNOSIS — K64.8 HEMORRHOID PROLAPSE: Primary | ICD-10-CM

## 2025-06-23 PROCEDURE — 1126F AMNT PAIN NOTED NONE PRSNT: CPT | Performed by: NURSE PRACTITIONER

## 2025-06-23 PROCEDURE — 3074F SYST BP LT 130 MM HG: CPT | Performed by: NURSE PRACTITIONER

## 2025-06-23 PROCEDURE — 46221 LIGATION OF HEMORRHOID(S): CPT | Performed by: NURSE PRACTITIONER

## 2025-06-23 PROCEDURE — 3078F DIAST BP <80 MM HG: CPT | Performed by: NURSE PRACTITIONER

## 2025-06-23 PROCEDURE — 99213 OFFICE O/P EST LOW 20 MIN: CPT | Mod: 25 | Performed by: NURSE PRACTITIONER

## 2025-06-23 ASSESSMENT — PAIN SCALES - GENERAL: PAINLEVEL_OUTOF10: NO PAIN (0)

## 2025-06-23 NOTE — LETTER
"2025       RE: Martin Salvador  [address]  [facility] [address]     Dear Colleague,    Thank you for referring your patient, Martin Salvador, to the [facility] RECTAL SURGERY CLINIC [place] at [facility]. Please see a copy of my visit note below.    Colon and Rectal Surgery Consult Clinic Note     Referring provider:  Referred Self, MD  No address on file     RE: Martin Salvador  : 1970  AYAN: 2025    Martin Salvador is a very pleasant 55 year old male here for hemorrhoids.    HPI:  I saw Martin in the past with hemorrhoid prolapse. He continues on a daily fiber supplement but not daily. Symptoms have improved but still some prolapsing tissue. No bleeding or pain. Is not on any blood thinners.  Colonoscopy 24 was normal.    Physical Examination: Exam was chaperoned by Deepa Olson EMT   /66 (BP Location: Left arm, Patient Position: Sitting, Cuff Size: Adult Regular)   Pulse 77   Ht 5' 4\"   Wt 128 lb 12.8 oz   SpO2 100%   BMI 22.11 kg/m    General: alert, oriented, in no acute distress, sitting comfortably  HEENT: mucous membranes moist    Perianal external examination:  Perianal skin: Intact with no excoriation or lichenification.  Lesions: No evidence of an external lesion, nodularity, or induration in the perianal region.  Eversion of buttocks: There was not evidence of an anal fissure. Details: N/A.  Skin tags or external hemorrhoids: None.    Digital rectal examination: Was performed.   Sphincter tone: Good.  Palpable lesions: No.  Prostate: Normal.  Other: None.    Anoscopy: Was performed.   Hemorrhoids: Yes. Grade 2-3 internal hemorrhoids without bleeding.  Lesions: No    Procedure:  After discussing the risks and benefits, the patient agreed to proceed with internal hemorrhoidal banding.    Prior to the start of the procedure and with procedural staff participation, I verbally confirmed the patient s identity " using two indicators, relevant allergies, that the procedure was appropriate and matched the consent or emergent situation, and that the correct equipment/implants were available. Immediately prior to starting the procedure I conducted the Time Out with the procedural staff and re-confirmed the patient s name, procedure, and site/side. (The Joint Commission universal protocol was followed.)  Yes    Sedation (Moderate or Deep): None    A suction hemorrhoidal  was used to place a total of 2 band(s) in the left posterior and right anterior positions.    There was no significant bleeding. The patient tolerated the procedure well.    This procedure was performed under a collaborative privileging agreement with Dr. Jase Monterroso, Chief Division of Colon and Rectal Surgery    Assessment/Plan: 55 year old male with hemorrhoid prolapse. Discussed managing these with hemorrhoidal banding. Discussed that several banding procedures may be needed if symptoms persist. Patient states an understanding of this and states an understanding that there is a small risk of bleeding today and when the band falls off in 1-2 weeks. Also advised patient that there is a remote chance of infection. Recommended avoiding heavy lifting and remain off aspirin for two weeks. Patient verbalized an understanding of these risks and wished to proceed today. He tolerated banding well. Will have him follow up anytime after 4 weeks with any persistent symptoms. Increase fiber supplement to daily.  Patient's questions were answered to his stated satisfaction and he is in agreement with this plan.       Medical history:  Past Medical History:   Diagnosis Date     External hemorrhoids      Hemorrhoids, internal      Paraseptal emphysema (H) 1/26/2016     Tobacco dependence        Surgical history:  Past Surgical History:   Procedure Laterality Date     COLONOSCOPY N/A 4/11/2024    Procedure: COLONOSCOPY, WITH POLYPECTOMY AND BIOPSY;  Surgeon:  Leidy Multani DO;  Location: [facility] OR     COLONOSCOPY WITH CO2 INSUFFLATION N/A 4/11/2024    Procedure: Colonoscopy with CO2 insufflation;  Surgeon: Leidy Multani DO;  Location: [facility] OR     COMBINED REPAIR PTOSIS WITH BLEPHAROPLASTY BILATERAL Bilateral 6/10/2024    Procedure: Both upper eyelid blepharoplasty and ptosis repair;  Surgeon: Savannah Henderson MD;  Location: [facility] OR     LIGATION OF HEMORRHOID(S)         Problem list:    Patient Active Problem List    Diagnosis Date Noted     Ptosis of both eyelids 03/13/2024     Priority: Medium     Dermatochalasis of both upper eyelids 03/13/2024     Priority: Medium     Centrilobular emphysema (H) 01/29/2024     Priority: Medium     Diabetes mellitus, type 2 (H) 12/23/2020     Priority: Medium     Hyperlipidemia LDL goal <100 07/25/2018     Priority: Medium     Personal history of tobacco use, presenting hazards to health 01/06/2017     Priority: Medium     Gastroesophageal reflux disease without esophagitis 01/06/2017     Priority: Medium     Bladder calculi 01/27/2016     Priority: Medium     Impaired fasting glucose 01/27/2016     Priority: Medium     BPH (benign prostatic hyperplasia) 01/27/2016     Priority: Medium     Pulmonary nodules 01/26/2016     Priority: Medium     Internal hemorrhoid 07/11/2014     Priority: Medium     External hemorrhoids 03/31/2014     Priority: Medium     Problem list name updated by automated process. Provider to review         Medications:  Current Outpatient Medications   Medication Sig Dispense Refill     aspirin (ASA) 81 MG EC tablet Take 1 tablet (81 mg) by mouth daily 90 tablet 3     atorvastatin (LIPITOR) 20 MG tablet TAKE 1 TABLET(20 MG) BY MOUTH DAILY 90 tablet 0     finasteride (PROSCAR) 5 MG tablet Take 1 tablet (5 mg) by mouth daily. 90 tablet 3     hydrocortisone, Perianal, (ANUSOL-HC) 2.5 % cream PLACE RECTALLY TWICE DAILY AS NEEDED FOR HEMORRHOIDS 30 g 0     metFORMIN (GLUCOPHAGE) 500 MG tablet Take 1 tablet (500 mg) by  "mouth 2 times daily (with meals). 180 tablet 3     mirabegron (MYRBETRIQ) 50 MG 24 hr tablet TAKE 1 TABLET BY MOUTH DAILY 30 tablet 1     pantoprazole (PROTONIX) 40 MG EC tablet Take 1 tablet (40 mg) by mouth daily. 90 tablet 1     tamsulosin (FLOMAX) 0.4 MG capsule Take 1 capsule (0.4 mg) by mouth daily. 90 capsule 1       Allergies:  No Known Allergies    Family history:  Family History   Problem Relation Age of Onset     Diabetes Mother        Social history:  Social History     Tobacco Use     Smoking status: Former     Current packs/day: 0.00     Average packs/day: 1 pack/day for 32.0 years (32.0 ttl pk-yrs)     Types: Cigarettes     Start date: 1984     Quit date: 2016     Years since quittin.4     Passive exposure: Never     Smokeless tobacco: Never   Substance Use Topics     Alcohol use: Yes    Marital status: .  Nursing Notes:   Deepa Olson  2025  8:25 AM  Signed  Chief Complaint   Patient presents with     Follow Up     Hemorrhoids     Banding       Vitals:    25 0822   BP: 116/66   BP Location: Left arm   Patient Position: Sitting   Cuff Size: Adult Regular   Pulse: 77   SpO2: 100%   Weight: 128 lb 12.8 oz   Height: 5' 4\"       Body mass index is 22.11 kg/m .    DM Alexandra     10 minutes spent on the date of encounter performing chart review, history and exam, documentation and further activities as noted above with an additional 5 minutes for anoscopy and banding.     TONI Portillo, NP-C  Colon and Rectal Surgery   [facility]    This note was created using speech recognition software and may contain unintended word substitutions.      Again, thank you for allowing me to participate in the care of your patient.      Sincerely,    TONI Portillo CNP    "

## 2025-06-23 NOTE — PROGRESS NOTES
"Colon and Rectal Surgery Consult Clinic Note     Referring provider:  Referred Self, MD  No address on file     RE: Martin Salvador  : 1970  AYAN: 2025    Martin Salvador is a very pleasant 55 year old male here for hemorrhoids.    HPI:  I saw Martin in the past with hemorrhoid prolapse. He continues on a daily fiber supplement but not daily. Symptoms have improved but still some prolapsing tissue. No bleeding or pain. Is not on any blood thinners.  Colonoscopy 24 was normal.    Physical Examination: Exam was chaperoned by Deepa Olson, EMT   /66 (BP Location: Left arm, Patient Position: Sitting, Cuff Size: Adult Regular)   Pulse 77   Ht 5' 4\"   Wt 128 lb 12.8 oz   SpO2 100%   BMI 22.11 kg/m    General: alert, oriented, in no acute distress, sitting comfortably  HEENT: mucous membranes moist    Perianal external examination:  Perianal skin: Intact with no excoriation or lichenification.  Lesions: No evidence of an external lesion, nodularity, or induration in the perianal region.  Eversion of buttocks: There was not evidence of an anal fissure. Details: N/A.  Skin tags or external hemorrhoids: None.    Digital rectal examination: Was performed.   Sphincter tone: Good.  Palpable lesions: No.  Prostate: Normal.  Other: None.    Anoscopy: Was performed.   Hemorrhoids: Yes. Grade 2-3 internal hemorrhoids without bleeding.  Lesions: No    Procedure:  After discussing the risks and benefits, the patient agreed to proceed with internal hemorrhoidal banding.    Prior to the start of the procedure and with procedural staff participation, I verbally confirmed the patient s identity using two indicators, relevant allergies, that the procedure was appropriate and matched the consent or emergent situation, and that the correct equipment/implants were available. Immediately prior to starting the procedure I conducted the Time Out with the procedural staff and re-confirmed the patient s name, procedure, and " site/side. (The Joint Commission universal protocol was followed.)  Yes    Sedation (Moderate or Deep): None    A suction hemorrhoidal  was used to place a total of 2 band(s) in the left posterior and right anterior positions.    There was no significant bleeding. The patient tolerated the procedure well.    This procedure was performed under a collaborative privileging agreement with Dr. Jase Monterroso, Chief Division of Colon and Rectal Surgery    Assessment/Plan: 55 year old male with hemorrhoid prolapse. Discussed managing these with hemorrhoidal banding. Discussed that several banding procedures may be needed if symptoms persist. Patient states an understanding of this and states an understanding that there is a small risk of bleeding today and when the band falls off in 1-2 weeks. Also advised patient that there is a remote chance of infection. Recommended avoiding heavy lifting and remain off aspirin for two weeks. Patient verbalized an understanding of these risks and wished to proceed today. He tolerated banding well. Will have him follow up anytime after 4 weeks with any persistent symptoms. Increase fiber supplement to daily.  Patient's questions were answered to his stated satisfaction and he is in agreement with this plan.       Medical history:  Past Medical History:   Diagnosis Date    External hemorrhoids     Hemorrhoids, internal     Paraseptal emphysema (H) 1/26/2016    Tobacco dependence        Surgical history:  Past Surgical History:   Procedure Laterality Date    COLONOSCOPY N/A 4/11/2024    Procedure: COLONOSCOPY, WITH POLYPECTOMY AND BIOPSY;  Surgeon: Leidy Multani DO;  Location:  OR    COLONOSCOPY WITH CO2 INSUFFLATION N/A 4/11/2024    Procedure: Colonoscopy with CO2 insufflation;  Surgeon: Leidy Multani DO;  Location: [facility] OR    COMBINED REPAIR PTOSIS WITH BLEPHAROPLASTY BILATERAL Bilateral 6/10/2024    Procedure: Both upper eyelid blepharoplasty and ptosis repair;   Surgeon: Savannah Henderson MD;  Location: [facility] OR    LIGATION OF HEMORRHOID(S)         Problem list:    Patient Active Problem List    Diagnosis Date Noted    Ptosis of both eyelids 03/13/2024     Priority: Medium    Dermatochalasis of both upper eyelids 03/13/2024     Priority: Medium    Centrilobular emphysema (H) 01/29/2024     Priority: Medium    Diabetes mellitus, type 2 (H) 12/23/2020     Priority: Medium    Hyperlipidemia LDL goal <100 07/25/2018     Priority: Medium    Personal history of tobacco use, presenting hazards to health 01/06/2017     Priority: Medium    Gastroesophageal reflux disease without esophagitis 01/06/2017     Priority: Medium    Bladder calculi 01/27/2016     Priority: Medium    Impaired fasting glucose 01/27/2016     Priority: Medium    BPH (benign prostatic hyperplasia) 01/27/2016     Priority: Medium    Pulmonary nodules 01/26/2016     Priority: Medium    Internal hemorrhoid 07/11/2014     Priority: Medium    External hemorrhoids 03/31/2014     Priority: Medium     Problem list name updated by automated process. Provider to review         Medications:  Current Outpatient Medications   Medication Sig Dispense Refill    aspirin (ASA) 81 MG EC tablet Take 1 tablet (81 mg) by mouth daily 90 tablet 3    atorvastatin (LIPITOR) 20 MG tablet TAKE 1 TABLET(20 MG) BY MOUTH DAILY 90 tablet 0    finasteride (PROSCAR) 5 MG tablet Take 1 tablet (5 mg) by mouth daily. 90 tablet 3    hydrocortisone, Perianal, (ANUSOL-HC) 2.5 % cream PLACE RECTALLY TWICE DAILY AS NEEDED FOR HEMORRHOIDS 30 g 0    metFORMIN (GLUCOPHAGE) 500 MG tablet Take 1 tablet (500 mg) by mouth 2 times daily (with meals). 180 tablet 3    mirabegron (MYRBETRIQ) 50 MG 24 hr tablet TAKE 1 TABLET BY MOUTH DAILY 30 tablet 1    pantoprazole (PROTONIX) 40 MG EC tablet Take 1 tablet (40 mg) by mouth daily. 90 tablet 1    tamsulosin (FLOMAX) 0.4 MG capsule Take 1 capsule (0.4 mg) by mouth daily. 90 capsule 1       Allergies:  No Known  "Allergies    Family history:  Family History   Problem Relation Age of Onset    Diabetes Mother        Social history:  Social History     Tobacco Use    Smoking status: Former     Current packs/day: 0.00     Average packs/day: 1 pack/day for 32.0 years (32.0 ttl pk-yrs)     Types: Cigarettes     Start date: 1984     Quit date: 2016     Years since quittin.4     Passive exposure: Never    Smokeless tobacco: Never   Substance Use Topics    Alcohol use: Yes    Marital status: .  Nursing Notes:   Deepa Olson  2025  8:25 AM  Signed  Chief Complaint   Patient presents with    Follow Up    Hemorrhoids     Banding       Vitals:    25 0822   BP: 116/66   BP Location: Left arm   Patient Position: Sitting   Cuff Size: Adult Regular   Pulse: 77   SpO2: 100%   Weight: 128 lb 12.8 oz   Height: 5' 4\"       Body mass index is 22.11 kg/m .    DM Alexandra     10 minutes spent on the date of encounter performing chart review, history and exam, documentation and further activities as noted above with an additional 5 minutes for anoscopy and banding.     TONI Posada, NP-C  Colon and Rectal Surgery   [facility]    This note was created using speech recognition software and may contain unintended word substitutions.    "

## 2025-06-23 NOTE — NURSING NOTE
"Chief Complaint   Patient presents with    Follow Up    Hemorrhoids     Banding       Vitals:    06/23/25 0822   BP: 116/66   BP Location: Left arm   Patient Position: Sitting   Cuff Size: Adult Regular   Pulse: 77   SpO2: 100%   Weight: 128 lb 12.8 oz   Height: 5' 4\"       Body mass index is 22.11 kg/m .    Deepa Olson, EMT  "

## 2025-06-29 ENCOUNTER — RESULTS FOLLOW-UP (OUTPATIENT)
Dept: FAMILY MEDICINE | Facility: CLINIC | Age: 55
End: 2025-06-29
Payer: COMMERCIAL

## 2025-07-21 ENCOUNTER — TELEPHONE (OUTPATIENT)
Dept: FAMILY MEDICINE | Facility: CLINIC | Age: 55
End: 2025-07-21
Payer: COMMERCIAL

## 2025-07-21 NOTE — TELEPHONE ENCOUNTER
Patient Quality Outreach    Patient is due for the following:   Physical Preventive Adult Physical    Action(s) Taken:   Schedule a Adult Preventative    Type of outreach:    Sent letter.    Questions for provider review:    None         Lola Araya  Chart routed to None.

## 2025-07-21 NOTE — LETTER
[address] [address]  [phone]  Dept: [phone]    July 21, 2025    Martin Salvador  [address]  [facility] [address]    Dear Martin,    At [facility] we care about your health and are committed to providing quality patient care.     Here is a list of Health Maintenance topics that are due now or due soon:  Health Maintenance Due   Topic Date Due    COPD ACTION PLAN  Never done    YEARLY PREVENTIVE VISIT  12/23/2021    LUNG CANCER SCREENING  07/14/2023    COVID-19 VACCINE (4 - 2024-25 season) 09/01/2024    DIABETIC FOOT EXAM  09/14/2024    ANNUAL REVIEW OF HM ORDERS  09/18/2025        We are recommending that you:  Schedule a WELLNESS (Preventative/Physical) APPOINTMENT with your primary care provider. If you go elsewhere for your wellness appointments then please disregard this reminder    To schedule an appointment or discuss this further, you may contact us by phone at the [facility] at [phone] or online through the [email or web address] @ [email or web address]    Thank you for trusting [facility] and we appreciate the opportunity to serve you.  We look forward to supporting your healthcare needs in the future.    Your partners in health,      Quality Committee at [facility]         Electronically signed

## 2025-08-27 DIAGNOSIS — R35.1 BENIGN PROSTATIC HYPERPLASIA WITH NOCTURIA: ICD-10-CM

## 2025-08-27 DIAGNOSIS — N40.1 BENIGN PROSTATIC HYPERPLASIA WITH NOCTURIA: ICD-10-CM

## 2025-08-27 RX ORDER — TAMSULOSIN HYDROCHLORIDE 0.4 MG/1
0.4 CAPSULE ORAL DAILY
Qty: 90 CAPSULE | Refills: 1 | Status: SHIPPED | OUTPATIENT
Start: 2025-08-27

## 2025-08-28 DIAGNOSIS — K21.9 GASTROESOPHAGEAL REFLUX DISEASE WITHOUT ESOPHAGITIS: ICD-10-CM

## 2025-08-28 RX ORDER — PANTOPRAZOLE SODIUM 40 MG/1
40 TABLET, DELAYED RELEASE ORAL DAILY
Qty: 90 TABLET | Refills: 1 | OUTPATIENT
Start: 2025-08-28

## (undated) DEVICE — ESU PENCIL SMOKE EVAC W/ROCKER SWITCH 0703-047-000

## (undated) DEVICE — PAD CHUX UNDERPAD 23X24" 7136

## (undated) DEVICE — ESU ELEC NDL 1" COATED/INSULATED E1465

## (undated) DEVICE — GLOVE BIOGEL PI MICRO SZ 7.5 48575

## (undated) DEVICE — ESU EYE HIGH TEMP 65410-183

## (undated) DEVICE — NDL 30GA 0.5" 305106

## (undated) DEVICE — SYR 03ML LL W/O NDL

## (undated) DEVICE — SU PLAIN 6-0 G-1DA 18" 770G

## (undated) DEVICE — SOL WATER IRRIG 1000ML BOTTLE 07139-09

## (undated) DEVICE — SU VICRYL 7-0 P-1 18" J488G

## (undated) DEVICE — PACK MINOR EYE

## (undated) DEVICE — KIT ENDO FIRST STEP DISINFECTANT 200ML W/POUCH EP-4

## (undated) DEVICE — PREP CHLORAPREP 26ML TINTED ORANGE  260815

## (undated) RX ORDER — FENTANYL CITRATE 50 UG/ML
INJECTION, SOLUTION INTRAMUSCULAR; INTRAVENOUS
Status: DISPENSED
Start: 2024-06-10

## (undated) RX ORDER — FENTANYL CITRATE 50 UG/ML
INJECTION, SOLUTION INTRAMUSCULAR; INTRAVENOUS
Status: DISPENSED
Start: 2024-05-13

## (undated) RX ORDER — FENTANYL CITRATE 50 UG/ML
INJECTION, SOLUTION INTRAMUSCULAR; INTRAVENOUS
Status: DISPENSED
Start: 2024-04-11

## (undated) RX ORDER — ACETAMINOPHEN 325 MG/1
TABLET ORAL
Status: DISPENSED
Start: 2024-05-13

## (undated) RX ORDER — ACETAMINOPHEN 325 MG/1
TABLET ORAL
Status: DISPENSED
Start: 2024-06-10